# Patient Record
Sex: FEMALE | Race: WHITE | NOT HISPANIC OR LATINO | Employment: OTHER | URBAN - METROPOLITAN AREA
[De-identification: names, ages, dates, MRNs, and addresses within clinical notes are randomized per-mention and may not be internally consistent; named-entity substitution may affect disease eponyms.]

---

## 2017-09-05 ENCOUNTER — TELEPHONE (OUTPATIENT)
Dept: NEUROLOGY | Facility: CLINIC | Age: 82
End: 2017-09-05

## 2017-09-05 NOTE — TELEPHONE ENCOUNTER
----- Message from Nely Urbano sent at 8/31/2017  1:56 PM CDT -----  Contact: self  Patient would like to make an appointment for pains in her head   She is being referred by Dr Nick Aquino    Please call  to schedule     Thanks

## 2017-09-05 NOTE — TELEPHONE ENCOUNTER
Returned call and spoke with patient. Informed patient that we would need a referral in order to schedule. Instructed patient to call her PCP office to fax us the referral. Fax number given. Informed patient that once we received the referral someone from our office would call to schedule her appointment. Patient verbalized understanding.

## 2017-09-21 ENCOUNTER — TELEPHONE (OUTPATIENT)
Dept: NEUROLOGY | Facility: CLINIC | Age: 82
End: 2017-09-21

## 2017-09-21 NOTE — TELEPHONE ENCOUNTER
Called and spoke with patient. Informed patient that referral was received and I was calling to schedule with Dr. Regalado for Headaches. Patient stated she was unaware of needing to be seen for Headaches. Patient Denied appointment. Instructed patient to call back if she changes her mind. Patient verbalized understanding.

## 2017-11-09 ENCOUNTER — HOSPITAL ENCOUNTER (OUTPATIENT)
Dept: RADIOLOGY | Facility: HOSPITAL | Age: 82
Discharge: HOME | End: 2017-11-09
Attending: ORTHOPAEDIC SURGERY
Payer: MEDICARE

## 2017-11-09 ENCOUNTER — HOSPITAL ENCOUNTER (OUTPATIENT)
Dept: PREADMISSION TESTING | Facility: HOSPITAL | Age: 82
Discharge: HOME | End: 2017-11-09
Attending: ORTHOPAEDIC SURGERY
Payer: MEDICARE

## 2017-11-09 VITALS — HEIGHT: 62 IN | BODY MASS INDEX: 22.08 KG/M2 | WEIGHT: 120 LBS

## 2017-11-09 DIAGNOSIS — Z96.652 KNEE JOINT REPLACEMENT STATUS, LEFT: ICD-10-CM

## 2017-11-09 DIAGNOSIS — M17.12 OSTEOARTHRITIS OF LEFT KNEE, UNSPECIFIED OSTEOARTHRITIS TYPE: Primary | ICD-10-CM

## 2017-11-09 LAB
ANION GAP SERPL CALC-SCNC: 10 MMOL/L
BACTERIA #/AREA URNS HPF: NORMAL /HPF
BASOPHILS # BLD AUTO: 0 K/UL
BASOPHILS NFR BLD: 0.4 %
BILIRUB UR QL STRIP: NEGATIVE
BUN SERPL-MCNC: 15 MG/DL
CALCIUM SERPL-MCNC: 10.1 MG/DL
CHLORIDE SERPL-SCNC: 104 MMOL/L
CLARITY UR: CLEAR
CO2 SERPL-SCNC: 28 MMOL/L
COLOR UR: YELLOW
CREAT SERPL-MCNC: 0.8 MG/DL
DIFFERENTIAL METHOD: ABNORMAL
EOSINOPHIL # BLD AUTO: 0.8 K/UL
EOSINOPHIL NFR BLD: 11.1 %
ERYTHROCYTE [DISTWIDTH] IN BLOOD BY AUTOMATED COUNT: 13.1 %
EST. GFR  (AFRICAN AMERICAN): >60 ML/MIN/1.73 M^2
EST. GFR  (NON AFRICAN AMERICAN): >60 ML/MIN/1.73 M^2
GLUCOSE SERPL-MCNC: 96 MG/DL
GLUCOSE UR QL STRIP: NEGATIVE
HCT VFR BLD AUTO: 40.4 %
HGB BLD-MCNC: 13.9 G/DL
HGB UR QL STRIP: ABNORMAL
KETONES UR QL STRIP: NEGATIVE
LEUKOCYTE ESTERASE UR QL STRIP: ABNORMAL
LYMPHOCYTES # BLD AUTO: 2.1 K/UL
LYMPHOCYTES NFR BLD: 27.9 %
MCH RBC QN AUTO: 32.6 PG
MCHC RBC AUTO-ENTMCNC: 34.5 G/DL
MCV RBC AUTO: 94 FL
MICROSCOPIC COMMENT: NORMAL
MONOCYTES # BLD AUTO: 0.5 K/UL
MONOCYTES NFR BLD: 7.3 %
NEUTROPHILS # BLD AUTO: 4 K/UL
NEUTROPHILS NFR BLD: 53.3 %
NITRITE UR QL STRIP: NEGATIVE
PH UR STRIP: 6 [PH] (ref 5–8)
PLATELET # BLD AUTO: 321 K/UL
PMV BLD AUTO: 8 FL
POTASSIUM SERPL-SCNC: 4.4 MMOL/L
PROT UR QL STRIP: NEGATIVE
RBC # BLD AUTO: 4.28 M/UL
RBC #/AREA URNS HPF: 3 /HPF (ref 0–4)
SODIUM SERPL-SCNC: 142 MMOL/L
SP GR UR STRIP: 1.02 (ref 1–1.03)
SQUAMOUS #/AREA URNS HPF: 2 /HPF
URN SPEC COLLECT METH UR: ABNORMAL
UROBILINOGEN UR STRIP-ACNC: NEGATIVE EU/DL
WBC # BLD AUTO: 7.5 K/UL
WBC #/AREA URNS HPF: 4 /HPF (ref 0–5)

## 2017-11-09 PROCEDURE — 85025 COMPLETE CBC W/AUTO DIFF WBC: CPT

## 2017-11-09 PROCEDURE — 80048 BASIC METABOLIC PNL TOTAL CA: CPT

## 2017-11-09 PROCEDURE — 71020 XR CHEST PA AND LATERAL: CPT | Mod: 26,,, | Performed by: RADIOLOGY

## 2017-11-09 PROCEDURE — 99900104 DSU ONLY-NO CHARGE-EA ADD'L HR (STAT)

## 2017-11-09 PROCEDURE — 71020 XR CHEST PA AND LATERAL: CPT | Mod: TC

## 2017-11-09 PROCEDURE — 87081 CULTURE SCREEN ONLY: CPT

## 2017-11-09 PROCEDURE — 36415 COLL VENOUS BLD VENIPUNCTURE: CPT

## 2017-11-09 PROCEDURE — 99900103 DSU ONLY-NO CHARGE-INITIAL HR (STAT)

## 2017-11-09 PROCEDURE — 93010 ELECTROCARDIOGRAM REPORT: CPT | Mod: ,,, | Performed by: INTERNAL MEDICINE

## 2017-11-09 PROCEDURE — 93005 ELECTROCARDIOGRAM TRACING: CPT

## 2017-11-09 PROCEDURE — 81000 URINALYSIS NONAUTO W/SCOPE: CPT

## 2017-11-09 RX ORDER — LOSARTAN POTASSIUM 100 MG/1
100 TABLET ORAL DAILY
COMMUNITY
End: 2021-12-26 | Stop reason: SDUPTHER

## 2017-11-10 NOTE — PRE ADMISSION SCREENING
"               [facility] Risk Assessment Scale    Patient Name: Shalini Donald  YOB: 1934  MRN: [id number]            RIsk Factor Measure Recommendation Patient Data Scale/Score   BMI >40 Reconsider surgery, weight loss   Estimated body mass index is 22.31 kg/m² as calculated from the following:    Height as of this encounter: 5' 1.5" (1.562 m).    Weight as of this encounter: 54.4 kg (120 lb).   [x] 0 = 1 - 24.9  [] 1 = 25-29.9  [] 2 = 30-34.9  [] 3 = 35-39.9  [] 4 = 40-44.9  [] 5 = 45-99.9   Hemoglobin AIC (if applicable) >9 Delay surgery until DM under control  Refer for:  · Nutrition Therapy  · Exercise   · Medication    No results found for: LABA1C, HGBA1C    Lab Results   Component Value Date    GLU 96 11/09/2017      [] 0 = 4.0-5.6  [] 1 = 5.7-6.4  [] 2 = 6.5-6.9  [] 3 = 7.0-7.9  [] 4 = 8.0-8.9  [] 5 = 9.0-12   Hemoglobin (Anemia) <9 Delay surgery   Correct anemia Lab Results   Component Value Date    HGB 13.9 11/09/2017    [] 20 - <9.0                    Albumin <3 Delay surgery &Workup No results found for: ALBUMIN [] 20 - <3.0   Smoking Cessation >4 Weeks Delay Surgery  Refer to OP Cessation Class     [] 20 - current smoker                                _____ PPD                    Hx of MI, PE, Arrhythmia, CVA, DVT <30 Days Delay Surgery     [] 20      Infection Variable Delay surgery and re-evaluate    [] 20 - recent/current infection     Depression (PHQ) >10 out of 27 Delay Surgery and re-evaluate  Medication  Counseling              [x] 0     []1     []2     []3      []4      [] 5                    (1-4)      (5-9)  (10-14)  (15-19)   (20-27)     Memory Impairment & Memory loss (Mini-Cog Screening Tool) Advanced dementia and/or Parkinson's Reconsider surgery     [x] 0     []1     []2     []3     []4     [] 5     Physical Conditioning (Modified AM-PAC Per Physical Therapy at [facility]) Unable to ambulate on day of surgery Delay surgery and re-evaluate  Pre-Rehabilitation   (PT evaluation)      " 22 []  0   [x]4       []8     []12        []16     []20       (<20%)   (<40%)   (<60%)   (<80% )    (>80%)     Home Environment/Caregiver support  (Per /Navigator Interview)    Availability of basic services and/or approprate assistance during post-operative period Delay surgery and re-evaluate  Safe home environment  Health   1 week post-surgery  Transportation  availability  Ability to obtain DME/Medications post-op    [x] 0     []1     []2     []3     []4     [] 5  [x] 0     []1     []2     []3     []4     [] 5  [x] 0     []1     []2     []3     []4     [] 5  [x] 0     []1     []2     []3     []4     [] 5         MD Contact: Dr. Aquino Comments:  Total Score:  4

## 2017-11-10 NOTE — PRE ADMISSION SCREENING
JOINT CAMP ASSESSMENT    Name Shalini Donald   MRN [id number]    Age/Sex 83 y.o. female    Surgeon No ref. provider found   Joint Camp Date 11/10/2017   Surgery Date 11/21/2017   Procedure Left knee Arthroplasty   Insurance Payor: MEDICARE / Plan: MEDICARE PART A & B / Product Type: Government /    Care Team Patient Care Team:  Kali Mccullough MD as PCP - General (Internal Medicine)    Pharmacy   [id number] - [place] - [address]  [address] [address]  Phone: [phone] Fax: [phone]     AM-PAC Score   22   Risk Assessment Score 4     Past Medical History:   Diagnosis Date    Arthritis     Back pain     Hypertension     Wears glasses     Wears partial dentures     upper       Past Surgical History:   Procedure Laterality Date    CHOLECYSTECTOMY      JOINT REPLACEMENT Right     knee         Home Enviroment     Living Arrangement: Lives alone  Home Environment: 1-story house/ trailer, number of outside stairs: 3  Home Safety Concerns: unremarkable    DISCHARGE CAREGIVER/SUPPORT SYSTEM     Identified post-op caregiver: Patient has children / family / friends to help, patient and son.  Patient's caregiver(s) will be able to provide physical assistance. Patient will have someone to assist overnight.      Caregiver present at pre-op interview:  No      PRE-OPERATIVE FUNCTIONAL STATUS     Employment: Retired    Pre-op Functional Status: Patient is independent with mobility/ambulation, transfers, ADL's, IADL's.    Use of assistive device for ambulation: none  ADL: self care  ADL Limitations: difficulty with walking  Medical Restrictions: Decreased range of motions in extremities    POTENTIAL BARRIERS TO DISCHARGE/POTENTIAL POST-OP COMPLICATIONS     Decreased caregiver support. Patients son Moses Donald will be staying with patient for 2 weeks following surgery. Patient expressed concerns over her sons willingness to provide care unless prompted by her.       Depression / Anxiety:  Patient was anxious about care pathway and ability to be able to walk post operatively. She continued to refer back to her last surgery and stated that she did not get up on the day after surgery and that it was hard for her to walk when she got home. Patient was    concerned about being able to get into her home with the 3 stairs out front. She stated that after her last surgery she had an ambulance take her home and someone carried her into the home and put her in her bed.       Needed Interventions (if applicable): Education with son to provide expectations on his role as a  and what will be needed in the home upon discharge. I have attempted to contact the patients son to educate him on 3 occassions since Joint Camp with no answer.        Expected LOS of 2 days or less for joint replacement discussed with patient. We also discussed a discharge path of HH for approximately two weeks with a transition to outpatient PT on the third week given no post-op complications.       Patient in agreement with discharge plan: Yes    DISCHARGE PLAN     Discharge to: Home with 24 hour assistance     HH:  [facility]     OP PT: [facility]     Home DME: bedside commode and shower chair      Needed DME at D/C: rolling walker     Rx: Per Dr. Aquino at discharge     Meds to Beds: N/A     Patient expected to discharge on Aspirin 325 mg twice daily for DVT prophylaxis.

## 2017-11-10 NOTE — PRE ADMISSION SCREENING
Patient Name: Shalini Donald  YOB: 1934   MRN: [id number]     [facility]   Basic Mobility Inpatient Short Form 6 Clicks         How much difficulty does the patient currently have  Unable  A Lot  A Little  None      1. Turning over in bed (including adjusting bedclothes, sheets and blankets)?     1 []    2 []    3 []    4 [x]        2. Sitting down on and standing up from a chair with arms (e.g., wheelchair, bedside commode, etc.)     1 []  2 []  3 [x]     4 []      3. Moving from lying on back to sitting on the side of the bed?     1 []  2 []  3 [x]    4 []    How much help from another person does the patient currently need  Total  A Lot  A Little  None      4. Moving to and from a bed to a chair (including a wheelchair)?    1 []  2 []  3 []    4 [x]      5. Need to walk in hospital room?    1 []  2 []  3 []    4 [x]      6. Climbing 3-5 steps with a railing?    1 []  2 []  3 []    4 [x]       Raw Score:      22            CMS 0-100% Score:   20.91         %   Standardized Score:   53.28            CMS Modifier:      CJ                                    [facility]   Basic Mobility Inpatient Short Form 6 Clicks Score Conversion Table*         *Use this form to convert -PAC Basic Mobility Inpatient Raw Scores.   [facility] Inpatient Basic Mobility Short Form Scoring Example   1. Add the number values associated with the response to each item. For example, items totals yield a Raw Score of 21.   2. Match the raw score to the t-Scale scores (t-Scale score = 50.25, SE = 4.69).   3. Find the associated CMS % (CMS % = 28.97%).   4. Locate the correct CMS Functional Modifier Code, or G Code (G code = CJ)     NOTE: Each -PAC Short Form has a separate conversion table. Make sure that you use the correct conversion table.       Instruction Manual - page 45 contains conversion table

## 2017-11-11 LAB — MRSA SPEC QL CULT: NORMAL

## 2017-11-20 ENCOUNTER — ANESTHESIA EVENT (OUTPATIENT)
Dept: SURGERY | Facility: HOSPITAL | Age: 82
DRG: 470 | End: 2017-11-20
Payer: MEDICARE

## 2017-11-20 NOTE — OR NURSING
Spoke with Darby at Dr. Aquino's office concerning UA result.  States the patient has been treated with bactrin and just need to get a repeat UA on the day of surgery or today when the patient comes in for the T&S.  Spoke with outpatient phlebotomistSilvana about getting the UA done along with the T&S today when the patient arrives.

## 2017-11-21 ENCOUNTER — ANESTHESIA (OUTPATIENT)
Dept: SURGERY | Facility: HOSPITAL | Age: 82
DRG: 470 | End: 2017-11-21
Payer: MEDICARE

## 2017-11-21 ENCOUNTER — HOSPITAL ENCOUNTER (INPATIENT)
Facility: HOSPITAL | Age: 82
LOS: 3 days | Discharge: HOME HEALTH | DRG: 470 | End: 2017-11-24
Attending: ORTHOPAEDIC SURGERY | Admitting: INTERNAL MEDICINE
Payer: MEDICARE

## 2017-11-21 DIAGNOSIS — M19.90 ARTHRITIS: ICD-10-CM

## 2017-11-21 DIAGNOSIS — I10 HYPERTENSION, UNSPECIFIED TYPE: ICD-10-CM

## 2017-11-21 DIAGNOSIS — Z96.652 S/P TOTAL KNEE ARTHROPLASTY, LEFT: ICD-10-CM

## 2017-11-21 DIAGNOSIS — Z96.652 KNEE JOINT REPLACEMENT STATUS, LEFT: Primary | ICD-10-CM

## 2017-11-21 LAB
BILIRUB UR QL STRIP: NEGATIVE
CLARITY UR: CLEAR
COLOR UR: YELLOW
GLUCOSE UR QL STRIP: NEGATIVE
HGB UR QL STRIP: ABNORMAL
KETONES UR QL STRIP: NEGATIVE
LEUKOCYTE ESTERASE UR QL STRIP: NEGATIVE
NITRITE UR QL STRIP: NEGATIVE
PH UR STRIP: 7 [PH] (ref 5–8)
PROT UR QL STRIP: NEGATIVE
SP GR UR STRIP: 1.01 (ref 1–1.03)
URN SPEC COLLECT METH UR: ABNORMAL
UROBILINOGEN UR STRIP-ACNC: NEGATIVE EU/DL

## 2017-11-21 PROCEDURE — 36000711: Performed by: ORTHOPAEDIC SURGERY

## 2017-11-21 PROCEDURE — 27201423 OPTIME MED/SURG SUP & DEVICES STERILE SUPPLY: Performed by: ORTHOPAEDIC SURGERY

## 2017-11-21 PROCEDURE — C2626 INFUSION PUMP, NON-PROG,TEMP: HCPCS | Performed by: ANESTHESIOLOGY

## 2017-11-21 PROCEDURE — 25000003 PHARM REV CODE 250: Performed by: ANESTHESIOLOGY

## 2017-11-21 PROCEDURE — 99900104 DSU ONLY-NO CHARGE-EA ADD'L HR (STAT): Performed by: ORTHOPAEDIC SURGERY

## 2017-11-21 PROCEDURE — 99900103 DSU ONLY-NO CHARGE-INITIAL HR (STAT): Performed by: ORTHOPAEDIC SURGERY

## 2017-11-21 PROCEDURE — C1713 ANCHOR/SCREW BN/BN,TIS/BN: HCPCS | Performed by: ORTHOPAEDIC SURGERY

## 2017-11-21 PROCEDURE — 76942 ECHO GUIDE FOR BIOPSY: CPT | Mod: 26,,, | Performed by: ANESTHESIOLOGY

## 2017-11-21 PROCEDURE — 27000221 HC OXYGEN, UP TO 24 HOURS

## 2017-11-21 PROCEDURE — 63600175 PHARM REV CODE 636 W HCPCS: Performed by: ANESTHESIOLOGY

## 2017-11-21 PROCEDURE — 71000033 HC RECOVERY, INTIAL HOUR: Performed by: ORTHOPAEDIC SURGERY

## 2017-11-21 PROCEDURE — 25000003 PHARM REV CODE 250: Performed by: ORTHOPAEDIC SURGERY

## 2017-11-21 PROCEDURE — 99222 1ST HOSP IP/OBS MODERATE 55: CPT | Mod: AI,,, | Performed by: INTERNAL MEDICINE

## 2017-11-21 PROCEDURE — 25000003 PHARM REV CODE 250: Performed by: NURSE ANESTHETIST, CERTIFIED REGISTERED

## 2017-11-21 PROCEDURE — 36000710: Performed by: ORTHOPAEDIC SURGERY

## 2017-11-21 PROCEDURE — 27201202 HC KIT FOLEY IC PLUS CTR ENTRY (INDWELLING): Performed by: ORTHOPAEDIC SURGERY

## 2017-11-21 PROCEDURE — 37000008 HC ANESTHESIA 1ST 15 MINUTES: Performed by: ORTHOPAEDIC SURGERY

## 2017-11-21 PROCEDURE — 63600175 PHARM REV CODE 636 W HCPCS: Performed by: ORTHOPAEDIC SURGERY

## 2017-11-21 PROCEDURE — 94760 N-INVAS EAR/PLS OXIMETRY 1: CPT

## 2017-11-21 PROCEDURE — 63600175 PHARM REV CODE 636 W HCPCS

## 2017-11-21 PROCEDURE — D9220A PRA ANESTHESIA: Mod: ANES,,, | Performed by: ANESTHESIOLOGY

## 2017-11-21 PROCEDURE — C1776 JOINT DEVICE (IMPLANTABLE): HCPCS | Performed by: ORTHOPAEDIC SURGERY

## 2017-11-21 PROCEDURE — 63600175 PHARM REV CODE 636 W HCPCS: Performed by: NURSE ANESTHETIST, CERTIFIED REGISTERED

## 2017-11-21 PROCEDURE — D9220A PRA ANESTHESIA: Mod: CRNA,,, | Performed by: NURSE ANESTHETIST, CERTIFIED REGISTERED

## 2017-11-21 PROCEDURE — 37000009 HC ANESTHESIA EA ADD 15 MINS: Performed by: ORTHOPAEDIC SURGERY

## 2017-11-21 PROCEDURE — 25000003 PHARM REV CODE 250: Performed by: INTERNAL MEDICINE

## 2017-11-21 PROCEDURE — 81003 URINALYSIS AUTO W/O SCOPE: CPT

## 2017-11-21 PROCEDURE — 71000039 HC RECOVERY, EACH ADD'L HOUR: Performed by: ORTHOPAEDIC SURGERY

## 2017-11-21 PROCEDURE — 11000001 HC ACUTE MED/SURG PRIVATE ROOM

## 2017-11-21 PROCEDURE — S5010 5% DEXTROSE AND 0.45% SALINE: HCPCS | Performed by: INTERNAL MEDICINE

## 2017-11-21 PROCEDURE — C1729 CATH, DRAINAGE: HCPCS | Performed by: ORTHOPAEDIC SURGERY

## 2017-11-21 PROCEDURE — 27200664 HC NERVE BLOCK COMPLETE KIT: Performed by: ANESTHESIOLOGY

## 2017-11-21 PROCEDURE — 94799 UNLISTED PULMONARY SVC/PX: CPT

## 2017-11-21 PROCEDURE — 64448 NJX AA&/STRD FEM NRV NFS IMG: CPT | Mod: 59,LT,, | Performed by: ANESTHESIOLOGY

## 2017-11-21 PROCEDURE — 27200651 HC AIRWAY, LMA: Performed by: NURSE ANESTHETIST, CERTIFIED REGISTERED

## 2017-11-21 PROCEDURE — 0SRD0J9 REPLACEMENT OF LEFT KNEE JOINT WITH SYNTHETIC SUBSTITUTE, CEMENTED, OPEN APPROACH: ICD-10-PCS | Performed by: ORTHOPAEDIC SURGERY

## 2017-11-21 PROCEDURE — 76942 ECHO GUIDE FOR BIOPSY: CPT | Performed by: ANESTHESIOLOGY

## 2017-11-21 DEVICE — BASEPLATE TIB CEM PRIM SZ 3: Type: IMPLANTABLE DEVICE | Site: KNEE | Status: FUNCTIONAL

## 2017-11-21 DEVICE — INSERT TRIATHLON X3 SZ3 9MM: Type: IMPLANTABLE DEVICE | Site: KNEE | Status: FUNCTIONAL

## 2017-11-21 DEVICE — IMPLANTABLE DEVICE: Type: IMPLANTABLE DEVICE | Site: KNEE | Status: FUNCTIONAL

## 2017-11-21 DEVICE — CEMENT BONE ANTIBIO SIMPLEX P: Type: IMPLANTABLE DEVICE | Site: KNEE | Status: FUNCTIONAL

## 2017-11-21 DEVICE — PIN PINABALL HEADLESS: Type: IMPLANTABLE DEVICE | Site: KNEE | Status: FUNCTIONAL

## 2017-11-21 DEVICE — PATELLA TRI 35X10 X3 POLYETHYL: Type: IMPLANTABLE DEVICE | Site: KNEE | Status: FUNCTIONAL

## 2017-11-21 RX ORDER — ASPIRIN 325 MG
325 TABLET ORAL DAILY
Status: ON HOLD | COMMUNITY
End: 2017-11-24 | Stop reason: HOSPADM

## 2017-11-21 RX ORDER — DIPHENHYDRAMINE HYDROCHLORIDE 50 MG/ML
25 INJECTION INTRAMUSCULAR; INTRAVENOUS EVERY 6 HOURS PRN
Status: DISCONTINUED | OUTPATIENT
Start: 2017-11-21 | End: 2017-11-21 | Stop reason: HOSPADM

## 2017-11-21 RX ORDER — FENTANYL CITRATE 50 UG/ML
25 INJECTION, SOLUTION INTRAMUSCULAR; INTRAVENOUS EVERY 5 MIN PRN
Status: COMPLETED | OUTPATIENT
Start: 2017-11-21 | End: 2017-11-21

## 2017-11-21 RX ORDER — VANCOMYCIN HYDROCHLORIDE 500 MG/100ML
500 INJECTION, SOLUTION INTRAVENOUS
Status: DISCONTINUED | OUTPATIENT
Start: 2017-11-21 | End: 2017-11-21

## 2017-11-21 RX ORDER — ONDANSETRON 2 MG/ML
4 INJECTION INTRAMUSCULAR; INTRAVENOUS DAILY PRN
Status: DISCONTINUED | OUTPATIENT
Start: 2017-11-21 | End: 2017-11-21 | Stop reason: HOSPADM

## 2017-11-21 RX ORDER — LIDOCAINE HYDROCHLORIDE 10 MG/ML
1 INJECTION, SOLUTION EPIDURAL; INFILTRATION; INTRACAUDAL; PERINEURAL
Status: DISCONTINUED | OUTPATIENT
Start: 2017-11-21 | End: 2017-11-21 | Stop reason: HOSPADM

## 2017-11-21 RX ORDER — FENTANYL CITRATE 50 UG/ML
INJECTION, SOLUTION INTRAMUSCULAR; INTRAVENOUS
Status: DISCONTINUED | OUTPATIENT
Start: 2017-11-21 | End: 2017-11-21

## 2017-11-21 RX ORDER — SODIUM CHLORIDE 0.9 % (FLUSH) 0.9 %
3 SYRINGE (ML) INJECTION
Status: DISCONTINUED | OUTPATIENT
Start: 2017-11-21 | End: 2017-11-21 | Stop reason: HOSPADM

## 2017-11-21 RX ORDER — SODIUM CHLORIDE 0.9 % (FLUSH) 0.9 %
3 SYRINGE (ML) INJECTION EVERY 8 HOURS
Status: DISCONTINUED | OUTPATIENT
Start: 2017-11-21 | End: 2017-11-21 | Stop reason: HOSPADM

## 2017-11-21 RX ORDER — OXYCODONE HYDROCHLORIDE 5 MG/1
5 TABLET ORAL
Status: DISCONTINUED | OUTPATIENT
Start: 2017-11-21 | End: 2017-11-21 | Stop reason: HOSPADM

## 2017-11-21 RX ORDER — ONDANSETRON HYDROCHLORIDE 2 MG/ML
INJECTION, SOLUTION INTRAMUSCULAR; INTRAVENOUS
Status: DISCONTINUED | OUTPATIENT
Start: 2017-11-21 | End: 2017-11-21

## 2017-11-21 RX ORDER — BISACODYL 10 MG
10 SUPPOSITORY, RECTAL RECTAL DAILY PRN
Status: DISCONTINUED | OUTPATIENT
Start: 2017-11-21 | End: 2017-11-24 | Stop reason: HOSPADM

## 2017-11-21 RX ORDER — MEPERIDINE HYDROCHLORIDE 25 MG/ML
12.5 INJECTION INTRAMUSCULAR; INTRAVENOUS; SUBCUTANEOUS ONCE AS NEEDED
Status: DISCONTINUED | OUTPATIENT
Start: 2017-11-21 | End: 2017-11-21 | Stop reason: HOSPADM

## 2017-11-21 RX ORDER — PROMETHAZINE HYDROCHLORIDE 12.5 MG/1
12.5 TABLET ORAL EVERY 6 HOURS PRN
Status: DISCONTINUED | OUTPATIENT
Start: 2017-11-21 | End: 2017-11-21 | Stop reason: HOSPADM

## 2017-11-21 RX ORDER — MUPIROCIN 20 MG/G
1 OINTMENT TOPICAL 2 TIMES DAILY
Status: DISCONTINUED | OUTPATIENT
Start: 2017-11-21 | End: 2017-11-24 | Stop reason: HOSPADM

## 2017-11-21 RX ORDER — DEXTROSE MONOHYDRATE AND SODIUM CHLORIDE 5; .45 G/100ML; G/100ML
INJECTION, SOLUTION INTRAVENOUS CONTINUOUS
Status: DISCONTINUED | OUTPATIENT
Start: 2017-11-21 | End: 2017-11-24 | Stop reason: HOSPADM

## 2017-11-21 RX ORDER — PROPOFOL 10 MG/ML
VIAL (ML) INTRAVENOUS
Status: DISCONTINUED | OUTPATIENT
Start: 2017-11-21 | End: 2017-11-21

## 2017-11-21 RX ORDER — HYDROMORPHONE HYDROCHLORIDE 2 MG/ML
0.2 INJECTION, SOLUTION INTRAMUSCULAR; INTRAVENOUS; SUBCUTANEOUS EVERY 5 MIN PRN
Status: DISCONTINUED | OUTPATIENT
Start: 2017-11-21 | End: 2017-11-21 | Stop reason: HOSPADM

## 2017-11-21 RX ORDER — FAMOTIDINE 20 MG/1
20 TABLET, FILM COATED ORAL 2 TIMES DAILY
Status: DISCONTINUED | OUTPATIENT
Start: 2017-11-21 | End: 2017-11-22

## 2017-11-21 RX ORDER — ACETAMINOPHEN 10 MG/ML
INJECTION, SOLUTION INTRAVENOUS
Status: DISCONTINUED | OUTPATIENT
Start: 2017-11-21 | End: 2017-11-21

## 2017-11-21 RX ORDER — ONDANSETRON 2 MG/ML
4 INJECTION INTRAMUSCULAR; INTRAVENOUS EVERY 12 HOURS PRN
Status: DISCONTINUED | OUTPATIENT
Start: 2017-11-21 | End: 2017-11-24 | Stop reason: HOSPADM

## 2017-11-21 RX ORDER — LIDOCAINE HCL/PF 100 MG/5ML
SYRINGE (ML) INTRAVENOUS
Status: DISCONTINUED | OUTPATIENT
Start: 2017-11-21 | End: 2017-11-21

## 2017-11-21 RX ORDER — HYDROCODONE BITARTRATE AND ACETAMINOPHEN 5; 325 MG/1; MG/1
1 TABLET ORAL EVERY 4 HOURS PRN
Status: DISCONTINUED | OUTPATIENT
Start: 2017-11-21 | End: 2017-11-24 | Stop reason: HOSPADM

## 2017-11-21 RX ORDER — ONDANSETRON 4 MG/1
4 TABLET, ORALLY DISINTEGRATING ORAL ONCE
Status: DISCONTINUED | OUTPATIENT
Start: 2017-11-21 | End: 2017-11-24 | Stop reason: HOSPADM

## 2017-11-21 RX ORDER — SCOLOPAMINE TRANSDERMAL SYSTEM 1 MG/1
1 PATCH, EXTENDED RELEASE TRANSDERMAL ONCE
Status: COMPLETED | OUTPATIENT
Start: 2017-11-21 | End: 2017-11-21

## 2017-11-21 RX ORDER — MIDAZOLAM HYDROCHLORIDE 1 MG/ML
INJECTION, SOLUTION INTRAMUSCULAR; INTRAVENOUS
Status: DISCONTINUED | OUTPATIENT
Start: 2017-11-21 | End: 2017-11-21

## 2017-11-21 RX ORDER — TRANEXAMIC ACID 100 MG/ML
INJECTION, SOLUTION INTRAVENOUS
Status: DISCONTINUED | OUTPATIENT
Start: 2017-11-21 | End: 2017-11-21

## 2017-11-21 RX ORDER — ZOLPIDEM TARTRATE 5 MG/1
5 TABLET ORAL NIGHTLY PRN
Status: DISCONTINUED | OUTPATIENT
Start: 2017-11-21 | End: 2017-11-24 | Stop reason: HOSPADM

## 2017-11-21 RX ORDER — ASPIRIN 325 MG
325 TABLET ORAL 2 TIMES DAILY
Status: DISCONTINUED | OUTPATIENT
Start: 2017-11-21 | End: 2017-11-24 | Stop reason: HOSPADM

## 2017-11-21 RX ORDER — LOSARTAN POTASSIUM 25 MG/1
100 TABLET ORAL DAILY
Status: DISCONTINUED | OUTPATIENT
Start: 2017-11-22 | End: 2017-11-24 | Stop reason: HOSPADM

## 2017-11-21 RX ORDER — OXYCODONE HYDROCHLORIDE 5 MG/1
10 TABLET ORAL EVERY 4 HOURS PRN
Status: DISCONTINUED | OUTPATIENT
Start: 2017-11-21 | End: 2017-11-22

## 2017-11-21 RX ORDER — ACETAMINOPHEN 325 MG/1
650 TABLET ORAL EVERY 4 HOURS PRN
Status: DISCONTINUED | OUTPATIENT
Start: 2017-11-21 | End: 2017-11-24 | Stop reason: HOSPADM

## 2017-11-21 RX ORDER — KETAMINE HYDROCHLORIDE 10 MG/ML
INJECTION, SOLUTION INTRAMUSCULAR; INTRAVENOUS
Status: DISCONTINUED | OUTPATIENT
Start: 2017-11-21 | End: 2017-11-21

## 2017-11-21 RX ADMIN — DEXTROSE AND SODIUM CHLORIDE: 5; .45 INJECTION, SOLUTION INTRAVENOUS at 07:11

## 2017-11-21 RX ADMIN — TRANEXAMIC ACID 500 MG: 100 INJECTION, SOLUTION INTRAVENOUS at 03:11

## 2017-11-21 RX ADMIN — SODIUM CHLORIDE, SODIUM GLUCONATE, SODIUM ACETATE, POTASSIUM CHLORIDE, MAGNESIUM CHLORIDE, SODIUM PHOSPHATE, DIBASIC, AND POTASSIUM PHOSPHATE: .53; .5; .37; .037; .03; .012; .00082 INJECTION, SOLUTION INTRAVENOUS at 09:11

## 2017-11-21 RX ADMIN — EPHEDRINE SULFATE 10 MG: 50 INJECTION, SOLUTION INTRAMUSCULAR; INTRAVENOUS; SUBCUTANEOUS at 03:11

## 2017-11-21 RX ADMIN — SODIUM CHLORIDE, SODIUM GLUCONATE, SODIUM ACETATE, POTASSIUM CHLORIDE, MAGNESIUM CHLORIDE, SODIUM PHOSPHATE, DIBASIC, AND POTASSIUM PHOSPHATE: .53; .5; .37; .037; .03; .012; .00082 INJECTION, SOLUTION INTRAVENOUS at 02:11

## 2017-11-21 RX ADMIN — ROPIVACAINE HYDROCHLORIDE: 2 INJECTION, SOLUTION EPIDURAL; INFILTRATION at 05:11

## 2017-11-21 RX ADMIN — FENTANYL CITRATE 50 MCG: 50 INJECTION, SOLUTION INTRAMUSCULAR; INTRAVENOUS at 03:11

## 2017-11-21 RX ADMIN — FENTANYL CITRATE 50 MCG: 50 INJECTION, SOLUTION INTRAMUSCULAR; INTRAVENOUS at 04:11

## 2017-11-21 RX ADMIN — PROPOFOL 100 MG: 10 INJECTION, EMULSION INTRAVENOUS at 03:11

## 2017-11-21 RX ADMIN — FENTANYL CITRATE 25 MCG: 50 INJECTION, SOLUTION INTRAMUSCULAR; INTRAVENOUS at 05:11

## 2017-11-21 RX ADMIN — PROPOFOL 10 MG: 10 INJECTION, EMULSION INTRAVENOUS at 03:11

## 2017-11-21 RX ADMIN — ACETAMINOPHEN 1000 MG: 10 INJECTION, SOLUTION INTRAVENOUS at 03:11

## 2017-11-21 RX ADMIN — PROPOFOL 10 MG: 10 INJECTION, EMULSION INTRAVENOUS at 02:11

## 2017-11-21 RX ADMIN — MUPIROCIN 1 G: 20 OINTMENT TOPICAL at 08:11

## 2017-11-21 RX ADMIN — FAMOTIDINE 20 MG: 20 TABLET, FILM COATED ORAL at 08:11

## 2017-11-21 RX ADMIN — VANCOMYCIN HYDROCHLORIDE 500 MG: 500 INJECTION, POWDER, LYOPHILIZED, FOR SOLUTION INTRAVENOUS at 02:11

## 2017-11-21 RX ADMIN — HYDROCODONE BITARTRATE AND ACETAMINOPHEN 1 TABLET: 5; 325 TABLET ORAL at 09:11

## 2017-11-21 RX ADMIN — OXYCODONE HYDROCHLORIDE 5 MG: 5 TABLET ORAL at 05:11

## 2017-11-21 RX ADMIN — LIDOCAINE HYDROCHLORIDE 100 MG: 20 INJECTION, SOLUTION INTRAVENOUS at 03:11

## 2017-11-21 RX ADMIN — SCOPALAMINE 1 PATCH: 1 PATCH, EXTENDED RELEASE TRANSDERMAL at 09:11

## 2017-11-21 RX ADMIN — FENTANYL CITRATE 25 MCG: 50 INJECTION, SOLUTION INTRAMUSCULAR; INTRAVENOUS at 03:11

## 2017-11-21 RX ADMIN — KETAMINE HYDROCHLORIDE 10 MG: 10 INJECTION, SOLUTION INTRAMUSCULAR; INTRAVENOUS at 03:11

## 2017-11-21 RX ADMIN — ASPIRIN 325 MG ORAL TABLET 325 MG: 325 PILL ORAL at 08:11

## 2017-11-21 RX ADMIN — SODIUM CHLORIDE, SODIUM GLUCONATE, SODIUM ACETATE, POTASSIUM CHLORIDE, MAGNESIUM CHLORIDE, SODIUM PHOSPHATE, DIBASIC, AND POTASSIUM PHOSPHATE: .53; .5; .37; .037; .03; .012; .00082 INJECTION, SOLUTION INTRAVENOUS at 04:11

## 2017-11-21 RX ADMIN — FENTANYL CITRATE 25 MCG: 50 INJECTION, SOLUTION INTRAMUSCULAR; INTRAVENOUS at 04:11

## 2017-11-21 RX ADMIN — ONDANSETRON 4 MG: 2 INJECTION, SOLUTION INTRAMUSCULAR; INTRAVENOUS at 04:11

## 2017-11-21 RX ADMIN — MIDAZOLAM 2 MG: 1 INJECTION INTRAMUSCULAR; INTRAVENOUS at 11:11

## 2017-11-21 RX ADMIN — LIDOCAINE HYDROCHLORIDE 10 MG: 10 INJECTION, SOLUTION EPIDURAL; INFILTRATION; INTRACAUDAL; PERINEURAL at 09:11

## 2017-11-21 NOTE — BRIEF OP NOTE
[facility]  Brief Operative Note    SUMMARY     Surgery Date: 11/21/2017     Surgeon(s) and Role:     * Nick Aquino Jr., MD - Primary    Assisting Surgeon: None    Pre-op Diagnosis:  Osteoarthritis of left knee [M17.12]    Post-op Diagnosis:  Post-Op Diagnosis Codes:     * Osteoarthritis of left knee [M17.12]    Procedure(s) (LRB):  ARTHROPLASTY-KNEE (Left)    Anesthesia: General    Description of the findings of the procedure:moder med o.a    Estimated Blood Loss: *20 No values recorded between 11/21/2017  3:27 PM and 11/21/2017  4:46 PM *         Specimens:   Specimen (12h ago through future)    None

## 2017-11-21 NOTE — H&P
PCP: Kali Mccullough MD    History & Physical    Chief Complaint: s/p left total knee arthroplasty    History of Present Illness:  Patient is a 83 y.o. female admitted to Hospitalist Service from Operation Room s/p left total knee arthroplasty performed by Dr. Aquino. Patient reportedly has past medical history significant for OA, chronic lower back pain and hypertension. Patient is evaluate din recovery room. Patient is still sedated due to anesthetic agent. Not able to provide further details. No acute distress noted.     Past Medical History:   Diagnosis Date    Arthritis     Back pain     Hypertension     Wears glasses     Wears partial dentures     upper     Past Surgical History:   Procedure Laterality Date    CHOLECYSTECTOMY      EYE SURGERY Bilateral     cataracts    JOINT REPLACEMENT Right     knee     History reviewed. No pertinent family history.  Social History   Substance Use Topics    Smoking status: Never Smoker    Smokeless tobacco: Never Used    Alcohol use No      Review of patient's allergies indicates:   Allergen Reactions    Pcn [penicillins] Rash    Tylenol [acetaminophen] Other (See Comments)     headaches     PTA Medications   Medication Sig    aspirin 325 MG tablet Take 325 mg by mouth once daily.    losartan (COZAAR) 100 MG tablet Take 100 mg by mouth once daily.     Review of Systems: Due to sedation unable to obtain ROS.     OBJECTIVE:     Vital Signs (Most Recent)  Temp: 97.9 °F (36.6 °C) (11/21/17 1700)  Pulse: 72 (11/21/17 1705)  Resp: 17 (11/21/17 1705)  BP: (!) 175/80 (11/21/17 1705)  SpO2: 98 % (11/21/17 1705)    Physical Exam:  General appearance: well developed, appears stated age  Head: normocephalic, atraumatic  Eyes:  conjunctivae/corneas clear. PERRL.  Nose: Nares normal. Septum midline.  Throat: lips, mucosa, and tongue normal; teeth and gums normal, no throat erythema.  Neck: supple, symmetrical, trachea midline, no JVD and thyroid not enlarged, symmetric, no  tenderness/mass/nodules  Lungs:  clear to auscultation bilaterally and normal respiratory effort  Chest wall: no tenderness  Heart: regular rate and rhythm, S1, S2 normal, no murmur, click, rub or gallop  Abdomen: soft, non-tender non-distented; bowel sounds normal; no masses,  no organomegaly  Extremities: no cyanosis, clubbing or edema. Left knee dressing C/D/I with a LAUREN drain in place.  Pulses: 2+ and symmetric  Skin: Skin color, texture, turgor normal. No rashes or lesions.  Lymph nodes: Cervical, supraclavicular, and axillary nodes normal.  Neurologic: Sedated due to anesthetics.    Laboratory:   CBC: No results for input(s): WBC, RBC, HGB, HCT, PLT, MCV, MCH, MCHC in the last 168 hours.  CMP: No results for input(s): GLU, CALCIUM, ALBUMIN, PROT, NA, K, CO2, CL, BUN, CREATININE, ALKPHOS, ALT, AST, BILITOT in the last 168 hours.    Diagnostic Results:   Left knee x-ray: Intraoperative left knee radiograph demonstrates good positioning of arthroplasty components.    Assessment/Plan:     Active Hospital Problems    Diagnosis  POA    *S/P total knee arthroplasty, left [Z96.652]  Not Applicable    Arthritis [M19.90]  Continue to follow Orthopedic recommendations.  Needs aggressive incentive spirometry.  Follow hemoglobin and hematocrit closely.  Pain control with IV narcotics and antiemetics as needed.  Physical therapy as per Orthopedics protocol with fall precautions.    Yes    Hypertension [I10]  Chronic problem. Will continue chronic medications and monitor for any changes, adjusting as needed.    Yes       DVT prophylaxis: On  mg po bid as per Dr. Aquino.    Joshua Mccabe MD  Department of Hospital Medicine   [facility]

## 2017-11-21 NOTE — PT/OT/SLP EVAL
Physical Therapy  Evaluation    Shalini Donald   MRN: [id number]   Admitting Diagnosis: S/P total knee arthroplasty, left    Pt not seen this date for evaluation as at 5:13 pm pt was still sedated per MD note.  Not appropriate for PT at this time.  Will assess pt in the morning.      Talisha Crabtree, PT

## 2017-11-21 NOTE — OR NURSING
Pt arrived to preop and placed in bed. Warm blankets applied. Periop process explained to both patient and son with verbalized understanding. Pts belongings tagged/bagged and kept in PACU. Son kept pts purse.

## 2017-11-21 NOTE — ANESTHESIA PROCEDURE NOTES
Peripheral    Patient location during procedure: pre-op   Block not for primary anesthetic.  Reason for block: at surgeon's request and post-op pain management   Post-op Pain Location: Left knee pain  Start time: 11/21/2017 11:44 AM  Timeout: 11/21/2017 11:44 AM   End time: 11/21/2017 12:00 PM  Surgery related to: left total knee replacement  Staffing  Anesthesiologist: XIOMY CLINE  Performed: anesthesiologist   Preanesthetic Checklist  Completed: patient identified, site marked, surgical consent, pre-op evaluation, timeout performed, IV checked, risks and benefits discussed and monitors and equipment checked  Peripheral Block  Patient position: supine  Prep: ChloraPrep and DuraPrep  Patient monitoring: heart rate, cardiac monitor and continuous pulse ox  Block type: adductor canal  Laterality: left  Injection technique: continuous  Needle  Needle type: Tuohy   Needle gauge: 18 G  Needle length: 4 in  Needle localization: ultrasound guidance  Needle insertion depth: 4 cm  Catheter type: non-stimulating  Catheter size: 20 G  Catheter at skin depth: 4 cm  Test dose: lidocaine 1.5% with Epi 1-to-200,000 and negative   -ultrasound image captured on disc.  Assessment  Injection assessment: negative aspiration, negative parasthesia and local visualized surrounding nerve  Paresthesia pain: none  Heart rate change: no  Slow fractionated injection: yes  Medications:  Bolus administered: 30 mL of 0.375 ropivacaine  Epinephrine added: none  Additional Notes  Blocked uneventfully and pt tolerated well.

## 2017-11-21 NOTE — TRANSFER OF CARE
"Anesthesia Transfer of Care Note    Patient: Shalini Donald    Procedure(s) Performed: Procedure(s) (LRB):  ARTHROPLASTY-KNEE (Left)    Patient location: PACU    Anesthesia Type: general    Transport from OR: Transported from OR on 2-3 L/min O2 by NC with adequate spontaneous ventilation    Post pain: adequate analgesia    Post assessment: no apparent anesthetic complications    Post vital signs: stable    Level of consciousness: sedated and responds to stimulation    Nausea/Vomiting: no nausea/vomiting    Complications: none    Transfer of care protocol was followed      Last vitals:   Visit Vitals  BP (!) 155/72 (BP Location: Left arm, Patient Position: Lying)   Pulse 92   Temp 36.7 °C (98.1 °F) (Temporal)   Resp 18   Ht 5' 1.5" (1.562 m)   Wt 54.4 kg (120 lb)   SpO2 97%   Breastfeeding? No   BMI 22.31 kg/m²     "

## 2017-11-21 NOTE — ANESTHESIA PREPROCEDURE EVALUATION
11/21/2017  Shalini Donald is a 83 y.o., female.    Anesthesia Evaluation    I have reviewed the Patient Summary Reports.    I have reviewed the Nursing Notes.   I have reviewed the Medications.     Review of Systems  Anesthesia Hx:  No problems with previous Anesthesia    Social:  Non-Smoker    Cardiovascular:   Hypertension, well controlled    Pulmonary:  Pulmonary Normal    Renal/:  Renal/ Normal     Neurological:  Neurology Normal    Endocrine:  Endocrine Normal        Physical Exam  General:  Well nourished    Airway/Jaw/Neck:  Airway Findings: Mouth Opening: Normal Tongue: Normal  General Airway Assessment: Adult  Oropharynx Findings:  Mallampati: II  Jaw/Neck Findings:  Neck ROM: Normal ROM     Eyes/Ears/Nose:  Eyes/Ears/Nose Findings:    Dental:  Dental Findings:   Chest/Lungs:  Chest/Lungs Findings: Normal Respiratory Rate     Heart/Vascular:  Heart Findings: Rate: Normal  Rhythm: Regular Rhythm        Mental Status:  Mental Status Findings:  Cooperative, Alert and Oriented         Anesthesia Plan  Type of Anesthesia, risks & benefits discussed:  Anesthesia Type:  general  Patient's Preference:   Intra-op Monitoring Plan:   Intra-op Monitoring Plan Comments:   Post Op Pain Control Plan: multimodal analgesia  Post Op Pain Control Plan Comments:   Induction:   IV  Beta Blocker:  Patient is not currently on a Beta-Blocker (No further documentation required).       Informed Consent: Patient understands risks and agrees with Anesthesia plan.  Questions answered. Anesthesia consent signed with patient.  ASA Score: 2     Day of Surgery Review of History & Physical:  There are no significant changes.   H&P completed by Anesthesiologist.   Anesthesia Plan Notes: After discussing options at length with Pt and son, Pt has agreed to an adductor canal block and GETA.  (Son talked her into a block but  couldn't persuade her on the spinal)  Pt has had back trouble and is very concerned about any neuraxial anesthesia and refuses spinal.          Ready For Surgery From Anesthesia Perspective.

## 2017-11-22 LAB
ANION GAP SERPL CALC-SCNC: 7 MMOL/L
BASOPHILS # BLD AUTO: 0 K/UL
BASOPHILS NFR BLD: 0.4 %
BUN SERPL-MCNC: 8 MG/DL
CALCIUM SERPL-MCNC: 8.3 MG/DL
CHLORIDE SERPL-SCNC: 105 MMOL/L
CO2 SERPL-SCNC: 26 MMOL/L
CREAT SERPL-MCNC: 0.7 MG/DL
DIFFERENTIAL METHOD: ABNORMAL
EOSINOPHIL # BLD AUTO: 0.1 K/UL
EOSINOPHIL NFR BLD: 2.3 %
ERYTHROCYTE [DISTWIDTH] IN BLOOD BY AUTOMATED COUNT: 12.9 %
EST. GFR  (AFRICAN AMERICAN): >60 ML/MIN/1.73 M^2
EST. GFR  (NON AFRICAN AMERICAN): >60 ML/MIN/1.73 M^2
GLUCOSE SERPL-MCNC: 124 MG/DL
HCT VFR BLD AUTO: 33.1 %
HGB BLD-MCNC: 11.2 G/DL
LYMPHOCYTES # BLD AUTO: 1.4 K/UL
LYMPHOCYTES NFR BLD: 24.7 %
MCH RBC QN AUTO: 32.4 PG
MCHC RBC AUTO-ENTMCNC: 33.8 G/DL
MCV RBC AUTO: 96 FL
MONOCYTES # BLD AUTO: 0.7 K/UL
MONOCYTES NFR BLD: 12.1 %
NEUTROPHILS # BLD AUTO: 3.4 K/UL
NEUTROPHILS NFR BLD: 60.5 %
PLATELET # BLD AUTO: 222 K/UL
PMV BLD AUTO: 7.4 FL
POTASSIUM SERPL-SCNC: 3.8 MMOL/L
RBC # BLD AUTO: 3.45 M/UL
SODIUM SERPL-SCNC: 138 MMOL/L
WBC # BLD AUTO: 5.7 K/UL

## 2017-11-22 PROCEDURE — G8988 SELF CARE GOAL STATUS: HCPCS | Mod: CJ

## 2017-11-22 PROCEDURE — 99900035 HC TECH TIME PER 15 MIN (STAT)

## 2017-11-22 PROCEDURE — 85025 COMPLETE CBC W/AUTO DIFF WBC: CPT

## 2017-11-22 PROCEDURE — 97162 PT EVAL MOD COMPLEX 30 MIN: CPT

## 2017-11-22 PROCEDURE — 80048 BASIC METABOLIC PNL TOTAL CA: CPT

## 2017-11-22 PROCEDURE — 25000003 PHARM REV CODE 250: Performed by: ORTHOPAEDIC SURGERY

## 2017-11-22 PROCEDURE — 94760 N-INVAS EAR/PLS OXIMETRY 1: CPT

## 2017-11-22 PROCEDURE — G8987 SELF CARE CURRENT STATUS: HCPCS | Mod: CK

## 2017-11-22 PROCEDURE — 11000001 HC ACUTE MED/SURG PRIVATE ROOM

## 2017-11-22 PROCEDURE — 99232 SBSQ HOSP IP/OBS MODERATE 35: CPT | Mod: ,,, | Performed by: INTERNAL MEDICINE

## 2017-11-22 PROCEDURE — 36415 COLL VENOUS BLD VENIPUNCTURE: CPT

## 2017-11-22 PROCEDURE — 25000003 PHARM REV CODE 250: Performed by: INTERNAL MEDICINE

## 2017-11-22 PROCEDURE — 63600175 PHARM REV CODE 636 W HCPCS: Performed by: ORTHOPAEDIC SURGERY

## 2017-11-22 PROCEDURE — 97530 THERAPEUTIC ACTIVITIES: CPT

## 2017-11-22 PROCEDURE — 97165 OT EVAL LOW COMPLEX 30 MIN: CPT

## 2017-11-22 PROCEDURE — 97535 SELF CARE MNGMENT TRAINING: CPT

## 2017-11-22 PROCEDURE — 63600175 PHARM REV CODE 636 W HCPCS: Performed by: INTERNAL MEDICINE

## 2017-11-22 RX ORDER — FAMOTIDINE 20 MG/1
20 TABLET, FILM COATED ORAL DAILY
Status: DISCONTINUED | OUTPATIENT
Start: 2017-11-23 | End: 2017-11-24 | Stop reason: HOSPADM

## 2017-11-22 RX ADMIN — ACETAMINOPHEN 650 MG: 325 TABLET, FILM COATED ORAL at 05:11

## 2017-11-22 RX ADMIN — ASPIRIN 325 MG ORAL TABLET 325 MG: 325 PILL ORAL at 09:11

## 2017-11-22 RX ADMIN — LOSARTAN POTASSIUM 100 MG: 25 TABLET, FILM COATED ORAL at 09:11

## 2017-11-22 RX ADMIN — MUPIROCIN 1 G: 20 OINTMENT TOPICAL at 09:11

## 2017-11-22 RX ADMIN — VANCOMYCIN HYDROCHLORIDE 1000 MG: 1 INJECTION, POWDER, LYOPHILIZED, FOR SOLUTION INTRAVENOUS at 05:11

## 2017-11-22 RX ADMIN — FAMOTIDINE 20 MG: 20 TABLET, FILM COATED ORAL at 09:11

## 2017-11-22 RX ADMIN — HYDROCODONE BITARTRATE AND ACETAMINOPHEN 1 TABLET: 5; 325 TABLET ORAL at 02:11

## 2017-11-22 RX ADMIN — HYDROCODONE BITARTRATE AND ACETAMINOPHEN 1 TABLET: 5; 325 TABLET ORAL at 09:11

## 2017-11-22 NOTE — PT/OT/SLP EVAL
"Physical Therapy  Evaluation    Shalini Donald   MRN: [id number]   Admitting Diagnosis: S/P total knee arthroplasty, left    PT Received On: 11/22/17  PT Start Time: 0821     PT Stop Time: 0848    PT Total Time (min): 27 min       Billable Minutes:  Evaluation 19 and Therapeutic Activity 8    Diagnosis: S/P total knee arthroplasty, left    Past Medical History:   Diagnosis Date    Arthritis     Back pain     Hypertension     Wears glasses     Wears partial dentures     upper      Past Surgical History:   Procedure Laterality Date    CHOLECYSTECTOMY      EYE SURGERY Bilateral     cataracts    JOINT REPLACEMENT Right     knee       Referring physician: Dr. Aquino  Date referred to PT: 11/21/2017    General Precautions: Standard, fall  Orthopedic Precautions: LLE weight bearing as tolerated   Braces: N/A       Do you have any cultural, spiritual, Yazdanism conflicts, given your current situation?: None    Patient History:  Lives With: alone  Living Arrangements: house  Home Accessibility: stairs to enter home  Home Layout: Able to live on 1st floor  Number of Stairs to Enter Home: 3  Stair Railings at Home: outside, present at both sides  Transportation Available: family or friend will provide  Living Environment Comment: Pt lives alone but her son will be staying with her at discharge. PTA she was independent with mobility.   Equipment Currently Used at Home: none    Previous Level of Function:  Ambulation Skills: independent  Transfer Skills: independent  ADL Skills: independent  Work/Leisure Activity: independent    Subjective:  Communicated with RN prior to session.  Pt asked "When does therapy start?"  Chief Complaint: LLE pain  Patient goals: To go home    Pain/Comfort  Pain Rating 1: 2/10  Location - Side 1: Left  Location - Orientation 1: generalized  Location 1: knee  Pain Addressed 1: Reposition, Distraction  Pain Rating Post-Intervention 1: 2/10      Objective:   Patient found with: peripheral IV, Polar " ice, perineural catheter     Cognitive Exam:  Oriented to: Person, Place, Time and Situation    Follows Commands/attention: Follows two-step commands, easily distracted. Pt attempting to remove On-Q ball strap and required redirection.   Communication: clear/fluent  Safety awareness/insight to disability: impaired    Physical Exam:  Postural examination/scapula alignment: Rounded shoulder and Head forward    Skin integrity: Visible skin intact  Edema: None noted     Sensation:   Intact    Upper Extremity Range of Motion:  Right Upper Extremity: WFL  Left Upper Extremity: WFL    Upper Extremity Strength:  Right Upper Extremity: WFL  Left Upper Extremity: WFL    Lower Extremity Range of Motion:  Right Lower Extremity: WFL  Left Lower Extremity: WFL except knee ROM limited to -10 to 60 degrees    Lower Extremity Strength:  Right Lower Extremity: WFL  Left Lower Extremity: 3-/5 throughout    Gross motor coordination: WFL    Functional Mobility:  Bed Mobility:  Supine to Sit: Minimum Assistance    Transfers:  Sit <> Stand Assistance: Minimum Assistance  Sit <> Stand Assistive Device: Rolling Walker (cues for hand placement)  Toilet Transfer Assistance: Minimum Assistance  Toilet Transfer Assistive Device: Rolling Walker   Consistent cues to closely approach commode/chair before sitting and to reach back for stability.     Gait:   Gait Distance: 10 feet within room  Assistance 1: Minimum assistance  Gait Assistive Device: Rolling walker  Gait Pattern: 3-point gait  Gait Deviation(s): decreased merrill, increased time in double stance, decreased velocity of limb motion, decreased step length, forward lean, decreased toe-to-floor clearance, decreased weight-shifting ability  Cues for leading with LLE and maintaining walker near NATHANIEL    Balance:   Static Sit: FAIR+: Able to take MINIMAL challenges from all directions  Dynamic Sit: FAIR+: Maintains balance through MINIMAL excursions of active trunk motion  Static Stand: POOR+:  Needs MINIMAL assist to maintain  Dynamic stand: POOR: N/A    AM-PAC 6 CLICK MOBILITY  How much help from another person does this patient currently need?   1 = Unable, Total/Dependent Assistance  2 = A lot, Maximum/Moderate Assistance  3 = A little, Minimum/Contact Guard/Supervision  4 = None, Modified [place]/Independent    Turning over in bed (including adjusting bedclothes, sheets and blankets)?: 3  Sitting down on and standing up from a chair with arms (e.g., wheelchair, bedside commode, etc.): 3  Moving from lying on back to sitting on the side of the bed?: 3  Moving to and from a bed to a chair (including a wheelchair)?: 3  Need to walk in hospital room?: 3  Climbing 3-5 steps with a railing?: 1  Total Score: 16     AM-PAC Raw Score CMS G-Code Modifier Level of Impairment Assistance   6 % Total / Unable   7 - 9 CM 80 - 100% Maximal Assist   10 - 14 CL 60 - 80% Moderate Assist   15 - 19 CK 40 - 60% Moderate Assist   20 - 22 CJ 20 - 40% Minimal Assist   23 CI 1-20% SBA / CGA   24 CH 0% Independent/ Mod I     Patient left up in chair with all lines intact, call button in reach and RN notified. LEs elevated and door open to reduce fall risk.     Assessment:   Shalini Donald is a 83 y.o. female with a medical diagnosis of S/P total knee arthroplasty, left and presents with anxiety with mobility and occasional confusion, which limits safety with mobility. She has post-op pain and weakness which also limits independence. She would benefit from continued PT to progress mobility.    Rehab identified problem list/impairments: Rehab identified problem list/impairments: weakness, impaired endurance, impaired self care skills, impaired functional mobilty, gait instability, impaired balance, decreased lower extremity function, decreased safety awareness, pain, decreased ROM, orthopedic precautions, impaired coordination, impaired joint extensibility    Rehab potential is good.    Activity tolerance:  Fair    Discharge recommendations: Discharge Facility/Level Of Care Needs: home health PT     Barriers to discharge: Barriers to Discharge: Inaccessible home environment    Equipment recommendations: Equipment Needed After Discharge: walker, rolling     GOALS:    Physical Therapy Goals        Problem: Physical Therapy Goal    Goal Priority Disciplines Outcome Goal Variances Interventions   Physical Therapy Goal     PT/OT, PT Ongoing (interventions implemented as appropriate)     Description:  Goals to be met by: 2017     Patient will increase functional independence with mobility by performin. Supine to sit with Stand-by Assistance  2. Sit to supine with Stand-by Assistance  3. Sit to stand transfer with Stand-by Assistance  4. Bed to chair transfer with Stand-by Assistance using Rolling Walker  5. Gait  x 150 feet with Stand-by Assistance using Rolling Walker.   6. Ascend/descend 3 stairs with bilateral Handrails Contact Guard Assistance using Rolling Walker.   7. Lower extremity exercise program x10 reps per handout, with supervision                      PLAN:    Patient to be seen BID to address the above listed problems via therapeutic exercises, therapeutic activities  Plan of Care expires: 17  Plan of Care reviewed with: patient    Jessica LeJeune, PT, DPT

## 2017-11-22 NOTE — PLAN OF CARE
Problem: Patient Care Overview  Goal: Plan of Care Review  Outcome: Ongoing (interventions implemented as appropriate)  Pt s/p day 1 left knee replacement, oriented to self and time, occasional confusion to place and situation.  PRN pain medication administered for c/o pain, adequate relief achieved.  Foot pumps maintained throughout shift.  Lakhani draining clear, yellow urine.  NV checks throughout shift, no problems noted.  IV abx administered as ordered.  VS stable.  Pt has remained free of injuries and falls throughout shift.  Environment free of clutter, side rails up x2, and call light within reach.  Pt has verbalized understanding of plan of care.

## 2017-11-22 NOTE — PROGRESS NOTES
Pharmacist Renal Dose Adjustment Note    Shalini Donald is a 83 y.o. female being treated with the medication famotidine    Patient Data:    Vital Signs (Most Recent):  Temp: 98 °F (36.7 °C) (11/22/17 0827)  Pulse: 83 (11/22/17 0830)  Resp: 16 (11/22/17 0827)  BP: (!) 163/70 (11/22/17 0827)  SpO2: 95 % (11/22/17 0830)   Vital Signs (72h Range):  Temp:  [97.9 °F (36.6 °C)-98.2 °F (36.8 °C)]   Pulse:  [62-92]   Resp:  [14-20]   BP: (136-176)/(62-80)   SpO2:  [95 %-100 %]        Recent Labs     Lab 11/22/17 0527   CREATININE 0.7     Serum creatinine: 0.7 mg/dL 11/22/17 0527  Estimated creatinine clearance: 47.1 mL/min    Famotidine 20mg BID will be changed to famotidine 20mg BID      Pharmacist's Name: Angela Piedra  Pharmacist's Extension: [phone]

## 2017-11-22 NOTE — PLAN OF CARE
11/22/17 1554   Discharge Assessment   Assessment Type Discharge Planning Assessment   Confirmed/corrected address and phone number on facesheet? Yes   Assessment information obtained from? Patient;Other  (son, Moses at bedside )   Expected Length of Stay (days) 3   Communicated expected length of stay with patient/caregiver yes   Prior to hospitilization cognitive status: Alert/Oriented   Prior to hospitalization functional status: Independent   Current cognitive status: Alert/Oriented;Not Oriented to Place   Current Functional Status: Assistive Equipment;Needs Assistance   Lives With child(staci), adult  (Moses will be staying with the pt )   Able to Return to Prior Arrangements unable to determine at this time (comments)   Is patient able to care for self after discharge? Yes  (with assistance )   Readmission Within The Last 30 Days no previous admission in last 30 days   Patient currently being followed by outpatient case management? No   Patient currently receives any other outside agency services? No   Equipment Currently Used at Home rollator;bedside commode   Do you have any problems affording any of your prescribed medications? No  (pharm: [facility] [address] )   Is the patient taking medications as prescribed? yes   Does the patient have transportation home? Yes   Transportation Available family or friend will provide   Discharge Plan A Home Health   Patient/Family In Agreement With Plan yes     Pt will need  and [facility] at the time of discharge....MONROE Castro CM

## 2017-11-22 NOTE — NURSING
PT came to walk with pt she became anxious and mildly combative, using profane language at staff, she was encouraged back to bed and a sitter is with her, she has calmed down but is still confused she was only able or willing to take several steps, only got to the door of the room with walker and two person assist for stability.

## 2017-11-22 NOTE — PROGRESS NOTES
[facility]  Orthopedics  Progress Note    Patient Name: Shalini Donald  MRN: [id number]  Admission Date: 11/21/2017  Hospital Length of Stay: 1 days  Attending Provider: Joshua Mccabe MD  Primary Care Provider: Kali Mccullough MD  Follow-up For: Procedure(s) (LRB):  ARTHROPLASTY-KNEE (Left)    Post-Operative Day: 1 Day Post-Op  Subjective:pt alert,  N/v ok,  Drain out     No new subjective & objective note has been filed under this hospital service since the last note was generated.    Assessment/Plan:routine tka rehab     No notes have been filed under this hospital service.  Service: Orthopedic Surgery        Nick Aquino Jr, MD  Orthopedics  [facility]

## 2017-11-22 NOTE — PLAN OF CARE
Problem: Physical Therapy Goal  Goal: Physical Therapy Goal  Goals to be met by: 2017     Patient will increase functional independence with mobility by performin. Supine to sit with Stand-by Assistance  2. Sit to supine with Stand-by Assistance  3. Sit to stand transfer with Stand-by Assistance  4. Bed to chair transfer with Stand-by Assistance using Rolling Walker  5. Gait  x 150 feet with Stand-by Assistance using Rolling Walker.   6. Ascend/descend 3 stairs with bilateral Handrails Contact Guard Assistance using Rolling Walker.   7. Lower extremity exercise program x10 reps per handout, with supervision     Outcome: Ongoing (interventions implemented as appropriate)  PT for bed mobility, transfer training and gait.

## 2017-11-22 NOTE — ANESTHESIA POST-OP PAIN MANAGEMENT
Acute Pain Service Progress Note    Shalini Donald is a 83 y.o., female, [id number].    Surgery:  Knee arthroplasty    Post Op Day #: 1    Catheter type: perineural  femoral    Infusion type: Ropivacaine 0.2%  8 basal    Problem List:    Active Hospital Problems    Diagnosis  POA    *S/P total knee arthroplasty, left [Z96.652]  Not Applicable    Arthritis [M19.90]  Yes    Hypertension [I10]  Yes      Resolved Hospital Problems    Diagnosis Date Resolved POA   No resolved problems to display.       Subjective:     General appearance of alert, oriented, no complaints   Pain with rest: 2    Numbers   Pain with movement: 2    Numbers   Side Effects    1. Pruritis No    2. NauseNo    3. Motor Blockade No, 1=Ability to bend knees and ankles    4. Sedation No, 1=awake and alert    Objective:     Catheter level    Catheter site clean, dry, intact   Catheter placement       Vitals   Vitals:    11/22/17 0830   BP:    Pulse: 83   Resp:    Temp:         Labs    Admission on 11/21/2017   Component Date Value Ref Range Status    Specimen UA 11/21/2017 Urine, Catheterized   Final    Color, UA 11/21/2017 Yellow  Yellow, Straw, Stephanie Final    Appearance, UA 11/21/2017 Clear  Clear Final    pH, UA 11/21/2017 7.0  5.0 - 8.0 Final    Specific Gravity, UA 11/21/2017 1.010  1.005 - 1.030 Final    Protein, UA 11/21/2017 Negative  Negative Final    Glucose, UA 11/21/2017 Negative  Negative Final    Ketones, UA 11/21/2017 Negative  Negative Final    Bilirubin (UA) 11/21/2017 Negative  Negative Final    Occult Blood UA 11/21/2017 Trace* Negative Final    Nitrite, UA 11/21/2017 Negative  Negative Final    Urobilinogen, UA 11/21/2017 Negative  <2.0 EU/dL Final    Leukocytes, UA 11/21/2017 Negative  Negative Final    Sodium 11/22/2017 138  136 - 145 mmol/L Final    Potassium 11/22/2017 3.8  3.5 - 5.1 mmol/L Final    Chloride 11/22/2017 105  95 - 110 mmol/L Final    CO2 11/22/2017 26  23 - 29 mmol/L Final    Glucose 11/22/2017  124* 70 - 110 mg/dL Final    BUN, Bld 11/22/2017 8  8 - 23 mg/dL Final    Creatinine 11/22/2017 0.7  0.5 - 1.4 mg/dL Final    Calcium 11/22/2017 8.3* 8.7 - 10.5 mg/dL Final    Anion Gap 11/22/2017 7* 8 - 16 mmol/L Final    eGFR if African American 11/22/2017 >60  >60 mL/min/1.73 m^2 Final    eGFR if non African American 11/22/2017 >60  >60 mL/min/1.73 m^2 Final    WBC 11/22/2017 5.70  3.90 - 12.70 K/uL Final    RBC 11/22/2017 3.45* 4.00 - 5.40 M/uL Final    Hemoglobin 11/22/2017 11.2* 12.0 - 16.0 g/dL Final    Hematocrit 11/22/2017 33.1* 37.0 - 48.5 % Final    MCV 11/22/2017 96  82 - 98 fL Final    MCH 11/22/2017 32.4* 27.0 - 31.0 pg Final    MCHC 11/22/2017 33.8  32.0 - 36.0 g/dL Final    RDW 11/22/2017 12.9  11.5 - 14.5 % Final    Platelets 11/22/2017 222  150 - 350 K/uL Final    MPV 11/22/2017 7.4* 9.2 - 12.9 fL Final    Gran # 11/22/2017 3.4  1.8 - 7.7 K/uL Final    Lymph # 11/22/2017 1.4  1.0 - 4.8 K/uL Final    Mono # 11/22/2017 0.7  0.3 - 1.0 K/uL Final    Eos # 11/22/2017 0.1  0.0 - 0.5 K/uL Final    Baso # 11/22/2017 0.00  0.00 - 0.20 K/uL Final    Gran% 11/22/2017 60.5  38.0 - 73.0 % Final    Lymph% 11/22/2017 24.7  18.0 - 48.0 % Final    Mono% 11/22/2017 12.1  4.0 - 15.0 % Final    Eosinophil% 11/22/2017 2.3  0.0 - 8.0 % Final    Basophil% 11/22/2017 0.4  0.0 - 1.9 % Final    Differential Method 11/22/2017 Automated   Final        Meds   Current Facility-Administered Medications   Medication Dose Route Frequency Provider Last Rate Last Dose    acetaminophen tablet 650 mg  650 mg Oral Q4H PRN Nick Aquino Jr., MD        aspirin tablet 325 mg  325 mg Oral BID Nick Aquino Jr., MD   325 mg at 11/22/17 0916    bisacodyl suppository 10 mg  10 mg Rectal Daily PRN Nick Aquino Jr., MD        dextrose 5 % and 0.45 % NaCl infusion   Intravenous Continuous Aqib MD Eliot 75 mL/hr at 11/21/17 1930      famotidine tablet 20 mg  20 mg Oral BID Nick Aquino Jr., MD   20 mg at  11/22/17 0917    hydrocodone-acetaminophen 5-325mg per tablet 1 tablet  1 tablet Oral Q4H PRN Nick Aquino Jr., MD   1 tablet at 11/22/17 0935    losartan tablet 100 mg  100 mg Oral Daily Nick Aquino Jr., MD   100 mg at 11/22/17 0916    mupirocin 2 % ointment 1 g  1 g Nasal BID Nick Aquino Jr., MD   1 g at 11/22/17 0917    ondansetron disintegrating tablet 4 mg  4 mg Oral Once Nick Aquino Jr., MD        ondansetron injection 4 mg  4 mg Intravenous Q12H PRN Nick Aquino Jr., MD        oxyCODONE immediate release tablet 10 mg  10 mg Oral Q4H PRN Nick Aquino Jr., MD        promethazine (PHENERGAN) 6.25 mg in dextrose 5 % 50 mL IVPB  6.25 mg Intravenous Q6H PRN Nick Aquino Jr., MD        vancomycin (VANCOCIN) 1,000 mg in sodium chloride 0.9% 250 mL IVPB  1,000 mg Intravenous Q12H Joshua Mccabe MD        zolpidem tablet 5 mg  5 mg Oral Nightly PRN Nick Aquino Jr., MD            Anticoagulant dose    Assessment:     Pain control adequate    Plan:     Patient doing well, continue present treatment.    Evaluator Carson Silva

## 2017-11-22 NOTE — PLAN OF CARE
Pt awake alert and oriented, vs stable, PRN pain medications given, no complaints of pain at this time, Q ball in place, cryotherapy in use to L knee, bilateral foot SCDs on, tolerated clear liquids, IV fluids infused. Ok per Dr. Man to send pt to the floor. Pt transported via bed from PACU to room 422. Attempted to call pt's son but no answer and no voicemail was set up to leave a message. Sent son a message thru text notifications. Call light within reach. Report given to MONROE Oneal.

## 2017-11-22 NOTE — PLAN OF CARE
Problem: Occupational Therapy Goal  Goal: Occupational Therapy Goal  Goals to be met by: 12/2/2017     Patient will increase functional independence with ADLs by performing:    LE Dressing with Minimal Assistance.  Toileting from toilet with Stand-by Assistance for hygiene and clothing management.   Stand pivot transfers with Stand-by Assistance.  Toilet transfer to toilet with Stand-by Assistance.    Outcome: Ongoing (interventions implemented as appropriate)  OT evaluation completed today. Goals & care plan established.    GUEVARA Trejo  11/22/2017

## 2017-11-22 NOTE — PT/OT/SLP EVAL
Occupational Therapy  Evaluation    Shalini Donald   MRN: [id number]   Admitting Diagnosis: S/P total knee arthroplasty, left    OT Date of Treatment: 11/22/17              Billable Minutes:  Evaluation 10  Self Care/Home Management 25    Diagnosis: S/P total knee arthroplasty, left       Past Medical History:   Diagnosis Date    Arthritis     Back pain     Hypertension     Wears glasses     Wears partial dentures     upper      Past Surgical History:   Procedure Laterality Date    CHOLECYSTECTOMY      EYE SURGERY Bilateral     cataracts    JOINT REPLACEMENT Right     knee       Referring physician: Miles  Date referred to OT: 11/21/17    General Precautions: Standard, fall  Orthopedic Precautions: LLE weight bearing as tolerated  Braces: N/A          Patient History:  Living Environment  Lives With: alone  Living Arrangements: house  Home Accessibility: stairs to enter home  Home Layout: Able to live on 1st floor  Number of Stairs to Enter Home: 3  Stair Railings at Home: outside, present at both sides  Transportation Available: family or friend will provide  Equipment Currently Used at Home: none    Prior level of function:   Bed Mobility/Transfers: independent  Grooming: independent  Bathing: independent  Upper Body Dressing: independent  Lower Body Dressing: independent  Toileting: independent  Home Management Skills: independent  Homemaking Responsibilities: Yes  Mode of Transportation: Family  Occupation: Retired     Dominant hand: right    Subjective:  Communicated with nurse prior to session.  Pt agreeable to OT evaluation  Chief Complaint: LLE pain  Patient/Family stated goals: home    Pain/Comfort  Pain Rating 1: 2/10  Location - Side 1: Left  Location - Orientation 1: generalized  Location 1: knee  Pain Addressed 1: Nurse notified, Distraction  Pain Rating Post-Intervention 1: 2/10    Objective:  Patient found with: perineural catheter, peripheral IV, Polar ice    Cognitive Exam:  Oriented to:  x4  Follows Commands/attention: Follows two-step commands; pt required frequent re-direction to remain on task.   Communication: clear/fluent  Memory:  Impaired STM  Safety awareness/insight to disability: impaired  Coping skills/emotional control: Pt appeared persistently anxious throughout eval    Visual/perceptual:  Intact    Physical Exam:  Postural examination/scapula alignment: Rounded shoulder, Head forward and Posterior pelvic tilt  Skin integrity: Visible skin intact  Edema: None noted     Sensation:   Intact    Upper Extremity Range of Motion:  Right Upper Extremity: WFL  Left Upper Extremity: WFL    Upper Extremity Strength:  Right Upper Extremity: WFL  Left Upper Extremity: WFL   Strength: WFL    Fine motor coordination:   Intact    Gross motor coordination: WFL    Functional Mobility:  Bed Mobility:  Scooting/Bridging: Stand by Assistance    Transfers:  Sit <> Stand Assistance: Minimum Assistance  Sit <> Stand Assistive Device: Rolling Walker  Toilet Transfer Technique: Stand Pivot  Toilet Transfer Assistance: Minimum Assistance  Toilet Transfer Assistive Device: Rolling Walker    Functional Ambulation: Pt ambulated with RW and CGA from chair to bathroom, to sink, and back to chair. Pt appeared confused at times, requiring cues to re-direct back to chair.     Activities of Daily Living:  LE Dressing Level of Assistance: Maximum assistance (Independent with R LE; max A with L LE.)  Toileting Where Assessed: Toilet  Toileting Level of Assistance: Minimum assistance      Balance:   Static Sit: GOOD+: Takes MAXIMAL challenges from all directions.    Dynamic Sit: GOOD+: Maintains balance through MAXIMAL excursions of active trunk motion  Static Stand: FAIR+: Takes MINIMAL challenges from all directions  Dynamic stand: FAIR: Needs CONTACT GUARD during gait    Therapeutic Activities and Exercises:  OT ed patient on safety with walker use for functional mobility with cues for hand placement & sequencing.  "  OT ed pt on use of adaptive equipment for LB dressing & safe item retrieval with reacher with demonstration provided.      AM-PAC 6 CLICK ADL  How much help from another person does this patient currently need?  1 = Unable, Total/Dependent Assistance  2 = A lot, Maximum/Moderate Assistance  3 = A little, Minimum/Contact Guard/Supervision  4 = None, Modified Dafter/Independent    Putting on and taking off regular lower body clothing? : 2  Bathing (including washing, rinsing, drying)?: 2  Toileting, which includes using toilet, bedpan, or urinal? : 3  Putting on and taking off regular upper body clothing?: 3  Taking care of personal grooming such as brushing teeth?: 3  Eating meals?: 4  Total Score: 17    AM-PAC Raw Score CMS "G-Code Modifier Level of Impairment Assistance   6 % Total / Unable   7 - 9 CM 80 - 100% Maximal Assist   10-14 CL 60 - 80% Moderate Assist   15 - 19 CK 40 - 60% Moderate Assist   20 - 22 CJ 20 - 40% Minimal Assist   23 CI 1-20% SBA / CGA   24 CH 0% Independent/ Mod I       Patient left up in chair with all lines intact, call button in reach and nurse notified    Assessment:  Shalini Donald is a 83 y.o. female with a medical diagnosis of S/P total knee arthroplasty, left and presents with a decline in functional status due to the below listed impairments, impacting ADLs and functional mobility.  Recommend OT treatment to maximize endurance, safety & independence with ADL's & functional mobility.      Rehab identified problem list/impairments: Rehab identified problem list/impairments: weakness, impaired endurance, impaired self care skills, impaired functional mobilty, gait instability, impaired balance, impaired cognition, decreased lower extremity function, decreased safety awareness, pain, decreased ROM, impaired joint extensibility, orthopedic precautions    Rehab potential is fair.    Activity tolerance: Fair    Discharge recommendations: Discharge Facility/Level Of Care " Needs: home health PT, home health OT     Barriers to discharge: Barriers to Discharge: Inaccessible home environment    Equipment recommendations: walker, rolling     GOALS:    Occupational Therapy Goals        Problem: Occupational Therapy Goal    Goal Priority Disciplines Outcome Interventions   Occupational Therapy Goal     OT, PT/OT Ongoing (interventions implemented as appropriate)    Description:  Goals to be met by: 12/2/2017     Patient will increase functional independence with ADLs by performing:    LE Dressing with Minimal Assistance.  Toileting from toilet with Stand-by Assistance for hygiene and clothing management.   Stand pivot transfers with Stand-by Assistance.  Toilet transfer to toilet with Stand-by Assistance.                      PLAN:  Patient to be seen 3 x/week, 4 x/week to address the above listed problems via self-care/home management, therapeutic activities, therapeutic exercises  Plan of Care expires: 12/02/17  Plan of Care reviewed with: patient         Art CARLYN Stewart, OT  11/22/2017

## 2017-11-22 NOTE — PROGRESS NOTES
Progress Note  Hospital Medicine  Patient Name:Shalini Donald  MRN:  [id number]  Patient Class: IP- Inpatient  Admit Date: 11/21/2017  Length of Stay: 1 days  Expected Discharge Date:   Attending Physician: Joshua Mccabe MD  Primary Care Provider:  Kali Mccullough MD    SUBJECTIVE:     Principal Problem: S/P total knee arthroplasty, left  Initial history of present illness: Patient is a 83 y.o. female admitted to Hospitalist Service from Operation Room s/p left total knee arthroplasty performed by Dr. Aquino. Patient reportedly has past medical history significant for OA, chronic lower back pain and hypertension. Patient is evaluate din recovery room. Patient is still sedated due to anesthetic agent. Not able to provide further details. No acute distress noted.     PMH/PSH/SH/FH/Meds: reviewed.    Symptoms/Review of Systems: Patient is confused, not oriented to place and time. Pain controlled. No shortness of breath, cough, chest pain or headache, fever or abdominal pain.     Diet:  Adequate intake.    Activity level: Normal.    Pain:  Patient reports no pain.       OBJECTIVE:   Vital Signs (Most Recent):      Temp: 98 °F (36.7 °C) (11/22/17 0827)  Pulse: 83 (11/22/17 0830)  Resp: 16 (11/22/17 0827)  BP: (!) 163/70 (11/22/17 0827)  SpO2: 95 % (11/22/17 0830)       Vital Signs Range (Last 24H):  Temp:  [97.9 °F (36.6 °C)-98.2 °F (36.8 °C)]   Pulse:  [62-83]   Resp:  [14-20]   BP: (136-176)/(62-80)   SpO2:  [95 %-100 %]     Weight: 54.4 kg (120 lb)  Body mass index is 22.31 kg/m².    Intake/Output Summary (Last 24 hours) at 11/22/17 1031  Last data filed at 11/22/17 0530   Gross per 24 hour   Intake          3558.33 ml   Output             2400 ml   Net          1158.33 ml     Physical Examination:  General appearance: well developed, appears stated age  Head: normocephalic, atraumatic  Eyes:  conjunctivae/corneas clear. PERRL.  Nose: Nares normal. Septum midline.  Throat: lips, mucosa, and tongue normal; teeth and gums  normal, no throat erythema.  Neck: supple, symmetrical, trachea midline, no JVD and thyroid not enlarged, symmetric, no tenderness/mass/nodules  Lungs:  clear to auscultation bilaterally and normal respiratory effort  Chest wall: no tenderness  Heart: regular rate and rhythm, S1, S2 normal, no murmur, click, rub or gallop  Abdomen: soft, non-tender non-distented; bowel sounds normal; no masses,  no organomegaly  Extremities: no cyanosis, clubbing or edema. Left knee dressing C/D/I with a LAUREN drain in place.  Pulses: 2+ and symmetric  Skin: Skin color, texture, turgor normal. No rashes or lesions.  Lymph nodes: Cervical, supraclavicular, and axillary nodes normal.  Neurologic: Sedated due to anesthetics.    CBC:    Recent Labs  Lab 11/22/17  0527   WBC 5.70   RBC 3.45*   HGB 11.2*   HCT 33.1*      MCV 96   MCH 32.4*   MCHC 33.8   BMP    Recent Labs  Lab 11/22/17  0527   *      K 3.8      CO2 26   BUN 8   CREATININE 0.7   CALCIUM 8.3*      Diagnostic Results:  Microbiology Results (last 7 days)     ** No results found for the last 168 hours. **         Left knee x-ray: Intraoperative left knee radiograph demonstrates good positioning of arthroplasty components.    Assessment/Plan:      *S/P total knee arthroplasty, left [Z96.652]   Not Applicable    Arthritis [M19.90]  Continue to follow Orthopedic recommendations.  Needs aggressive incentive spirometry.  Follow hemoglobin and hematocrit closely.  Pain control with PO narcotics and antiemetics as needed.  Physical therapy as per Orthopedics protocol with fall precautions.     Yes    Hypertension [I10]  Chronic problem. Will continue chronic medications and monitor for any changes, adjusting as needed.    Acute confusion - likely related to narcotics  Supportive care.  1) During the day, please open the shades and turn on the lights- If patient is in private room, please make sure they are close to the window.    2) Make sure the correct  date and time is written on the whiteboard, as well as the current care team  3) Minimize interruptions at night-time, close shades and turn off TV. No vitals between 11PM and 5AM  4) When possible, during daytime make sure patient is in chair and provide activities that engage patient.  5) Please make sure patient has hearing aids and glasses while awake.'   Yes         DVT prophylaxis: On  mg po bid as per Dr. Aquino.          VTE Risk Mitigation         Ordered     Low Risk of VTE  Once      11/21/17 9308        Joshua Mccabe MD  Department of Hospital Medicine   [facility]

## 2017-11-22 NOTE — NURSING
Pt is awake and oriented to person and , however she is disoriented to place, situation, date, time, she is forgetful and repeats questions frequently, she is difficult to reorient related to the forgetfulness.  She has to be reminded not to get up, not to pull on lines (qball, Iv)  o2 sat is fine, IS at bedside educating her was difficult related to her not following commands all the time, She will need almost constant monitoring, she is close to nurses desk and other staff member will assist with monitoring.

## 2017-11-22 NOTE — PLAN OF CARE
Problem: Physical Therapy Goal  Goal: Physical Therapy Goal  Goals to be met by: 2017     Patient will increase functional independence with mobility by performin. Supine to sit with Stand-by Assistance  2. Sit to supine with Stand-by Assistance  3. Sit to stand transfer with Stand-by Assistance  4. Bed to chair transfer with Stand-by Assistance using Rolling Walker  5. Gait  x 150 feet with Stand-by Assistance using Rolling Walker.   6. Ascend/descend 3 stairs with bilateral Handrails Contact Guard Assistance using Rolling Walker.   7. Lower extremity exercise program x10 reps per handout, with supervision    Outcome: Ongoing (interventions implemented as appropriate)  PT evaluation complete. Recommend home with HHPT and family assist at discharge.

## 2017-11-22 NOTE — PT/OT/SLP PROGRESS
"Physical Therapy  Treatment    Shalini Donald   MRN: [id number]   Admitting Diagnosis: S/P total knee arthroplasty, left    PT Received On: 11/22/17  PT Start Time: 1310     PT Stop Time: 1335    PT Total Time (min): 25 min       Billable Minutes:  Therapeutic Activity 25    Treatment Type: Treatment  PT/PTA: PTA     PTA Visit Number: 1       General Precautions: Standard, fall  Orthopedic Precautions: LLE weight bearing as tolerated   Braces: N/A    Do you have any cultural, spiritual, Hinduism conflicts, given your current situation?: None    Subjective:  Communicated with nurses Rowena and Brittany prior to session.  Pt agreeable to session after encouragement and orienting from PTA and nurse Brittany. Pt on phone with her neighbor, confused and agitated. Pt stating, "you wont let me do things my way in my own house. It has to be your way."    Pain/Comfort  Pain Rating 1:  (did not rate)  Location - Side 1: Left  Location 1: knee  Pain Addressed 1: Nurse notified, Distraction    Objective:   Patient found with: perineural catheter, peripheral IV, Polar ice; AvaSys    Functional Mobility:  Bed Mobility:   Sit to Supine: Minimum Assistance with L LE, increased time and cueing to initiate  Scooting: Minimum Assistance      Transfers:  Sit <> Stand Assistance: Stand By Assistance, Contact Guard Assistance (pt stood quickly without notice or AD in front. Pt assisted to seated position, edu on safety and RW placed infront of pt)  Sit <> Stand Assistive Device: No Assistive Device, Rolling Walker (cueing for safety and proper tech)  Bed <> Chair Technique: Stand Pivot  Bed <> Chair Assistance: Moderate Assistance (pt confused, agitated and began to push walker, PTA and PT tech away from self)  Bed <> Chair Assistive Device: Rolling Walker      Gait:   Gait Distance: 15'  Assistance 1: Minimum assistance (assistance for balance, safety and management of RW, as pt attempting to push walker away from self at times)  Gait Assistive " Device: Rolling walker  Gait Pattern: 3-point gait  Gait Deviation(s): decreased merrill, decreased velocity of limb motion, decreased step length, decreased toe-to-floor clearance, increased time in double stance, decreased weight-shifting ability    Balance:   Static Sit: GOOD: Takes MODERATE challenges from all directions  Dynamic Sit: GOOD: Maintains balance through MODERATE excursions of active trunk movement  Static Stand: FAIR: Maintains without assist but unable to take challenges  Dynamic stand: POOR: N/A     Therapeutic Activities and Exercises:  Increased time, assistance, cueing and orienting requried to complete all mobility safely 2' pt confusion and agitation.      Patient left supine with all lines intact, call button in reach and nurses Brittany and Rowena notified/present.    Assessment:  Shalini Donald is a 83 y.o. female with a medical diagnosis of S/P total knee arthroplasty, left and presents with impaired cognition/judgement, confusion and agitation limiting mobility and safety.    Rehab identified problem list/impairments: Rehab identified problem list/impairments: weakness, impaired endurance, impaired self care skills, impaired functional mobilty, gait instability, impaired balance, impaired cognition, decreased lower extremity function, decreased safety awareness, pain, decreased ROM, impaired joint extensibility, orthopedic precautions    Rehab potential is good.    Activity tolerance: Poor    Discharge recommendations: Discharge Facility/Level Of Care Needs: home health PT     Equipment recommendations: Equipment Needed After Discharge: walker, rolling     GOALS:    Physical Therapy Goals        Problem: Physical Therapy Goal    Goal Priority Disciplines Outcome Goal Variances Interventions   Physical Therapy Goal     PT/OT, PT Ongoing (interventions implemented as appropriate)     Description:  Goals to be met by: 11/29/2017     Patient will increase functional independence with mobility by  performin. Supine to sit with Stand-by Assistance  2. Sit to supine with Stand-by Assistance  3. Sit to stand transfer with Stand-by Assistance  4. Bed to chair transfer with Stand-by Assistance using Rolling Walker  5. Gait  x 150 feet with Stand-by Assistance using Rolling Walker.   6. Ascend/descend 3 stairs with bilateral Handrails Contact Guard Assistance using Rolling Walker.   7. Lower extremity exercise program x10 reps per handout, with supervision                      PLAN:    Patient to be seen BID  to address the above listed problems via therapeutic exercises, therapeutic activities  Plan of Care expires: 17  Plan of Care reviewed with: patient         Adriane CRYSTAL Davis, PTA  2017

## 2017-11-22 NOTE — OP NOTE
DATE OF PROCEDURE:  11/21/2017.    PROCEDURE:  Total knee arthroplasty of the left.    PREOPERATIVE DIAGNOSIS:  Osteoarthritis of the left knee.    POSTOPERATIVE DIAGNOSIS:  Osteoarthritis of the left knee.    ANESTHESIA:  General.    SURGEON:  Dr. Aquino.    ASSISTANT:  Vinicio Doshi.    PROCEDURE IN DETAIL:  The patient was brought to the operating room and placed   under general anesthesia.  Left leg was prepped and draped in satisfactory   manner, exsanguinated the Esmarch and elevated the tourniquet to 300.  I made a   medial parapatellar incision, went through skin and subcutaneous medial   arthrotomy VMO split.  We then mobilized her patella.  There are rough cut on   the patella and moved it the way.  Exposed our proximal tibia and put on our   external tibial cutting guide and made our proximal tibial cut.  Good bone   stock.  We then exposed our distal femur after removing the tibia and put our   intramedullary guide up the femur, put on our anterior referencing device and   made our anterior reference cut on the femur.  I then brought in the finishing   block at 4 degrees distal cut on the femur and then sized the femur to a 3   Zohaib Triathlon, total finishing block made anterior, posterior chamfer cuts.    I then went posteriorly and removed the remaining menisci and any debris and   exposed our tibia.  We then cleaned up our tibia and then put on our tibial   tray, got the rotation and did a keel punch and then did a trial reduction with   a 9-mm poly.  The knee was still a little tight in extension, a little tight in   flexion needed to take a tiny bit more bone off.  So, I went back and took   another millimeter or so bone off the tibia, put the plate back on and did   another trial reduction again with a 9 poly and the femur 3, tibia 3, that gave   us good balance and good extension.  We then went and finished our patella did a   rough final cut on the patella, size 32 drilled our 3 holes for an  oblong   all-poly patella and the x-ray was then taken.  AP x-ray showed the coverage and   alignment looked quite satisfactory.  I removed all our trials, put some bone   in the femoral canal.  We then cemented on our tibial tray, size 3, 9 mm deep   dish X3 poly cruciate retaining and then bit a Pressfit femur, size 3 because we   had good bone and then we cemented on our patella 32, removed our excess   cement.  Everything was going smoothly.  Once the cement was hardened, we   checked our extensor mechanism seemed to do well.  We advanced the VMO slightly   and closed our medial retinaculum.  I checked the extensor mechanism again and   then we let the tourniquet down happy with our balance and our motion and then   left one, Vishal-Cunningham drain inside the knee brought out through a separate   stab wound and closed sub-Q with 2-0 Vicryl and closed the skin with running   subcuticular Monocryl and Steri-Strips.  She was then dressed, reversed from   anesthesia, taken to the Recovery Room in satisfactory condition, tolerated the   procedure well.      VERITO  dd: 11/21/2017 16:50:28 (CST)  td: 11/21/2017 22:03:34 (CST)  Doc ID   #[id number]  Job ID #[id number]    CC:

## 2017-11-22 NOTE — ANESTHESIA POSTPROCEDURE EVALUATION
"Anesthesia Post Evaluation    Patient: Shalini Donald    Procedure(s) Performed: Procedure(s) (LRB):  ARTHROPLASTY-KNEE (Left)    Final Anesthesia Type: general  Patient location during evaluation: PACU  Patient participation: Yes- Able to Participate  Level of consciousness: awake and alert and oriented  Post-procedure vital signs: reviewed and stable  Pain management: adequate  Airway patency: patent  PONV status at discharge: No PONV  Anesthetic complications: no      Cardiovascular status: blood pressure returned to baseline and stable  Respiratory status: unassisted and spontaneous ventilation  Hydration status: euvolemic  Follow-up not needed.        Visit Vitals  BP (!) 176/72   Pulse 66   Temp 36.8 °C (98.2 °F) (Temporal)   Resp 20   Ht 5' 1.5" (1.562 m)   Wt 54.4 kg (120 lb)   SpO2 99%   Breastfeeding? No   BMI 22.31 kg/m²       Pain/Reji Score: Pain Assessment Performed: Yes (11/21/2017  6:10 PM)  Presence of Pain: denies (11/21/2017  6:10 PM)  Pain Rating Prior to Med Admin: 3 (11/21/2017  5:49 PM)  Pain Rating Post Med Admin: 0 (11/21/2017  5:55 PM)  Reji Score: 9 (11/21/2017  6:10 PM)      "

## 2017-11-23 LAB
ANION GAP SERPL CALC-SCNC: 11 MMOL/L
BASOPHILS # BLD AUTO: 0 K/UL
BASOPHILS NFR BLD: 0.4 %
BUN SERPL-MCNC: 8 MG/DL
CALCIUM SERPL-MCNC: 9.3 MG/DL
CHLORIDE SERPL-SCNC: 104 MMOL/L
CO2 SERPL-SCNC: 25 MMOL/L
CREAT SERPL-MCNC: 0.7 MG/DL
DIFFERENTIAL METHOD: ABNORMAL
EOSINOPHIL # BLD AUTO: 0.1 K/UL
EOSINOPHIL NFR BLD: 1.5 %
ERYTHROCYTE [DISTWIDTH] IN BLOOD BY AUTOMATED COUNT: 12.9 %
EST. GFR  (AFRICAN AMERICAN): >60 ML/MIN/1.73 M^2
EST. GFR  (NON AFRICAN AMERICAN): >60 ML/MIN/1.73 M^2
GLUCOSE SERPL-MCNC: 103 MG/DL
HCT VFR BLD AUTO: 33.7 %
HGB BLD-MCNC: 11.6 G/DL
LYMPHOCYTES # BLD AUTO: 1.4 K/UL
LYMPHOCYTES NFR BLD: 16.9 %
MCH RBC QN AUTO: 32.2 PG
MCHC RBC AUTO-ENTMCNC: 34.3 G/DL
MCV RBC AUTO: 94 FL
MONOCYTES # BLD AUTO: 1.1 K/UL
MONOCYTES NFR BLD: 13.1 %
NEUTROPHILS # BLD AUTO: 5.7 K/UL
NEUTROPHILS NFR BLD: 68.1 %
PLATELET # BLD AUTO: 243 K/UL
PMV BLD AUTO: 7.8 FL
POTASSIUM SERPL-SCNC: 3.8 MMOL/L
RBC # BLD AUTO: 3.6 M/UL
SODIUM SERPL-SCNC: 140 MMOL/L
WBC # BLD AUTO: 8.4 K/UL

## 2017-11-23 PROCEDURE — 97116 GAIT TRAINING THERAPY: CPT

## 2017-11-23 PROCEDURE — 36415 COLL VENOUS BLD VENIPUNCTURE: CPT

## 2017-11-23 PROCEDURE — 97530 THERAPEUTIC ACTIVITIES: CPT

## 2017-11-23 PROCEDURE — 99232 SBSQ HOSP IP/OBS MODERATE 35: CPT | Mod: ,,, | Performed by: INTERNAL MEDICINE

## 2017-11-23 PROCEDURE — 99900035 HC TECH TIME PER 15 MIN (STAT)

## 2017-11-23 PROCEDURE — 25000003 PHARM REV CODE 250: Performed by: ORTHOPAEDIC SURGERY

## 2017-11-23 PROCEDURE — 11000001 HC ACUTE MED/SURG PRIVATE ROOM

## 2017-11-23 PROCEDURE — 80048 BASIC METABOLIC PNL TOTAL CA: CPT

## 2017-11-23 PROCEDURE — 94760 N-INVAS EAR/PLS OXIMETRY 1: CPT

## 2017-11-23 PROCEDURE — 85025 COMPLETE CBC W/AUTO DIFF WBC: CPT

## 2017-11-23 PROCEDURE — 25000003 PHARM REV CODE 250: Performed by: INTERNAL MEDICINE

## 2017-11-23 RX ADMIN — ACETAMINOPHEN 650 MG: 325 TABLET, FILM COATED ORAL at 01:11

## 2017-11-23 RX ADMIN — ACETAMINOPHEN 650 MG: 325 TABLET, FILM COATED ORAL at 11:11

## 2017-11-23 RX ADMIN — ACETAMINOPHEN 650 MG: 325 TABLET, FILM COATED ORAL at 12:11

## 2017-11-23 RX ADMIN — MUPIROCIN 1 G: 20 OINTMENT TOPICAL at 09:11

## 2017-11-23 RX ADMIN — LOSARTAN POTASSIUM 100 MG: 25 TABLET, FILM COATED ORAL at 09:11

## 2017-11-23 RX ADMIN — FAMOTIDINE 20 MG: 20 TABLET, FILM COATED ORAL at 09:11

## 2017-11-23 NOTE — PT/OT/SLP PROGRESS
Physical Therapy  Treatment    Shalini Donald   MRN: [id number]   Admitting Diagnosis: S/P total knee arthroplasty, left    PT Received On: 11/23/17  PT Start Time: 1115     PT Stop Time: 1128    PT Total Time (min): 13 min       Billable Minutes:  Therapeutic Activity 13    Treatment Type: Treatment  PT/PTA: PT     PTA Visit Number: 1       General Precautions: Standard, fall  Orthopedic Precautions: LLE weight bearing as tolerated   Braces:      Do you have any cultural, spiritual, Hoahaoism conflicts, given your current situation?: None    Subjective:  Communicated with RN and Aide prior to session.           Objective:        Functional Mobility:  Bed Mobility:   Rolling/Turning to Left: Stand by assistance  Rolling/Turning Right: Stand by assistance  Scooting/Bridging: Stand by Assistance  Supine to Sit: Contact Guard Assistance  Sit to Supine: Contact Guard Assistance    Transfers:  Sit <> Stand Assistance: Contact Guard Assistance  Sit <> Stand Assistive Device: Rolling Walker    Gait:   Gait Distance: 50'  Assistance 1: Contact Guard Assistance  Gait Assistive Device: Rolling walker  Gait Pattern: 3-point gait  Gait Deviation(s): decreased merrill, decreased velocity of limb motion, decreased stride length, decreased toe-to-floor clearance, increased time in double stance, decreased weight-shifting ability    Therapeutic Activities and Exercises:  Sitting EOB with passive knee hang 90 degrees  Sit <> squat with forward reaching activity  Passive heels slides while adjusting sheets x 5 reps    AM-PAC 6 CLICK MOBILITY  How much help from another person does this patient currently need?   1 = Unable, Total/Dependent Assistance  2 = A lot, Maximum/Moderate Assistance  3 = A little, Minimum/Contact Guard/Supervision  4 = None, Modified [place]/Independent    Turning over in bed (including adjusting bedclothes, sheets and blankets)?: 3  Sitting down on and standing up from a chair with arms (e.g., wheelchair,  bedside commode, etc.): 3  Moving from lying on back to sitting on the side of the bed?: 3  Moving to and from a bed to a chair (including a wheelchair)?: 3  Need to walk in hospital room?: 3  Climbing 3-5 steps with a railing?: 1  Total Score: 16    AM-PAC Raw Score CMS G-Code Modifier Level of Impairment Assistance   6 % Total / Unable   7 - 9 CM 80 - 100% Maximal Assist   10 - 14 CL 60 - 80% Moderate Assist   15 - 19 CK 40 - 60% Moderate Assist   20 - 22 CJ 20 - 40% Minimal Assist   23 CI 1-20% SBA / CGA   24 CH 0% Independent/ Mod I     Patient left supine with call button in reach, RN notified and Aide present.    Assessment:  Shalini Donald is a 83 y.o. female with a medical diagnosis of S/P total knee arthroplasty, left and presents with the followingL    Rehab identified problem list/impairments: Rehab identified problem list/impairments: weakness, impaired endurance, impaired self care skills, impaired functional mobilty, impaired balance, impaired cognition, decreased lower extremity function, decreased coordination, decreased safety awareness, pain, orthopedic precautions    Rehab potential is good.    Activity tolerance: Good    Discharge recommendations: Discharge Facility/Level Of Care Needs: home health PT     Barriers to discharge: Barriers to Discharge: Inaccessible home environment    Equipment recommendations: Equipment Needed After Discharge: walker, rolling     GOALS:    Physical Therapy Goals        Problem: Physical Therapy Goal    Goal Priority Disciplines Outcome Goal Variances Interventions   Physical Therapy Goal     PT/OT, PT Ongoing (interventions implemented as appropriate)     Description:  Goals to be met by: 2017     Patient will increase functional independence with mobility by performin. Supine to sit with Stand-by Assistance  2. Sit to supine with Stand-by Assistance  3. Sit to stand transfer with Stand-by Assistance  4. Bed to chair transfer with Stand-by  Assistance using Rolling Walker  5. Gait  x 150 feet with Stand-by Assistance using Rolling Walker.   6. Ascend/descend 3 stairs with bilateral Handrails Contact Guard Assistance using Rolling Walker.   7. Lower extremity exercise program x10 reps per handout, with supervision                      PLAN:    Patient to be seen BID  to address the above listed problems via gait training, therapeutic activities, therapeutic exercises  Plan of Care expires: 12/21/17  Plan of Care reviewed with: patient         Jann Caldwell, PT  11/23/2017

## 2017-11-23 NOTE — ANESTHESIA POST-OP PAIN MANAGEMENT
Anesthesia Acute Pain Service Follow up Note    Shalini Donald : 1934 MRN: [id number]      Date of Procedure: 2017    Procedure(s) (LRB):  ARTHROPLASTY-KNEE (Left)    Post-op: 2 Days Post-Op     Q ball ripped out at the beginning of shift by  the patient.        DOMINGO LEWIS MD  Department of Anesthesiology   [facility]

## 2017-11-23 NOTE — ADDENDUM NOTE
Addendum  created 11/23/17 0729 by Efrain Colbert MD    Anesthesia Event edited, Sign clinical note

## 2017-11-23 NOTE — PLAN OF CARE
1000: dsg removed and replaced with bordered gauze per PA request.    1030: Family delivered pts own walker and stated she had a potty chair at home.  Pt resting quietly at this time so family requesting we call when awake.

## 2017-11-23 NOTE — PLAN OF CARE
Pt more agitated at this time stating that all her furniture is gone and believes she is at home.  Denies pain in her knee.  Attempting to get out of bed and believes we are holding her hostage.  Called son Moses to update.  Stated he will be up shortly.

## 2017-11-23 NOTE — PLAN OF CARE
Problem: Patient Care Overview  Goal: Plan of Care Review  Patient confused throughout the shift, attempting to get out of bed without assistance. Sitter to remain at bedside. Patient has no s/s of infection at this time, medications given per orders, tylenol for pain, no falls at this time, bed in the lowest and locked position, side rails up x2, call light in reach, will continue to monitor.

## 2017-11-23 NOTE — PLAN OF CARE
Left femoral block wire noted lying in bed, with tip intact. Per sitter, patient had pulled it out. Occlusive dressing remained intact, with scant amount of old serosanguinous drainage noted. Patient remains very agitated, fidgety. Sitter at bedside. Attempts to reorient frequently unsuccessful. CRISTINO system in use.

## 2017-11-23 NOTE — PLAN OF CARE
Problem: Patient Care Overview  Goal: Plan of Care Review  Outcome: Ongoing (interventions implemented as appropriate)  Pt is Awake and alert. Oriented to self. She is confused about the time and her role as a pt. She is very anxious and hyperactive, looking for something to do. Sitter is at the bedside and CRISTINO monitor is in use. Pt has not slept tonight. She is refusing plexi pulses and Cryo therapy. Q ball ripped out at the beginning of shift.  IS at bedside, pt difficult to encourage. NV checks q 4 hours. Pt is up to bedside commode frequently assist x2. Pain controlled with prn meds. Frequent reorientation. Continuous fluids held due to pt pulling IV line. PO fluids encouraged. IV reinforced with coband. Frequent instructions to leave Iv.

## 2017-11-23 NOTE — PROGRESS NOTES
Progress Note  Hospital Medicine  Patient Name:Shalini Donald  MRN:  [id number]  Patient Class: IP- Inpatient  Admit Date: 11/21/2017  Length of Stay: 2 days  Expected Discharge Date:   Attending Physician: Joshua Mccabe MD  Primary Care Provider:  Kali Mccullough MD    SUBJECTIVE:     Principal Problem: S/P total knee arthroplasty, left  Initial history of present illness: Patient is a 83 y.o. female admitted to Hospitalist Service from [facility] s/p left total knee arthroplasty performed by Dr. Aquino. Patient reportedly has past medical history significant for OA, chronic lower back pain and hypertension. Patient is evaluate din recovery room. Patient is still sedated due to anesthetic agent. Not able to provide further details. No acute distress noted.     PMH/PSH/SH/FH/Meds: reviewed.    Symptoms/Review of Systems: Patient is confused, not oriented to place and time. Tmax 100.8. No focal complaints. Pain controlled. No shortness of breath, cough, chest pain or headache, fever or abdominal pain.     Diet:  Adequate intake.    Activity level: Normal.    Pain:  Patient reports no pain.       OBJECTIVE:   Vital Signs (Most Recent):      Temp: 98.4 °F (36.9 °C) (11/22/17 2000)  Pulse: 104 (11/22/17 2000)  Resp: 20 (11/22/17 2000)  BP: (!) 186/86 (11/22/17 2000)  SpO2: 97 % (11/22/17 2100)       Vital Signs Range (Last 24H):  Temp:  [98 °F (36.7 °C)-100.8 °F (38.2 °C)]   Pulse:  []   Resp:  [16-20]   BP: (135-186)/(62-86)   SpO2:  [94 %-97 %]     Weight: 54.4 kg (120 lb)  Body mass index is 22.31 kg/m².    Intake/Output Summary (Last 24 hours) at 11/23/17 0750  Last data filed at 11/23/17 0700   Gross per 24 hour   Intake             1270 ml   Output                0 ml   Net             1270 ml     Physical Examination:  General appearance: well developed, appears stated age  Head: normocephalic, atraumatic  Eyes:  conjunctivae/corneas clear. PERRL.  Nose: Nares normal. Septum midline.  Throat: lips, mucosa,  and tongue normal; teeth and gums normal, no throat erythema.  Neck: supple, symmetrical, trachea midline, no JVD and thyroid not enlarged, symmetric, no tenderness/mass/nodules  Lungs:  clear to auscultation bilaterally and normal respiratory effort  Chest wall: no tenderness  Heart: regular rate and rhythm, S1, S2 normal, no murmur, click, rub or gallop  Abdomen: soft, non-tender non-distented; bowel sounds normal; no masses,  no organomegaly  Extremities: no cyanosis, clubbing or edema. Left knee dressing C/D/I with a LAUREN drain in place.  Pulses: 2+ and symmetric  Skin: Skin color, texture, turgor normal. No rashes or lesions.  Lymph nodes: Cervical, supraclavicular, and axillary nodes normal.  Neurologic: Sedated due to anesthetics.    CBC:    Recent Labs  Lab 11/22/17 0527 11/23/17 0627   WBC 5.70 8.40   RBC 3.45* 3.60*   HGB 11.2* 11.6*   HCT 33.1* 33.7*    243   MCV 96 94   MCH 32.4* 32.2*   MCHC 33.8 34.3   BMP    Recent Labs  Lab 11/22/17 0527 11/23/17 0627   * 103    140   K 3.8 3.8    104   CO2 26 25   BUN 8 8   CREATININE 0.7 0.7   CALCIUM 8.3* 9.3      Diagnostic Results:  Microbiology Results (last 7 days)     ** No results found for the last 168 hours. **         Left knee x-ray: Intraoperative left knee radiograph demonstrates good positioning of arthroplasty components.    Assessment/Plan:      *S/P total knee arthroplasty, left [Z96.652]   Not Applicable    Arthritis [M19.90]  Continue to follow Orthopedic recommendations.  Needs aggressive incentive spirometry.  Follow hemoglobin and hematocrit closely.  Pain control with PO narcotics and antiemetics as needed.  Physical therapy as per Orthopedics protocol with fall precautions.  Obtain CXR, UA is negative.     Yes    Hypertension [I10]  Chronic problem. Will continue chronic medications and monitor for any changes, adjusting as needed.    Acute confusion - likely related to narcotics  Supportive care.  1) During  the day, please open the shades and turn on the lights- If patient is in private room, please make sure they are close to the window.    2) Make sure the correct date and time is written on the whiteboard, as well as the current care team  3) Minimize interruptions at night-time, close shades and turn off TV. No vitals between 11PM and 5AM  4) When possible, during daytime make sure patient is in chair and provide activities that engage patient.  5) Please make sure patient has hearing aids and glasses while awake.'   Yes         DVT prophylaxis: On  mg po bid as per Dr. Aquino.      Joshua Mccabe MD  Department of Hospital Medicine   [facility]

## 2017-11-23 NOTE — PT/OT/SLP PROGRESS
Physical Therapy  Treatment    Shalini Donald   MRN: [id number]   Admitting Diagnosis: S/P total knee arthroplasty, left    PT Received On: 11/23/17  PT Start Time: 0911     PT Stop Time: 0924    PT Total Time (min): 13 min       Billable Minutes:  Gait Training 13    Treatment Type: Treatment  PT/PTA: PT     PTA Visit Number: 1       General Precautions: Standard, fall  Orthopedic Precautions: LLE weight bearing as tolerated   Braces:      Do you have any cultural, spiritual, Anabaptist conflicts, given your current situation?: None    Subjective:  Communicated with MONROE Cartagena prior to session.  Mikayla Malin present in room.         Objective:        Functional Mobility:  Bed Mobility:   Rolling/Turning to Left: Stand by assistance  Rolling/Turning Right: Stand by assistance  Scooting/Bridging: Stand by Assistance  Supine to Sit: Contact Guard Assistance  Sit to Supine: Contact Guard Assistance    Transfers:  Sit <> Stand Assistance: Contact Guard Assistance  Sit <> Stand Assistive Device: Rolling Walker    Gait:   Gait Distance: 50'  Assistance 1: Contact Guard Assistance  Gait Assistive Device: Rolling walker  Gait Pattern: 3-point gait  Gait Deviation(s): decreased merrill, decreased velocity of limb motion, decreased stride length, decreased toe-to-floor clearance, increased time in double stance, decreased weight-shifting ability    Balance:   Static Sit: FAIR+: Able to take MINIMAL challenges from all directions  Dynamic Sit: FAIR+: Maintains balance through MINIMAL excursions of active trunk motion  Static Stand: FAIR+: Takes MINIMAL challenges from all directions  Dynamic stand: FAIR: Needs CONTACT GUARD during gait     AM-PAC 6 CLICK MOBILITY  How much help from another person does this patient currently need?   1 = Unable, Total/Dependent Assistance  2 = A lot, Maximum/Moderate Assistance  3 = A little, Minimum/Contact Guard/Supervision  4 = None, Modified [place]/Independent    Turning over in bed  (including adjusting bedclothes, sheets and blankets)?: 3  Sitting down on and standing up from a chair with arms (e.g., wheelchair, bedside commode, etc.): 3  Moving from lying on back to sitting on the side of the bed?: 3  Moving to and from a bed to a chair (including a wheelchair)?: 3  Need to walk in hospital room?: 3  Climbing 3-5 steps with a railing?: 1  Total Score: 16    AM-PAC Raw Score CMS G-Code Modifier Level of Impairment Assistance   6 % Total / Unable   7 - 9 CM 80 - 100% Maximal Assist   10 - 14 CL 60 - 80% Moderate Assist   15 - 19 CK 40 - 60% Moderate Assist   20 - 22 CJ 20 - 40% Minimal Assist   23 CI 1-20% SBA / CGA   24 CH 0% Independent/ Mod I     Patient left supine with call button in reach, RN notified and Aide present.    Assessment:  Shalini Donald is a 83 y.o. female with a medical diagnosis of S/P total knee arthroplasty, left and presents with the following:    Rehab identified problem list/impairments: Rehab identified problem list/impairments: weakness, impaired endurance, impaired self care skills, impaired functional mobilty, gait instability, impaired balance, impaired cognition, decreased coordination, decreased lower extremity function, decreased safety awareness, pain, orthopedic precautions    Rehab potential is good.    Activity tolerance: Good    Discharge recommendations: Discharge Facility/Level Of Care Needs: home health PT     Barriers to discharge: Barriers to Discharge: Inaccessible home environment    Equipment recommendations: Equipment Needed After Discharge: walker, rolling     GOALS:    Physical Therapy Goals        Problem: Physical Therapy Goal    Goal Priority Disciplines Outcome Goal Variances Interventions   Physical Therapy Goal     PT/OT, PT Ongoing (interventions implemented as appropriate)     Description:  Goals to be met by: 2017     Patient will increase functional independence with mobility by performin. Supine to sit with  Stand-by Assistance  2. Sit to supine with Stand-by Assistance  3. Sit to stand transfer with Stand-by Assistance  4. Bed to chair transfer with Stand-by Assistance using Rolling Walker  5. Gait  x 150 feet with Stand-by Assistance using Rolling Walker.   6. Ascend/descend 3 stairs with bilateral Handrails Contact Guard Assistance using Rolling Walker.   7. Lower extremity exercise program x10 reps per handout, with supervision                      PLAN:    Patient to be seen BID  to address the above listed problems via gait training, therapeutic activities, therapeutic exercises  Plan of Care expires: 12/21/17  Plan of Care reviewed with: patient         Jann Javi, PT  11/23/2017

## 2017-11-23 NOTE — PLAN OF CARE
"Could hear from nurses station, Son Moses at patients bedside, raising his voice to patient multiple times as she was trying to get out of bed, pulling at IV site and surgical dressing. Moses and his significant other came out to the desk stating he had to get out of here, "couldn't take it any more". Reassured him that we would take good care of his mother and that it was probably a good idea for him to go home since it seemed she agitated him and vice versa. Explained he could call anytime to check on her.    "

## 2017-11-23 NOTE — HPI
POD #3 S/P L TKA.   Continues with significant delirium  All narcotics have been put on hold at this time.

## 2017-11-23 NOTE — PROGRESS NOTES
"[facility]  Orthopedics  Progress Note    Patient Name: Shalini Donald  MRN: [id number]  Admission Date: 11/21/2017  Hospital Length of Stay: 2 days  Attending Provider: Joshua Mccabe MD  Primary Care Provider: Kali Mccullough MD  Follow-up For: Procedure(s) (LRB):  ARTHROPLASTY-KNEE (Left)    Post-Operative Day: 2 Days Post-Op  Subjective:     Principal Problem:S/P total knee arthroplasty, left    Principal Orthopedic Problem: S/P L TKA    Interval History: Delirium post op    Review of patient's allergies indicates:   Allergen Reactions    Pcn [penicillins] Rash    Tylenol [acetaminophen] Other (See Comments)     headaches       Current Facility-Administered Medications   Medication    acetaminophen tablet 650 mg    aspirin tablet 325 mg    bisacodyl suppository 10 mg    dextrose 5 % and 0.45 % NaCl infusion    famotidine tablet 20 mg    hydrocodone-acetaminophen 5-325mg per tablet 1 tablet    losartan tablet 100 mg    mupirocin 2 % ointment 1 g    ondansetron disintegrating tablet 4 mg    ondansetron injection 4 mg    promethazine (PHENERGAN) 6.25 mg in dextrose 5 % 50 mL IVPB    zolpidem tablet 5 mg     Objective:     Vital Signs (Most Recent):  Temp: 97.7 °F (36.5 °C) (11/23/17 0808)  Pulse: 94 (11/23/17 0808)  Resp: 20 (11/23/17 0808)  BP: (!) 173/77 (11/23/17 0808)  SpO2: 98 % (11/23/17 0808) Vital Signs (24h Range):  Temp:  [97.7 °F (36.5 °C)-100.8 °F (38.2 °C)] 97.7 °F (36.5 °C)  Pulse:  [] 94  Resp:  [16-20] 20  SpO2:  [94 %-98 %] 98 %  BP: (135-186)/(62-86) 173/77     Weight: 54.4 kg (120 lb)  Height: 5' 1.5" (156.2 cm)  Body mass index is 22.31 kg/m².      Intake/Output Summary (Last 24 hours) at 11/23/17 1010  Last data filed at 11/23/17 0700   Gross per 24 hour   Intake             1030 ml   Output                0 ml   Net             1030 ml       General    Vitals reviewed.  Constitutional: She appears well-developed and well-nourished.   Pulmonary/Chest: Effort " normal.   Abdominal: Soft. Bowel sounds are normal.   Neurological: She is alert.   Disoriented to everything         Back (L-Spine & T-Spine) / Neck (C-Spine) Exam     Comments:  LLE DNVI. Dressings clean and dry        Significant Labs:   CBC:   Recent Labs  Lab 11/22/17 0527 11/23/17 0627   WBC 5.70 8.40   HGB 11.2* 11.6*   HCT 33.1* 33.7*    243     CMP:   Recent Labs  Lab 11/22/17 0527 11/23/17 0627    140   K 3.8 3.8    104   CO2 26 25   * 103   BUN 8 8   CREATININE 0.7 0.7   CALCIUM 8.3* 9.3   ANIONGAP 7* 11   EGFRNONAA >60 >60     All pertinent labs within the past 24 hours have been reviewed.    Significant Imaging: None    Assessment/Plan:     * S/P total knee arthroplasty, left    Post-op delirium - being managed by hospitalist  Cont with OOB activity per nursing and PT  Unable to consider DC planning until MS improves              ALEX NOLAN  Orthopedics  [facility]

## 2017-11-23 NOTE — SUBJECTIVE & OBJECTIVE
"Principal Problem:S/P total knee arthroplasty, left    Principal Orthopedic Problem: S/P L TKA    Interval History: Delirium post op    Review of patient's allergies indicates:   Allergen Reactions    Pcn [penicillins] Rash    Tylenol [acetaminophen] Other (See Comments)     headaches       Current Facility-Administered Medications   Medication    acetaminophen tablet 650 mg    aspirin tablet 325 mg    bisacodyl suppository 10 mg    dextrose 5 % and 0.45 % NaCl infusion    famotidine tablet 20 mg    hydrocodone-acetaminophen 5-325mg per tablet 1 tablet    losartan tablet 100 mg    mupirocin 2 % ointment 1 g    ondansetron disintegrating tablet 4 mg    ondansetron injection 4 mg    promethazine (PHENERGAN) 6.25 mg in dextrose 5 % 50 mL IVPB    zolpidem tablet 5 mg     Objective:     Vital Signs (Most Recent):  Temp: 97.7 °F (36.5 °C) (11/23/17 0808)  Pulse: 94 (11/23/17 0808)  Resp: 20 (11/23/17 0808)  BP: (!) 173/77 (11/23/17 0808)  SpO2: 98 % (11/23/17 0808) Vital Signs (24h Range):  Temp:  [97.7 °F (36.5 °C)-100.8 °F (38.2 °C)] 97.7 °F (36.5 °C)  Pulse:  [] 94  Resp:  [16-20] 20  SpO2:  [94 %-98 %] 98 %  BP: (135-186)/(62-86) 173/77     Weight: 54.4 kg (120 lb)  Height: 5' 1.5" (156.2 cm)  Body mass index is 22.31 kg/m².      Intake/Output Summary (Last 24 hours) at 11/23/17 1010  Last data filed at 11/23/17 0700   Gross per 24 hour   Intake             1030 ml   Output                0 ml   Net             1030 ml       General    Vitals reviewed.  Constitutional: She appears well-developed and well-nourished.   Pulmonary/Chest: Effort normal.   Abdominal: Soft. Bowel sounds are normal.   Neurological: She is alert.   Disoriented to everything         Back (L-Spine & T-Spine) / Neck (C-Spine) Exam     Comments:  LLE DNVI. Dressings clean and dry        Significant Labs:   CBC:   Recent Labs  Lab 11/22/17  0527 11/23/17  0627   WBC 5.70 8.40   HGB 11.2* 11.6*   HCT 33.1* 33.7*    243 "     CMP:   Recent Labs  Lab 11/22/17  0527 11/23/17  0627    140   K 3.8 3.8    104   CO2 26 25   * 103   BUN 8 8   CREATININE 0.7 0.7   CALCIUM 8.3* 9.3   ANIONGAP 7* 11   EGFRNONAA >60 >60     All pertinent labs within the past 24 hours have been reviewed.    Significant Imaging: None

## 2017-11-23 NOTE — ASSESSMENT & PLAN NOTE
Post-op delirium - being managed by hospitalist  Cont with OOB activity per nursing and PT  Unable to consider DC planning until MS improves

## 2017-11-24 VITALS
OXYGEN SATURATION: 98 % | TEMPERATURE: 98 F | BODY MASS INDEX: 22.08 KG/M2 | DIASTOLIC BLOOD PRESSURE: 68 MMHG | SYSTOLIC BLOOD PRESSURE: 148 MMHG | HEIGHT: 62 IN | RESPIRATION RATE: 18 BRPM | HEART RATE: 94 BPM | WEIGHT: 120 LBS

## 2017-11-24 LAB
ANION GAP SERPL CALC-SCNC: 9 MMOL/L
BASOPHILS # BLD AUTO: 0 K/UL
BASOPHILS NFR BLD: 0.4 %
BUN SERPL-MCNC: 10 MG/DL
CALCIUM SERPL-MCNC: 9.1 MG/DL
CHLORIDE SERPL-SCNC: 105 MMOL/L
CO2 SERPL-SCNC: 26 MMOL/L
CREAT SERPL-MCNC: 0.7 MG/DL
DIFFERENTIAL METHOD: ABNORMAL
EOSINOPHIL # BLD AUTO: 0.3 K/UL
EOSINOPHIL NFR BLD: 4.1 %
ERYTHROCYTE [DISTWIDTH] IN BLOOD BY AUTOMATED COUNT: 13.2 %
EST. GFR  (AFRICAN AMERICAN): >60 ML/MIN/1.73 M^2
EST. GFR  (NON AFRICAN AMERICAN): >60 ML/MIN/1.73 M^2
GLUCOSE SERPL-MCNC: 111 MG/DL
HCT VFR BLD AUTO: 32.5 %
HGB BLD-MCNC: 11.2 G/DL
LYMPHOCYTES # BLD AUTO: 1.7 K/UL
LYMPHOCYTES NFR BLD: 21 %
MCH RBC QN AUTO: 32.5 PG
MCHC RBC AUTO-ENTMCNC: 34.3 G/DL
MCV RBC AUTO: 95 FL
MONOCYTES # BLD AUTO: 1 K/UL
MONOCYTES NFR BLD: 11.7 %
NEUTROPHILS # BLD AUTO: 5.2 K/UL
NEUTROPHILS NFR BLD: 62.8 %
PLATELET # BLD AUTO: 277 K/UL
PMV BLD AUTO: 7.3 FL
POTASSIUM SERPL-SCNC: 3.6 MMOL/L
RBC # BLD AUTO: 3.44 M/UL
SODIUM SERPL-SCNC: 140 MMOL/L
WBC # BLD AUTO: 8.3 K/UL

## 2017-11-24 PROCEDURE — 25000003 PHARM REV CODE 250: Performed by: ORTHOPAEDIC SURGERY

## 2017-11-24 PROCEDURE — 25000003 PHARM REV CODE 250: Performed by: INTERNAL MEDICINE

## 2017-11-24 PROCEDURE — 97116 GAIT TRAINING THERAPY: CPT

## 2017-11-24 PROCEDURE — 97110 THERAPEUTIC EXERCISES: CPT

## 2017-11-24 PROCEDURE — 99239 HOSP IP/OBS DSCHRG MGMT >30: CPT | Mod: ,,, | Performed by: INTERNAL MEDICINE

## 2017-11-24 PROCEDURE — 80048 BASIC METABOLIC PNL TOTAL CA: CPT

## 2017-11-24 PROCEDURE — 85025 COMPLETE CBC W/AUTO DIFF WBC: CPT

## 2017-11-24 PROCEDURE — 94799 UNLISTED PULMONARY SVC/PX: CPT

## 2017-11-24 PROCEDURE — 94761 N-INVAS EAR/PLS OXIMETRY MLT: CPT

## 2017-11-24 PROCEDURE — 97530 THERAPEUTIC ACTIVITIES: CPT

## 2017-11-24 PROCEDURE — 36415 COLL VENOUS BLD VENIPUNCTURE: CPT

## 2017-11-24 RX ORDER — ASPIRIN 325 MG
325 TABLET ORAL 2 TIMES DAILY
Refills: 0
Start: 2017-11-24 | End: 2017-12-24

## 2017-11-24 RX ORDER — HYDROCODONE BITARTRATE AND ACETAMINOPHEN 5; 325 MG/1; MG/1
1 TABLET ORAL EVERY 4 HOURS PRN
Refills: 0
Start: 2017-11-24 | End: 2018-06-04

## 2017-11-24 RX ADMIN — LOSARTAN POTASSIUM 100 MG: 25 TABLET, FILM COATED ORAL at 08:11

## 2017-11-24 RX ADMIN — FAMOTIDINE 20 MG: 20 TABLET, FILM COATED ORAL at 08:11

## 2017-11-24 RX ADMIN — ASPIRIN 325 MG ORAL TABLET 325 MG: 325 PILL ORAL at 08:11

## 2017-11-24 RX ADMIN — MUPIROCIN 1 G: 20 OINTMENT TOPICAL at 08:11

## 2017-11-24 RX ADMIN — HYDROCODONE BITARTRATE AND ACETAMINOPHEN 1 TABLET: 5; 325 TABLET ORAL at 05:11

## 2017-11-24 NOTE — PLAN OF CARE
Problem: Patient Care Overview  Goal: Plan of Care Review  Outcome: Ongoing (interventions implemented as appropriate)  Patient oriented to self only. Reoriented patient to time, situation, and place. Patient confused through out the night. Sitter at the bedside. AVAsys in room. Patient POD 3. Bandage to the left knee; CDI. Neurovascular checks every 4 hours; neurovascular intact. Patient refuses to wear scds and foot pump. Patient refuses cryotherapy. Patient reports pain treated with medication ordered by Md. IV fluids stopped due to patient pulling at the lines. Patient up to the  Bathroom with assistance. Hourly rounding on patient to promote safety.  Safety maintained throughout the shift.  Patient positions and repositions self independently. No Skin break down.   Bed locked and in lowest position. Call light in reach. Side rails up x2. NON skid socks on when OOB. Patient remained free of falls/ trauma.  Will continue to monitor.

## 2017-11-24 NOTE — PROGRESS NOTES
"[facility]  Orthopedics  Progress Note    Patient Name: Shalini Donald  MRN: [id number]  Admission Date: 11/21/2017  Hospital Length of Stay: 3 days  Attending Provider: Joshua Mccabe MD  Primary Care Provider: Kali Mccullough MD  Follow-up For: Procedure(s) (LRB):  ARTHROPLASTY-KNEE (Left)    Post-Operative Day: 3 Days Post-Op  Subjective:     Principal Problem:S/P total knee arthroplasty, left    Principal Orthopedic Problem: S/P L TKA    Interval History: Delerium with [facility]     Review of patient's allergies indicates:   Allergen Reactions    Pcn [penicillins] Rash    Tylenol [acetaminophen] Other (See Comments)     headaches       Current Facility-Administered Medications   Medication    acetaminophen tablet 650 mg    aspirin tablet 325 mg    bisacodyl suppository 10 mg    dextrose 5 % and 0.45 % NaCl infusion    famotidine tablet 20 mg    hydrocodone-acetaminophen 5-325mg per tablet 1 tablet    losartan tablet 100 mg    mupirocin 2 % ointment 1 g    ondansetron disintegrating tablet 4 mg    ondansetron injection 4 mg    promethazine (PHENERGAN) 6.25 mg in dextrose 5 % 50 mL IVPB    zolpidem tablet 5 mg     Objective:     Vital Signs (Most Recent):  Temp: 97.9 °F (36.6 °C) (11/24/17 0456)  Pulse: 96 (11/24/17 0456)  Resp: 14 (11/24/17 0456)  BP: 139/72 (11/24/17 0456)  SpO2: 95 % (11/24/17 0456) Vital Signs (24h Range):  Temp:  [97.7 °F (36.5 °C)-98.5 °F (36.9 °C)] 97.9 °F (36.6 °C)  Pulse:  [93-96] 96  Resp:  [14-20] 14  SpO2:  [95 %-98 %] 95 %  BP: (139-173)/(72-80) 139/72     Weight: 54.4 kg (120 lb)  Height: 5' 1.5" (156.2 cm)  Body mass index is 22.31 kg/m².      Intake/Output Summary (Last 24 hours) at 11/24/17 0721  Last data filed at 11/24/17 0600   Gross per 24 hour   Intake              480 ml   Output                0 ml   Net              480 ml       General    Vitals reviewed.  Constitutional: She appears well-developed and well-nourished.   Abdominal: Soft. Bowel " sounds are normal.   Neurological: She is alert.             Left Knee Exam     Comments:  LLE DNVI. Incision clean and dry      Significant Labs:   CBC:   Recent Labs  Lab 11/23/17 0627 11/24/17  0540   WBC 8.40 8.30   HGB 11.6* 11.2*   HCT 33.7* 32.5*    277     CMP:   Recent Labs  Lab 11/23/17 0627 11/24/17  0540    140   K 3.8 3.6    105   CO2 25 26    111*   BUN 8 10   CREATININE 0.7 0.7   CALCIUM 9.3 9.1   ANIONGAP 11 9   EGFRNONAA >60 >60     All pertinent labs within the past 24 hours have been reviewed.    Significant Imaging: None    Assessment/Plan:     * S/P total knee arthroplasty, left    Post-op delirium - being managed by hospitalist  Cont with OOB activity per nursing and PT  DC planning               ALEX NOLAN  Orthopedics  [facility]

## 2017-11-24 NOTE — PT/OT/SLP PROGRESS
"Physical Therapy  Treatment    Shalini Donald   MRN: [id number]   Admitting Diagnosis: S/P total knee arthroplasty, left    PT Received On: 11/24/17  PT Start Time: 1011     PT Stop Time: 1038    PT Total Time (min): 27 min       Billable Minutes:  Gait Training 15 and Therapeutic Activity 12    Treatment Type: Treatment  PT/PTA: PT       General Precautions: Standard, fall  Orthopedic Precautions: LLE weight bearing as tolerated     Do you have any cultural, spiritual, Mosque conflicts, given your current situation?: None    Subjective:  Communicated with RN prior to session.  Pt reported "I want to wash my face."    Pain/Comfort  Pain Rating 1: 0/10    Objective:   Patient found with:  (no lines)    Functional Mobility:  Bed Mobility:   Scooting/Bridging: Stand by Assistance  Supine to Sit: Stand by Assistance    Transfers:  Sit <> Stand Assistance: Contact Guard Assistance  Sit <> Stand Assistive Device: Rolling Walker    Gait:   Gait Distance: 70 feet  Assistance 1: Contact Guard Assistance  Gait Assistive Device: Rolling walker  Gait Pattern: 3-point gait  Gait Deviation(s): decreased merrill, increased time in double stance, decreased velocity of limb motion, decreased step length, decreased toe-to-floor clearance, decreased weight-shifting ability, forward lean  Cues for RW placement (maintain walker near NATHANIEL) and upright posture. Pt's walker noted to be too high and with wheels in the back so it was adjusted.     Balance:   Static Sit: FAIR+: Able to take MINIMAL challenges from all directions  Dynamic Sit: FAIR+: Maintains balance through MINIMAL excursions of active trunk motion  Static Stand: FAIR: Maintains without assist but unable to take challenges  Dynamic stand: FAIR: Needs CONTACT GUARD during gait     Therapeutic Activities and Exercises:  Pt stood at the sink x 5 minutes with 1-2 UE support on counter to wash her face. CGA provided for stability. Pt required cues for proper use of walker and not " to turn away form counter without using the walker to support herself.     AM-PAC 6 CLICK MOBILITY  How much help from another person does this patient currently need?   1 = Unable, Total/Dependent Assistance  2 = A lot, Maximum/Moderate Assistance  3 = A little, Minimum/Contact Guard/Supervision  4 = None, Modified [place]/Independent    Turning over in bed (including adjusting bedclothes, sheets and blankets)?: 3  Sitting down on and standing up from a chair with arms (e.g., wheelchair, bedside commode, etc.): 3  Moving from lying on back to sitting on the side of the bed?: 3  Moving to and from a bed to a chair (including a wheelchair)?: 3  Need to walk in hospital room?: 3  Climbing 3-5 steps with a railing?: 2  Total Score: 17    AM-PAC Raw Score CMS G-Code Modifier Level of Impairment Assistance   6 % Total / Unable   7 - 9 CM 80 - 100% Maximal Assist   10 - 14 CL 60 - 80% Moderate Assist   15 - 19 CK 40 - 60% Moderate Assist   20 - 22 CJ 20 - 40% Minimal Assist   23 CI 1-20% SBA / CGA   24 CH 0% Independent/ Mod I     Patient left up in chair with all lines intact, call button in reach and sitters present.    Assessment:  Shalini Donald is a 83 y.o. female with a medical diagnosis of S/P total knee arthroplasty, left and presents with slight improvement in cognition but continues to have decreased safety awareness and post op weakness which limits independence with all mobility. She would benefit from continued PT to progress mobility.    Rehab identified problem list/impairments: Rehab identified problem list/impairments: weakness, impaired endurance, gait instability, impaired functional mobilty, impaired balance, impaired self care skills, decreased lower extremity function, decreased ROM, impaired joint extensibility, decreased safety awareness, impaired cognition, orthopedic precautions    Rehab potential is good.    Activity tolerance: Fair    Discharge recommendations: Discharge  Facility/Level Of Care Needs: home health PT with / family supervision    Barriers to discharge: Barriers to Discharge: Inaccessible home environment    Equipment recommendations: Equipment Needed After Discharge: walker, rolling     GOALS:    Physical Therapy Goals        Problem: Physical Therapy Goal    Goal Priority Disciplines Outcome Goal Variances Interventions   Physical Therapy Goal     PT/OT, PT Ongoing (interventions implemented as appropriate)     Description:  Goals to be met by: 2017     Patient will increase functional independence with mobility by performin. Supine to sit with Stand-by Assistance  2. Sit to supine with Stand-by Assistance  3. Sit to stand transfer with Stand-by Assistance  4. Bed to chair transfer with Stand-by Assistance using Rolling Walker  5. Gait  x 150 feet with Stand-by Assistance using Rolling Walker.   6. Ascend/descend 3 stairs with bilateral Handrails Contact Guard Assistance using Rolling Walker.   7. Lower extremity exercise program x10 reps per handout, with supervision                      PLAN:    Patient to be seen BID  to address the above listed problems via therapeutic exercises, therapeutic activities, gait training  Plan of Care expires: 17  Plan of Care reviewed with: patient     Jessica LeJeune, PT, DPT

## 2017-11-24 NOTE — PLAN OF CARE
Problem: Physical Therapy Goal  Goal: Physical Therapy Goal  Goals to be met by: 2017     Patient will increase functional independence with mobility by performin. Supine to sit with Stand-by Assistance  2. Sit to supine with Stand-by Assistance  3. Sit to stand transfer with Stand-by Assistance  4. Bed to chair transfer with Stand-by Assistance using Rolling Walker  5. Gait  x 150 feet with Stand-by Assistance using Rolling Walker.   6. Ascend/descend 3 stairs with bilateral Handrails Contact Guard Assistance using Rolling Walker.   7. Lower extremity exercise program x10 reps per handout, with supervision     Outcome: Ongoing (interventions implemented as appropriate)  Pt is progressing toward goals. Pt oriented to person and place today. She continues to have decreased safety awareness.

## 2017-11-24 NOTE — PLAN OF CARE
Discharge instructions explained to patient and patient's Son. IV removed. No questions or concerns. Patient escorted down and had personal walker and Cryo machine with them.

## 2017-11-24 NOTE — PLAN OF CARE
DME rep here to deliver patient's walker. Patient refusing walker because she already has one. DME rep Jacob Thakur left information to call him back if needed.

## 2017-11-24 NOTE — PLAN OF CARE
SELENA sent home health referral to [facility] via [facility] for processing, awaiting acceptance SELENA Smith    Sent dme orders for rolling walker to DME- [employer]. Fax sent to [phone], confirmation received. SELENA Smith    8290-The referral for the patient in [facility], room 422, bed 422 A admitted at 11/21/2017 8:41 AM has been updated by [email or web address].  Update: Accepted by [facility] [place]

## 2017-11-24 NOTE — PLAN OF CARE
Problem: Patient Care Overview  Goal: Plan of Care Review  Outcome: Ongoing (interventions implemented as appropriate)  Pt on room air with 98% sats and IS. Pt achieves 1000 on IS

## 2017-11-24 NOTE — PLAN OF CARE
Spoke to Vicente Grace NP. He stated ok for patient to Dc home. Cleared for discharge as far as Ortho is concerned.

## 2017-11-24 NOTE — PT/OT/SLP PROGRESS
"Physical Therapy  Treatment    Shalini Donald   MRN: [id number]   Admitting Diagnosis: Knee joint replacement status, left    PT Received On: 11/24/17  PT Start Time: 1305     PT Stop Time: 1329    PT Total Time (min): 24 min       Billable Minutes:  Gait Training 15 and Therapeutic Exercise 9    Treatment Type: Treatment  PT/PTA: PT       General Precautions: Standard, fall  Orthopedic Precautions: LLE weight bearing as tolerated     Do you have any cultural, spiritual, Shinto conflicts, given your current situation?: None    Subjective:  Communicated with RN prior to session.  Pt reported "I am going home after therapy today."    Pain/Comfort  Pain Rating 1: 3/10  Location - Side 1: Left  Location - Orientation 1: generalized  Location 1: knee  Pain Addressed 1: Reposition, Distraction  Pain Rating Post-Intervention 1: 0/10    Objective:   Patient found with:  (no lines)    Functional Mobility:  Bed Mobility:   Scooting/Bridging: Stand by Assistance  Supine to Sit: Stand by Assistance    Transfers:  Sit <> Stand Assistance: Contact Guard Assistance  Sit <> Stand Assistive Device: Rolling Walker   Pt attempted to sit quickly without being close to chair. PT reviewed safely approaching chair and reaching back for armrests.     Gait:   Gait Distance: 200 feet  Assistance 1: Contact Guard Assistance  Gait Assistive Device: Rolling walker  Gait Pattern: 3-point gait (progressing to swing through gait with cueing)  Gait Deviation(s): decreased merrill, increased time in double stance, decreased velocity of limb motion, decreased step length, decreased toe-to-floor clearance, decreased weight-shifting ability  Cues for upright posture and swing through gait pattern.     Balance:   Static Sit: FAIR+: Able to take MINIMAL challenges from all directions  Dynamic Sit: FAIR+: Maintains balance through MINIMAL excursions of active trunk motion  Static Stand: FAIR+: Takes MINIMAL challenges from all directions  Dynamic stand: " FAIR: Needs CONTACT GUARD during gait     Therapeutic Activities and Exercises:  Seated marching, LAQ (AAROM LLE), and ankle pumps x 10 each LE.      AM-PAC 6 CLICK MOBILITY  How much help from another person does this patient currently need?   1 = Unable, Total/Dependent Assistance  2 = A lot, Maximum/Moderate Assistance  3 = A little, Minimum/Contact Guard/Supervision  4 = None, Modified [place]/Independent    Turning over in bed (including adjusting bedclothes, sheets and blankets)?: 3  Sitting down on and standing up from a chair with arms (e.g., wheelchair, bedside commode, etc.): 3  Moving from lying on back to sitting on the side of the bed?: 3  Moving to and from a bed to a chair (including a wheelchair)?: 3  Need to walk in hospital room?: 3  Climbing 3-5 steps with a railing?: 2  Total Score: 17    AM-PAC Raw Score CMS G-Code Modifier Level of Impairment Assistance   6 % Total / Unable   7 - 9 CM 80 - 100% Maximal Assist   10 - 14 CL 60 - 80% Moderate Assist   15 - 19 CK 40 - 60% Moderate Assist   20 - 22 CJ 20 - 40% Minimal Assist   23 CI 1-20% SBA / CGA   24 CH 0% Independent/ Mod I     Patient left up in chair with all lines intact and call button in reach.    Assessment:  Shalini Donald is a 83 y.o. female with a medical diagnosis of Knee joint replacement status, left and presents with improved gait pattern and participation this afternoon. She would benefit from continued PT to progress mobility.    Rehab identified problem list/impairments: Rehab identified problem list/impairments: weakness, impaired endurance, gait instability, impaired functional mobilty, impaired balance, impaired self care skills, decreased lower extremity function, decreased safety awareness, impaired cognition, impaired coordination, impaired joint extensibility, decreased ROM, orthopedic precautions    Rehab potential is good.    Activity tolerance: Fair    Discharge recommendations: Discharge Facility/Level Of  Care Needs: home health PT     Barriers to discharge: Barriers to Discharge: Inaccessible home environment    Equipment recommendations: Equipment Needed After Discharge: none     GOALS:    Physical Therapy Goals     Not on file          Multidisciplinary Problems (Resolved)        Problem: Physical Therapy Goal    Goal Priority Disciplines Outcome Goal Variances Interventions   Physical Therapy Goal   (Resolved)     PT/OT, PT Outcome(s) achieved     Description:  Goals to be met by: 2017     Patient will increase functional independence with mobility by performin. Supine to sit with Stand-by Assistance  2. Sit to supine with Stand-by Assistance  3. Sit to stand transfer with Stand-by Assistance  4. Bed to chair transfer with Stand-by Assistance using Rolling Walker  5. Gait  x 150 feet with Stand-by Assistance using Rolling Walker.   6. Ascend/descend 3 stairs with bilateral Handrails Contact Guard Assistance using Rolling Walker.   7. Lower extremity exercise program x10 reps per handout, with supervision                      PLAN:    Patient to be seen BID  to address the above listed problems via therapeutic exercises, therapeutic activities, gait training  Plan of Care expires: 17  Plan of Care reviewed with: patient     Jessica LeJeune, PT, DPT

## 2017-11-24 NOTE — PLAN OF CARE
11/24/17 1606   Final Note   Assessment Type Final Discharge Note   Discharge Disposition Home-Health   Discharge plans and expectations educations in teach back method with documentation complete? Yes

## 2017-11-24 NOTE — SUBJECTIVE & OBJECTIVE
"Principal Problem:S/P total knee arthroplasty, left    Principal Orthopedic Problem: S/P L TKA    Interval History: Delerium with PMH     Review of patient's allergies indicates:   Allergen Reactions    Pcn [penicillins] Rash    Tylenol [acetaminophen] Other (See Comments)     headaches       Current Facility-Administered Medications   Medication    acetaminophen tablet 650 mg    aspirin tablet 325 mg    bisacodyl suppository 10 mg    dextrose 5 % and 0.45 % NaCl infusion    famotidine tablet 20 mg    hydrocodone-acetaminophen 5-325mg per tablet 1 tablet    losartan tablet 100 mg    mupirocin 2 % ointment 1 g    ondansetron disintegrating tablet 4 mg    ondansetron injection 4 mg    promethazine (PHENERGAN) 6.25 mg in dextrose 5 % 50 mL IVPB    zolpidem tablet 5 mg     Objective:     Vital Signs (Most Recent):  Temp: 97.9 °F (36.6 °C) (11/24/17 0456)  Pulse: 96 (11/24/17 0456)  Resp: 14 (11/24/17 0456)  BP: 139/72 (11/24/17 0456)  SpO2: 95 % (11/24/17 0456) Vital Signs (24h Range):  Temp:  [97.7 °F (36.5 °C)-98.5 °F (36.9 °C)] 97.9 °F (36.6 °C)  Pulse:  [93-96] 96  Resp:  [14-20] 14  SpO2:  [95 %-98 %] 95 %  BP: (139-173)/(72-80) 139/72     Weight: 54.4 kg (120 lb)  Height: 5' 1.5" (156.2 cm)  Body mass index is 22.31 kg/m².      Intake/Output Summary (Last 24 hours) at 11/24/17 0721  Last data filed at 11/24/17 0600   Gross per 24 hour   Intake              480 ml   Output                0 ml   Net              480 ml       General    Vitals reviewed.  Constitutional: She appears well-developed and well-nourished.   Abdominal: Soft. Bowel sounds are normal.   Neurological: She is alert.             Left Knee Exam     Comments:  LLE DNVI. Incision clean and dry      Significant Labs:   CBC:   Recent Labs  Lab 11/23/17  0627 11/24/17  0540   WBC 8.40 8.30   HGB 11.6* 11.2*   HCT 33.7* 32.5*    277     CMP:   Recent Labs  Lab 11/23/17  0627 11/24/17  0540    140   K 3.8 3.6    105 "   CO2 25 26    111*   BUN 8 10   CREATININE 0.7 0.7   CALCIUM 9.3 9.1   ANIONGAP 11 9   EGFRNONAA >60 >60     All pertinent labs within the past 24 hours have been reviewed.    Significant Imaging: None

## 2017-11-24 NOTE — ASSESSMENT & PLAN NOTE
Post-op delirium - being managed by hospitalist  Cont with OOB activity per nursing and PT  DC planning

## 2017-11-24 NOTE — DISCHARGE SUMMARY
Discharge Summary  Hospital Medicine    Admit Date: 11/21/2017    Date and Time: 11/24/201712:16 PM    Discharge Attending Physician: Joshua Mccabe MD    Primary Care Physician: Kali Mccullough MD    Diagnoses:  Active Hospital Problems    Diagnosis  POA    *Knee joint replacement status, left [Z96.652]  Not Applicable    Arthritis [M19.90]  Yes    Hypertension [I10]  Yes      Resolved Hospital Problems    Diagnosis Date Resolved POA   No resolved problems to display.     Discharged Condition: Good    Hospital Course:   Patient is a 83 y.o. female admitted to Hospitalist Service from Operation Room s/p left total knee arthroplasty performed by Dr. Aquino. Patient reportedly has past medical history significant for OA, chronic lower back pain and hypertension. Patient is evaluate din recovery room. Patient is still sedated due to anesthetic agent. Not able to provide further details. No acute distress noted. Patient was admitted to Hospitalist medicine service. Patient was evaluated by Dr. Aquino. Post-operative, patient did well. Pain adequately controlled. Patient participated with physical therapy. Home health and home physical therapy has been arranged. Fall precautions discussed with the patient. Patient to follow up with primary care physician next week and orthopedic doctor in 2 weeks. Post-operative anti-coagulation as per orthopedics recommendations advised. Patient was closely observed for post-op delirium which is much better now. In case of chest pain, shortness of breath, stroke or stroke like symptoms, high grade fever or any signs or symptoms of surgical site wound infection symptoms, patient to return to nearest emergency room as soon as possible. Patient was discharged home in stable condition with following discharge plan of care. Total time with the patient was 30 minutes and greater than 50% was spent in counseling and coordination of care. The assessment and plan have been discussed at length.  "Physicians' notes reviewed. Labs and procedure reviewed.     Consults: Dr. Aquino    Significant Diagnostic Studies:   Left knee x-ray: Intraoperative left knee radiograph demonstrates good positioning of arthroplasty components.     CXR:  No acute process. No significant change    Microbiology Results (last 7 days)     ** No results found for the last 168 hours. **        Special Treatments/Procedures: as above  Disposition: Home or Self Care    Medications:  Reconciled Home Medications:   Current Discharge Medication List      START taking these medications    Details   hydrocodone-acetaminophen 5-325mg (NORCO) 5-325 mg per tablet Take 1 tablet by mouth every 4 (four) hours as needed.  Refills: 0         CONTINUE these medications which have CHANGED    Details   aspirin 325 MG tablet Take 1 tablet (325 mg total) by mouth 2 (two) times daily.  Refills: 0         CONTINUE these medications which have NOT CHANGED    Details   losartan (COZAAR) 100 MG tablet Take 100 mg by mouth once daily.             Discharge Procedure Orders  WALKER FOR HOME USE   Order Specific Question Answer Comments   Type of Walker: Adult (5'4"-6'6")    With wheels? Yes    Height: 5' 1.5" (1.562 m)    Weight: 54.4 kg (120 lb)    Length of need (1-99 months): 99    Does patient have medical equipment at home? rollator    Does patient have medical equipment at home? bedside commode    Please check all that apply: Patient is unable to safely ambulate without equipment.      Ambulatory referral to Home Health   Referral Priority: Routine Referral Type: Home Health   Referral Reason: Specialty Services Required    Requested Specialty: Home Health Services    Number of Visits Requested: 1      Diet general   Order Comments: Cardiac/ 2 gram sodium low cholesterol diet     Other restrictions (specify):   Order Comments: Fall precautions     Call MD for:   Order Comments: For worsening symptoms, chest pain, shortness of breath, increased abdominal " pain, high grade fever, stroke or stroke like symptoms, immediately go to the nearest Emergency Room or call 911 as soon as possible.       Follow-up Information     Nick Aquino Jr, MD On 12/6/2017.    Specialty:  Orthopedic Surgery  Why:  @1:00pm   Contact information:  [address]  [address]  [facility] [address]  [phone]             BRUNILDA  ANDERSON [facility].    Specialty:  DME Provider  Why:  DME  Contact information:  [address] [place] [address]  [phone]             [facility].    Specialty:  Home Health Services  Why:  Home Health  Contact information:  [address] [place] [address]  [phone]           Kali Mccullough MD In 1 week.    Specialty:  Internal Medicine  Contact information:  [address] [place] [address] [address]  [place] [address] [phone]

## 2017-11-28 ENCOUNTER — PATIENT OUTREACH (OUTPATIENT)
Dept: ADMINISTRATIVE | Facility: CLINIC | Age: 82
End: 2017-11-28

## 2018-05-17 NOTE — PLAN OF CARE
Problem: Patient Care Overview  Goal: Plan of Care Review  Outcome: Ongoing (interventions implemented as appropriate)  Pt remained injury free this shift with fall precautions in place as well as one on one monitoring with staff that has turned into a sitter at bedside for continuous monitoring.  No fever noted, she has been up with PT two times today and up several times to void per bsc, she is anxious and agitated with confusion noted, she is oriented to self and birthday but thinks she is at home, is combative at times, restless, argumentative. Forgetful and has to be reoriented frequently.  Educating her on use of incentive is difficult, she has plexi pulses on both feet, neuro checks are good, she denies numbness or tingling, pulses good, no discoloration noted.  Dressing is in clean and dry, still surgical dressing noted.  She has bed alarm on as well as sitters and at this time her son is at bedside but he does not stay with her.  She has been medicated for pain as requested. Encouraged to call for assistance unable to determine if she understands instructions, she does call repeatedly for the same thing multiple times so her orientation is diminished this is unchanged since my shift assessment as well as night shifts assessment.        TRANSFER - OUT REPORT:    Verbal report given to Miriangopal Oh (name) on Geovanny Mantle  being transferred to  (unit) for routine progression of care       Report consisted of patients Situation, Background, Assessment and   Recommendations(SBAR). Information from the following report(s) ED Summary was reviewed with the receiving nurse. Opportunity for questions and clarification was provided.       Patient transported with:   Registered Nurse

## 2018-06-04 ENCOUNTER — OFFICE VISIT (OUTPATIENT)
Dept: ORTHOPEDICS | Facility: CLINIC | Age: 83
End: 2018-06-04
Payer: MEDICARE

## 2018-06-04 VITALS
HEIGHT: 62 IN | BODY MASS INDEX: 20.8 KG/M2 | WEIGHT: 113 LBS | SYSTOLIC BLOOD PRESSURE: 161 MMHG | DIASTOLIC BLOOD PRESSURE: 75 MMHG | HEART RATE: 65 BPM

## 2018-06-04 DIAGNOSIS — T84.54XA INFECTION OF TOTAL LEFT KNEE REPLACEMENT, INITIAL ENCOUNTER: ICD-10-CM

## 2018-06-04 DIAGNOSIS — T84.84XA PAIN DUE TO TOTAL LEFT KNEE REPLACEMENT, INITIAL ENCOUNTER: ICD-10-CM

## 2018-06-04 DIAGNOSIS — Z96.652 KNEE JOINT REPLACEMENT STATUS, LEFT: Primary | ICD-10-CM

## 2018-06-04 DIAGNOSIS — Z96.652 PAIN DUE TO TOTAL LEFT KNEE REPLACEMENT, INITIAL ENCOUNTER: ICD-10-CM

## 2018-06-04 PROCEDURE — 73562 X-RAY EXAM OF KNEE 3: CPT | Mod: LT,,, | Performed by: ORTHOPAEDIC SURGERY

## 2018-06-04 PROCEDURE — 99214 OFFICE O/P EST MOD 30 MIN: CPT | Mod: 25,,, | Performed by: ORTHOPAEDIC SURGERY

## 2018-06-04 RX ORDER — CLONIDINE HYDROCHLORIDE 0.1 MG/1
TABLET ORAL
Refills: 0 | Status: ON HOLD | COMMUNITY
Start: 2018-05-08 | End: 2023-10-11 | Stop reason: HOSPADM

## 2018-06-04 RX ORDER — ASPIRIN 325 MG
325 TABLET ORAL DAILY
Status: ON HOLD | COMMUNITY
End: 2023-10-11 | Stop reason: HOSPADM

## 2018-06-04 NOTE — PROGRESS NOTES
[facility]    Subjective:     Chief Complaint:   Chief Complaint   Patient presents with    Left Knee - Pain     Left knee pain x 2-3 weeks. States that the pain came out of no where. States that the pain is to the outside of her knee. States that the pain is constant.        Past Medical History:   Diagnosis Date    Arthritis     Back pain     Hypertension     Wears glasses     Wears partial dentures     upper       Past Surgical History:   Procedure Laterality Date    CHOLECYSTECTOMY      EYE SURGERY Bilateral     cataracts    JOINT REPLACEMENT Right     knee    KNEE SURGERY         Current Outpatient Prescriptions   Medication Sig    aspirin (MIKEY ASPIRIN) 325 MG tablet Mikey Aspirin 325 mg tablet   Take 1 tablet every day by oral route.    cloNIDine (CATAPRES) 0.1 MG tablet     losartan (COZAAR) 100 MG tablet Take 100 mg by mouth once daily.     No current facility-administered medications for this visit.        Review of patient's allergies indicates:   Allergen Reactions    Pcn [penicillins] Rash    Tylenol [acetaminophen] Other (See Comments)     headaches       No family history on file.    Social History     Social History    Marital status:      Spouse name: N/A    Number of children: N/A    Years of education: N/A     Occupational History    Not on file.     Social History Main Topics    Smoking status: Never Smoker    Smokeless tobacco: Never Used    Alcohol use No    Drug use: No    Sexual activity: Not on file     Other Topics Concern    Not on file     Social History Narrative    No narrative on file       History of present illness: Follow-up will left total knee she now about 6 months she still has some pain in the knee some swelling and occasional sharp pain. No drainage no fever. She has a right total knee which is doing beautifully left one is not doing as well as right one and she does not have is good motion on the left knee as the right no  injury      Review of Systems:    Constitution: Negative for chills, fever, and sweats.  Negative for unexplained weight loss.    HENT:  Negative for headaches and blurry vision.    Cardiovascular:Negative for chest pain or irregular heart beat. Negative for hypertension.    Respiratory:  Negative for cough and shortness of breath.    Gastrointestinal: Negative for abdominal pain, heartburn, melena, nausea, and vomitting.    Genitourinary:  Negative bladder incontinence and dysuria.    Musculoskeletal:  See HPI for details.     Neurological: Negative for numbness.    Psychiatric/Behavioral: Negative for depression.  The patient is not nervous/anxious.      Endocrine: Negative for polyuria    Hematologic/Lymphatic: Negative for bleeding problem.  Does not bruise/bleed easily.    Skin: Negative for poor would healing and rash    Objective:      Physical Examination:    Vital Signs:    Vitals:    06/04/18 1545   BP: (!) 161/75   Pulse: 65       Body mass index is 21.01 kg/m².    This a well-developed, well nourished patient in no acute distress.  They are alert and oriented and cooperative to examination.        Physical exam left knee shows the wound well healed she does have probably a mild effusion left knee compared to the right. Her flexion is about 100 her extension is to about 5° but the right side moves about 20° more flexion than the left one. I have a significant antalgic gait. No inflammation  Pertinent New Results:    XRAY Report / Interpretation:   AP lateral sunrise left knee looks very good total knee arthroplasty in good position,, no evidence for loosening,, the alignment is very similar to the opposite knee coverage looks good.. there are no calcifications anywhere around the knee.Electronically Signed By Nick Aquino JR, MD    Assessment/Plan:      Impression total knee arthroplasty painful with some swelling left knee plan a moderate order a blood work CBC sedimentation rate CRP and a bone scan  left knee to see if this some mechanical problem with the knee may or may not aspirated. She can walk without an aid but knee is not as good as the opposite knee nor doesn't move as well.      This note was created using Dragon voice recognition software that occasionally misinterpreted phrases or words.

## 2018-06-05 ENCOUNTER — TELEPHONE (OUTPATIENT)
Dept: ORTHOPEDICS | Facility: CLINIC | Age: 83
End: 2018-06-05

## 2018-06-06 DIAGNOSIS — T84.54XA INFECTION OF TOTAL LEFT KNEE REPLACEMENT, INITIAL ENCOUNTER: ICD-10-CM

## 2018-06-06 DIAGNOSIS — Z96.652 PAIN DUE TO TOTAL LEFT KNEE REPLACEMENT, INITIAL ENCOUNTER: Primary | ICD-10-CM

## 2018-06-06 DIAGNOSIS — T84.84XA PAIN DUE TO TOTAL LEFT KNEE REPLACEMENT, INITIAL ENCOUNTER: Primary | ICD-10-CM

## 2018-06-18 ENCOUNTER — OFFICE VISIT (OUTPATIENT)
Dept: ORTHOPEDICS | Facility: CLINIC | Age: 83
End: 2018-06-18
Payer: MEDICARE

## 2018-06-18 VITALS
WEIGHT: 112 LBS | BODY MASS INDEX: 20.61 KG/M2 | DIASTOLIC BLOOD PRESSURE: 89 MMHG | SYSTOLIC BLOOD PRESSURE: 148 MMHG | HEIGHT: 62 IN | HEART RATE: 87 BPM

## 2018-06-18 DIAGNOSIS — T84.84XA PAIN DUE TO TOTAL LEFT KNEE REPLACEMENT, INITIAL ENCOUNTER: Primary | ICD-10-CM

## 2018-06-18 DIAGNOSIS — Z96.652 PAIN DUE TO TOTAL LEFT KNEE REPLACEMENT, INITIAL ENCOUNTER: Primary | ICD-10-CM

## 2018-06-18 PROCEDURE — 99213 OFFICE O/P EST LOW 20 MIN: CPT | Mod: ,,, | Performed by: ORTHOPAEDIC SURGERY

## 2018-06-18 RX ORDER — DOXYCYCLINE 100 MG/1
CAPSULE ORAL
COMMUNITY
Start: 2018-06-14 | End: 2020-09-18

## 2018-06-18 NOTE — PROGRESS NOTES
[facility]    Subjective:     Chief Complaint:   Chief Complaint   Patient presents with    Left Knee - Pain     Left knee pain/follow up with labs. States that he knee has gotten better. States that it is not really hurting right now.        Past Medical History:   Diagnosis Date    Arthritis     Back pain     Hypertension     Wears glasses     Wears partial dentures     upper       Past Surgical History:   Procedure Laterality Date    CHOLECYSTECTOMY      EYE SURGERY Bilateral     cataracts    JOINT REPLACEMENT Right     knee    KNEE SURGERY         Current Outpatient Prescriptions   Medication Sig    aspirin (MIKEY ASPIRIN) 325 MG tablet Mikey Aspirin 325 mg tablet   Take 1 tablet every day by oral route.    cloNIDine (CATAPRES) 0.1 MG tablet     doxycycline (VIBRAMYCIN) 100 MG Cap     losartan (COZAAR) 100 MG tablet Take 100 mg by mouth once daily.     No current facility-administered medications for this visit.        Review of patient's allergies indicates:   Allergen Reactions    Pcn [penicillins] Rash    Tylenol [acetaminophen] Other (See Comments)     headaches       History reviewed. No pertinent family history.    Social History     Social History    Marital status:      Spouse name: N/A    Number of children: N/A    Years of education: N/A     Occupational History    Not on file.     Social History Main Topics    Smoking status: Never Smoker    Smokeless tobacco: Never Used    Alcohol use No    Drug use: No    Sexual activity: Not on file     Other Topics Concern    Not on file     Social History Narrative    No narrative on file       History of present illness: Follow-up will left total knee. She had some swelling and little more aching than average so we did a workup. Her CRP sedimentation rate and CBC are all within normal limits easily. She did not do a CT scanning she was worried about taking some medicine that is not pertinent to her situation but saw  on TV. Meanwhile her left knee is improving some could she's backed off of some of the yard work. He can walk grocery and walk around without any issues. Left knee has good extension scar looks. Full left knee is larger than opposite one but not huge. She has a total knee on the other side. Alignment is good. Flexes to about 100 but has some discomfort at 100°. No antalgic gait      Review of Systems:    Constitution: Negative for chills, fever, and sweats.  Negative for unexplained weight loss.    HENT:  Negative for headaches and blurry vision.    Cardiovascular:Negative for chest pain or irregular heart beat. Negative for hypertension.    Respiratory:  Negative for cough and shortness of breath.    Gastrointestinal: Negative for abdominal pain, heartburn, melena, nausea, and vomitting.    Genitourinary:  Negative bladder incontinence and dysuria.    Musculoskeletal:  See HPI for details.     Neurological: Negative for numbness.    Psychiatric/Behavioral: Negative for depression.  The patient is not nervous/anxious.      Endocrine: Negative for polyuria    Hematologic/Lymphatic: Negative for bleeding problem.  Does not bruise/bleed easily.    Skin: Negative for poor would healing and rash    Objective:      Physical Examination:    Vital Signs:    Vitals:    06/18/18 0943   BP: (!) 148/89   Pulse: 87       Body mass index is 20.82 kg/m².    This a well-developed, well nourished patient in no acute distress.  They are alert and oriented and cooperative to examination.        Hello physical exam shows no antalgic gait good extension no effusion scar looks beautiful knee is a little bigger than the opposite side. When she passes about 90 and gets 200 she gets some discomfort in the left knee. Alignment looks good.  Pertinent New Results:    XRAY Report / Interpretation:       Assessment/Plan:      Impression is more than average postop scarring in left knee. We talked briefly about arthroscopic lysis of adhesions we  agreed not to pursue that at this time. She is reasonably happy with the knee. We did not have any evidence for infection. He can still get CAT scan if needed in the future. She wants to let time past and see if things improve which they may.      This note was created using Dragon voice recognition software that occasionally misinterpreted phrases or words.

## 2019-10-08 ENCOUNTER — HOSPITAL ENCOUNTER (OUTPATIENT)
Dept: RADIOLOGY | Facility: HOSPITAL | Age: 84
Discharge: HOME | End: 2019-10-08
Attending: INTERNAL MEDICINE
Payer: MEDICARE

## 2019-10-08 ENCOUNTER — HOSPITAL ENCOUNTER (OUTPATIENT)
Dept: RADIOLOGY | Facility: HOSPITAL | Age: 84
Discharge: HOME | End: 2019-10-08
Attending: RADIOLOGY
Payer: MEDICARE

## 2019-10-08 DIAGNOSIS — C79.9 METASTATIC DISEASE: ICD-10-CM

## 2019-10-08 PROCEDURE — 72100 XR LUMBAR SPINE 2 OR 3 VIEWS: ICD-10-PCS | Mod: 26,,, | Performed by: RADIOLOGY

## 2019-10-08 PROCEDURE — 72100 X-RAY EXAM L-S SPINE 2/3 VWS: CPT | Mod: TC,FY

## 2019-10-08 PROCEDURE — A9503 TC99M MEDRONATE: HCPCS

## 2019-10-08 PROCEDURE — 72100 X-RAY EXAM L-S SPINE 2/3 VWS: CPT | Mod: 26,,, | Performed by: RADIOLOGY

## 2019-10-08 PROCEDURE — 78306 BONE IMAGING WHOLE BODY: CPT | Mod: 26,,, | Performed by: RADIOLOGY

## 2019-10-08 PROCEDURE — 78306 NM BONE SCAN WHOLE BODY: ICD-10-PCS | Mod: 26,,, | Performed by: RADIOLOGY

## 2019-12-10 ENCOUNTER — HOSPITAL ENCOUNTER (EMERGENCY)
Facility: HOSPITAL | Age: 84
Discharge: HOME | End: 2019-12-10
Attending: EMERGENCY MEDICINE
Payer: MEDICARE

## 2019-12-10 ENCOUNTER — TELEPHONE (OUTPATIENT)
Dept: ORTHOPEDICS | Facility: CLINIC | Age: 84
End: 2019-12-10

## 2019-12-10 VITALS
SYSTOLIC BLOOD PRESSURE: 166 MMHG | TEMPERATURE: 98 F | RESPIRATION RATE: 18 BRPM | WEIGHT: 112 LBS | DIASTOLIC BLOOD PRESSURE: 73 MMHG | OXYGEN SATURATION: 94 % | BODY MASS INDEX: 21.14 KG/M2 | HEART RATE: 79 BPM | HEIGHT: 61 IN

## 2019-12-10 DIAGNOSIS — M25.562 LEFT KNEE PAIN: ICD-10-CM

## 2019-12-10 PROCEDURE — 99283 EMERGENCY DEPT VISIT LOW MDM: CPT | Mod: 25

## 2019-12-10 PROCEDURE — 25000003 PHARM REV CODE 250: Performed by: PHYSICIAN ASSISTANT

## 2019-12-10 RX ORDER — ACETAMINOPHEN 500 MG
500 TABLET ORAL
Status: COMPLETED | OUTPATIENT
Start: 2019-12-10 | End: 2019-12-10

## 2019-12-10 RX ORDER — MELOXICAM 7.5 MG/1
7.5 TABLET ORAL DAILY
Qty: 5 TABLET | Refills: 0 | Status: SHIPPED | OUTPATIENT
Start: 2019-12-10 | End: 2020-01-06 | Stop reason: ALTCHOICE

## 2019-12-10 RX ADMIN — ACETAMINOPHEN 500 MG: 500 TABLET ORAL at 12:12

## 2019-12-10 NOTE — ED PROVIDER NOTES
"Encounter Date: 12/10/2019    SCRIBE #1 NOTE: I, Moses Rapp, am scribing for, and in the presence of, Tiesha Quintanilla PA-C.       History     Chief Complaint   Patient presents with    Knee Pain     left knee pain x 3 weeks / hx. knee replacement      Time seen by provider: 12:05 PM on 12/10/2019      Shalini Donald is a 85 y.o. female with a PMHx of arthritis, HTN, and back pain who presents to the ED for left knee pain that started 1 month ago. The patient reports that 3 weeks ago she fell in the bathtub, but reports "I don't remember hitting my knee because my shoulders took the brunt of the fall." Patient reports that since around the time of the fall she has been having left knee pain that is more in the anterior knee. She states that this pain is more of an achy dull pain in the left knee and is worst with ambulation. She states that she has relief with rest. She does endorse some slight knee swelling. Patient admits that she attempted to make an appointment with her orthopedic physician, but she states "He is out of town till next week." Patient does admit that she has had bilateral knee replacements, with the most recent being the left knee 1 year ago. Patient reports that she has been taking 325 Asprin for the pain, which offers her some relief. The patient denies calf pain, hip pain, pain behind the knee, color change, numbness, weakness, or any other complaint at this time. The patient has a PSHx of cholecystectomy and bilateral knee replacements.        Review of patient's allergies indicates:   Allergen Reactions    Pcn [penicillins] Rash    Tylenol [acetaminophen] Other (See Comments)     headaches     Past Medical History:   Diagnosis Date    Arthritis     Back pain     Hypertension     Wears glasses     Wears partial dentures     upper     Past Surgical History:   Procedure Laterality Date    CHOLECYSTECTOMY      EYE SURGERY Bilateral     cataracts    JOINT REPLACEMENT Right     knee "    KNEE SURGERY       No family history on file.  Social History     Tobacco Use    Smoking status: Never Smoker    Smokeless tobacco: Never Used   Substance Use Topics    Alcohol use: No    Drug use: No     Review of Systems   Constitutional: Negative for fever.   HENT: Negative for sore throat.    Respiratory: Negative for shortness of breath.    Cardiovascular: Negative for chest pain.   Gastrointestinal: Negative for abdominal pain and nausea.   Genitourinary: Negative for dysuria.   Musculoskeletal: Positive for arthralgias and joint swelling. Negative for back pain and myalgias.   Skin: Negative for color change and rash.   Neurological: Negative for weakness and numbness.   Hematological: Does not bruise/bleed easily.       Physical Exam     Initial Vitals [12/10/19 1153]   BP Pulse Resp Temp SpO2   (!) 166/73 79 18 97.7 °F (36.5 °C) (!) 94 %      MAP       --         Physical Exam    Nursing note and vitals reviewed.  Constitutional: She appears well-developed and well-nourished.   HENT:   Head: Atraumatic.   Eyes: EOM are normal. Pupils are equal, round, and reactive to light.   Cardiovascular: Normal rate, regular rhythm, intact distal pulses and normal pulses.   Dorsalis pedis pulses present and equal.   Pulmonary/Chest: Breath sounds normal. She has no wheezes. She has no rhonchi.   Musculoskeletal: She exhibits edema. She exhibits no tenderness.   Full extension, but unable to fully flex right knee secondary to pain. Mild swelling anterior left knee. No erythema. No tenderness to the left knee. Scar to the left anterior knee. No right calf pain or swelling. Right knee normal.   Neurological: She is alert and oriented to person, place, and time.   Skin: Skin is warm. Capillary refill takes less than 2 seconds.   Psychiatric: She has a normal mood and affect. Her speech is normal.         ED Course   Procedures  Labs Reviewed - No data to display       Imaging Results          X-Ray Knee 3 View Left  "(Final result)  Result time 12/10/19 12:47:12    Final result by Prudencio Mustafa MD (12/10/19 12:47:12)                 Impression:      Status post left knee arthroplasty without complication.      Electronically signed by: Prudencio Mustafa MD  Date:    12/10/2019  Time:    12:47             Narrative:    EXAMINATION:  XR KNEE 3 VIEW LEFT    CLINICAL HISTORY:  Pain in left knee    TECHNIQUE:  AP, lateral, and Merchant views of the left knee were performed.    COMPARISON:  06/04/2018    FINDINGS:  There has been a tricompartmental left knee arthroplasty.  No evidence for loosening, migration, or periprosthetic fracture.                                 Medical Decision Making:   History:   Old Medical Records: I decided to obtain old medical records.  Clinical Tests:   Radiological Study: Ordered and Reviewed       APC / Resident Notes:   Patient presents to the ER for evaluation of anterior left knee pain worsening over the last month. No known trauma.     Patient says she does not have severe pain today, but was unable to get in with her orthopedic physician so she came to the ER for evaluation.  The patient's xrays show " There has been a tricompartmental left knee arthroplasty.  No evidence for loosening, migration, or periprosthetic fracture."  I have considered mild knee strain, mild knee effusion, arthritis.  No calf pain or swelling to suggest DVT.  She is neurovascularly intact.    The patient is ambulating without difficulty and the knee doesn't appear to be unstable. I have provided her with ace wrap, advised to elevate the leg and take tylenol for pain.  Patients tolerate tylenol well in the ED.  I have given prescription for mobic 7.5 mg prn, but have advised that she not take her prn 325 ASA while taking this medication.  Patient states understanding.  I have advised close follow-up with her PCP as well as orthopedic clinic for ER follow-up exam within 1 week.  Patient is given ER return " precautions.  I discussed the care this patient my supervising physician.         Scribe Attestation:   Scribe #1: I performed the above scribed service and the documentation accurately describes the services I performed. I attest to the accuracy of the note.    I, Tiesha Quintanilla, personally performed the services described in this documentation. All medical record entries made by the scribe were at my direction and in my presence.  I have reviewed the chart and agree that the record reflects my personal performance and is accurate and complete. Tiesha Quintanilla, APC.  12:59 PM 12/10/2019                        Clinical Impression:       ICD-10-CM ICD-9-CM   1. Left knee pain M25.562 719.46         Disposition:   Disposition: Discharged  Condition: Stable                     ALEX Barnes  12/10/19 1319       ALEX Barnes  12/10/19 1412

## 2019-12-10 NOTE — TELEPHONE ENCOUNTER
----- Message from Rita Araujo sent at 12/10/2019  9:16 AM CST -----  Contact: patient  Patient states she has severe pain in her right knee and needs to see Dr. Aquino first thing Monday, I did advise her if it was too bad to go to the ER, call 911, go to Urgent Care, or see her PCP until we get her in. She stated she could withstand until Monday she thinks, call her back at [phone].

## 2019-12-10 NOTE — DISCHARGE INSTRUCTIONS
Take Mobic as prescribed for pain x 5 days.  Do not take extra ASA while taking this medication.      Take Tylenol 500 mg every 8 hours for pain.

## 2019-12-18 ENCOUNTER — OFFICE VISIT (OUTPATIENT)
Dept: ORTHOPEDICS | Facility: CLINIC | Age: 84
End: 2019-12-18
Payer: MEDICARE

## 2019-12-18 VITALS
BODY MASS INDEX: 21.52 KG/M2 | SYSTOLIC BLOOD PRESSURE: 138 MMHG | DIASTOLIC BLOOD PRESSURE: 80 MMHG | HEIGHT: 61 IN | WEIGHT: 114 LBS | HEART RATE: 78 BPM

## 2019-12-18 DIAGNOSIS — T84.84XA PAIN DUE TO TOTAL LEFT KNEE REPLACEMENT, INITIAL ENCOUNTER: Primary | ICD-10-CM

## 2019-12-18 DIAGNOSIS — Z96.652 PAIN DUE TO TOTAL LEFT KNEE REPLACEMENT, INITIAL ENCOUNTER: Primary | ICD-10-CM

## 2019-12-18 PROCEDURE — 1159F MED LIST DOCD IN RCRD: CPT | Mod: S$GLB,,, | Performed by: ORTHOPAEDIC SURGERY

## 2019-12-18 PROCEDURE — 99214 OFFICE O/P EST MOD 30 MIN: CPT | Mod: 25,S$GLB,, | Performed by: ORTHOPAEDIC SURGERY

## 2019-12-18 PROCEDURE — 1125F PR PAIN SEVERITY QUANTIFIED, PAIN PRESENT: ICD-10-PCS | Mod: S$GLB,,, | Performed by: ORTHOPAEDIC SURGERY

## 2019-12-18 PROCEDURE — 1125F AMNT PAIN NOTED PAIN PRSNT: CPT | Mod: S$GLB,,, | Performed by: ORTHOPAEDIC SURGERY

## 2019-12-18 PROCEDURE — 1159F PR MEDICATION LIST DOCUMENTED IN MEDICAL RECORD: ICD-10-PCS | Mod: S$GLB,,, | Performed by: ORTHOPAEDIC SURGERY

## 2019-12-18 PROCEDURE — 99214 PR OFFICE/OUTPT VISIT, EST, LEVL IV, 30-39 MIN: ICD-10-PCS | Mod: 25,S$GLB,, | Performed by: ORTHOPAEDIC SURGERY

## 2019-12-18 NOTE — PROGRESS NOTES
[facility]    Subjective:     Chief Complaint:   Chief Complaint   Patient presents with    Left Knee - Pain     Left knee pain x a while. States that she had a knee replacement back in 2017. States that her knee pain gets worse as the day goes on.        Past Medical History:   Diagnosis Date    Arthritis     Back pain     Hypertension     Wears glasses     Wears partial dentures     upper       Past Surgical History:   Procedure Laterality Date    CHOLECYSTECTOMY      EYE SURGERY Bilateral     cataracts    JOINT REPLACEMENT Right     knee    KNEE SURGERY         Current Outpatient Medications   Medication Sig    aspirin (MIKEY ASPIRIN) 325 MG tablet Mikey Aspirin 325 mg tablet   Take 1 tablet every day by oral route.    cloNIDine (CATAPRES) 0.1 MG tablet     losartan (COZAAR) 100 MG tablet Take 100 mg by mouth once daily.    doxycycline (VIBRAMYCIN) 100 MG Cap     meloxicam (MOBIC) 7.5 MG tablet Take 1 tablet (7.5 mg total) by mouth once daily. (Patient not taking: Reported on 12/18/2019)     No current facility-administered medications for this visit.        Review of patient's allergies indicates:   Allergen Reactions    Pcn [penicillins] Rash    Tylenol [acetaminophen] Other (See Comments)     headaches       History reviewed. No pertinent family history.    Social History     Socioeconomic History    Marital status:      Spouse name: Not on file    Number of children: Not on file    Years of education: Not on file    Highest education level: Not on file   Occupational History    Not on file   Social Needs    Financial resource strain: Not on file    Food insecurity:     Worry: Not on file     Inability: Not on file    Transportation needs:     Medical: Not on file     Non-medical: Not on file   Tobacco Use    Smoking status: Never Smoker    Smokeless tobacco: Never Used   Substance and Sexual Activity    Alcohol use: No    Drug use: No    Sexual activity: Not on  file   Lifestyle    Physical activity:     Days per week: Not on file     Minutes per session: Not on file    Stress: Not on file   Relationships    Social connections:     Talks on phone: Not on file     Gets together: Not on file     Attends Church service: Not on file     Active member of club or organization: Not on file     Attends meetings of clubs or organizations: Not on file     Relationship status: Not on file   Other Topics Concern    Not on file   Social History Narrative    Not on file       History of present illness:  This lady has pain in her left total knee this started a few weeks ago.  No particular injury other than a minor fall hurting her shoulder slightly she has slight bruise lateral left knee but she does not feel like that really cause any injury to the knee but she has been having some swelling mild some pain off and on not continuous no drainage no fever pain left knee. She total knee about 2 years.  She went the emergency room they x-rayed her knee could find anything impressive.  She is having actually less pain today.  The pain is more on and off sometimes needs a cane      Review of Systems:    Constitution: Negative for chills, fever, and sweats.  Negative for unexplained weight loss.    HENT:  Negative for headaches and blurry vision.    Cardiovascular:Negative for chest pain or irregular heart beat. Negative for hypertension.    Respiratory:  Negative for cough and shortness of breath.    Gastrointestinal: Negative for abdominal pain, heartburn, melena, nausea, and vomitting.    Genitourinary:  Negative bladder incontinence and dysuria.    Musculoskeletal:  See HPI for details.     Neurological: Negative for numbness.    Psychiatric/Behavioral: Negative for depression.  The patient is not nervous/anxious.      Endocrine: Negative for polyuria    Hematologic/Lymphatic: Negative for bleeding problem.  Does not bruise/bleed easily.    Skin: Negative for poor would healing and  rash    Objective:      Physical Examination:    Vital Signs:    Vitals:    12/18/19 1142   BP: 138/80   Pulse: 78       Body mass index is 21.54 kg/m².    This a well-developed, well nourished patient in no acute distress.  They are alert and oriented and cooperative to examination.        So physical exam left knee shows that she has very good range of motion left knee she has got no flexion instability she is going to no instability extension to get full flexion full extension equal to the other total knee wound looks fine left knee is slightly bigger than the right 1 overall not particularly inflamed certainly no drainage patella not tender patellar tendon not tender.  Minimal subcu ecchymosis above and lateral to the knee. No significant limp  Pertinent New Results:    XRAY Report / Interpretation:   Standing x-ray of both knees shows total knee arthroplasty then matched up very nicely other is no evidence for loosening or malposition of the left or right knee nor any acute findings.  We have a standing AP x-ray both knees we ordered an x-ray of the left knee but this x-ray includes both knees because it is supposed to were looking at the left knee and comparing it to the right knee on the standing AP x-ray that happens to include both knees because it is supposed to.  Signature    Assessment/Plan:      X-rays reviewed from emergency room about a week ago shows a total knee on the left all three views look excellent no malalignment no acute findings.  So we have a painful total knee off and on pain off and on since mild swelling our plan is routine workup was CT scan CBC CRP sed rate.  She has had no other major medical issues recently.  Does not have pain keeps her up at night does not require narcotics at this time. We will see her back after these tests are done      This note was created using Dragon voice recognition software that occasionally misinterpreted phrases or words.

## 2019-12-20 ENCOUNTER — HOSPITAL ENCOUNTER (OUTPATIENT)
Dept: RADIOLOGY | Facility: HOSPITAL | Age: 84
Discharge: HOME | End: 2019-12-20
Attending: ORTHOPAEDIC SURGERY
Payer: MEDICARE

## 2019-12-20 DIAGNOSIS — T84.84XA PAIN DUE TO TOTAL LEFT KNEE REPLACEMENT, INITIAL ENCOUNTER: ICD-10-CM

## 2019-12-20 DIAGNOSIS — Z96.652 PAIN DUE TO TOTAL LEFT KNEE REPLACEMENT, INITIAL ENCOUNTER: ICD-10-CM

## 2019-12-20 PROCEDURE — 73700 CT LOWER EXTREMITY W/O DYE: CPT | Mod: TC,PO,LT

## 2020-01-06 ENCOUNTER — OFFICE VISIT (OUTPATIENT)
Dept: ORTHOPEDICS | Facility: CLINIC | Age: 85
End: 2020-01-06
Payer: MEDICARE

## 2020-01-06 VITALS
BODY MASS INDEX: 21.52 KG/M2 | DIASTOLIC BLOOD PRESSURE: 77 MMHG | SYSTOLIC BLOOD PRESSURE: 133 MMHG | HEIGHT: 61 IN | HEART RATE: 91 BPM | WEIGHT: 114 LBS

## 2020-01-06 DIAGNOSIS — Z96.652 HISTORY OF TOTAL LEFT KNEE REPLACEMENT: ICD-10-CM

## 2020-01-06 DIAGNOSIS — M25.551 RIGHT HIP PAIN: Primary | ICD-10-CM

## 2020-01-06 DIAGNOSIS — M54.50 LUMBAR PAIN: ICD-10-CM

## 2020-01-06 PROCEDURE — 99214 OFFICE O/P EST MOD 30 MIN: CPT | Mod: 25,S$GLB,, | Performed by: ORTHOPAEDIC SURGERY

## 2020-01-06 PROCEDURE — 1125F PR PAIN SEVERITY QUANTIFIED, PAIN PRESENT: ICD-10-PCS | Mod: S$GLB,,, | Performed by: ORTHOPAEDIC SURGERY

## 2020-01-06 PROCEDURE — 1159F MED LIST DOCD IN RCRD: CPT | Mod: S$GLB,,, | Performed by: ORTHOPAEDIC SURGERY

## 2020-01-06 PROCEDURE — 1159F PR MEDICATION LIST DOCUMENTED IN MEDICAL RECORD: ICD-10-PCS | Mod: S$GLB,,, | Performed by: ORTHOPAEDIC SURGERY

## 2020-01-06 PROCEDURE — 99214 PR OFFICE/OUTPT VISIT, EST, LEVL IV, 30-39 MIN: ICD-10-PCS | Mod: 25,S$GLB,, | Performed by: ORTHOPAEDIC SURGERY

## 2020-01-06 PROCEDURE — 1125F AMNT PAIN NOTED PAIN PRSNT: CPT | Mod: S$GLB,,, | Performed by: ORTHOPAEDIC SURGERY

## 2020-01-06 NOTE — PROGRESS NOTES
[facility] ORTHOPEDICS    Subjective:     Chief Complaint:   Chief Complaint   Patient presents with    Left Knee - Pain     Left knee pain/ CT/LAB f/u. States that her knee feels better.     Right Hip - Pain     Right hip pain x 4 months. States that she has pain when she presses on her hip. States that the pain stays in the hip. Her pain depends on what she is doing.        Past Medical History:   Diagnosis Date    Arthritis     Back pain     Hypertension     Wears glasses     Wears partial dentures     upper       Past Surgical History:   Procedure Laterality Date    CHOLECYSTECTOMY      EYE SURGERY Bilateral     cataracts    JOINT REPLACEMENT Right     knee    KNEE SURGERY         Current Outpatient Medications   Medication Sig    aspirin (MIKEY ASPIRIN) 325 MG tablet Mikey Aspirin 325 mg tablet   Take 1 tablet every day by oral route.    losartan (COZAAR) 100 MG tablet Take 100 mg by mouth once daily.    cloNIDine (CATAPRES) 0.1 MG tablet     doxycycline (VIBRAMYCIN) 100 MG Cap     meloxicam (MOBIC) 7.5 MG tablet Take 1 tablet (7.5 mg total) by mouth once daily. (Patient not taking: Reported on 12/18/2019)     No current facility-administered medications for this visit.        Review of patient's allergies indicates:   Allergen Reactions    Pcn [penicillins] Rash    Tylenol [acetaminophen] Other (See Comments)     headaches       History reviewed. No pertinent family history.    Social History     Socioeconomic History    Marital status:      Spouse name: Not on file    Number of children: Not on file    Years of education: Not on file    Highest education level: Not on file   Occupational History    Not on file   Social Needs    Financial resource strain: Not on file    Food insecurity:     Worry: Not on file     Inability: Not on file    Transportation needs:     Medical: Not on file     Non-medical: Not on file   Tobacco Use    Smoking status: Never Smoker    Smokeless  tobacco: Never Used   Substance and Sexual Activity    Alcohol use: No    Drug use: No    Sexual activity: Not on file   Lifestyle    Physical activity:     Days per week: Not on file     Minutes per session: Not on file    Stress: Not on file   Relationships    Social connections:     Talks on phone: Not on file     Gets together: Not on file     Attends Mu-ism service: Not on file     Active member of club or organization: Not on file     Attends meetings of clubs or organizations: Not on file     Relationship status: Not on file   Other Topics Concern    Not on file   Social History Narrative    Not on file       History of present illness:  So a workup for left total knee shows normal CRP normal sed rate normal CBC and a normal CT scan.  She feels that the left knee pain has now resolved.  She is not having any specific problems with the left knee or the right knee.  She has both total knees.  She does have some right-sided buttocks pain at times. Does not describe radiculopathy.  Does not describe groin pain with weight-bearing.    Review of Systems:    Constitution: Negative for chills, fever, and sweats.  Negative for unexplained weight loss.    HENT:  Negative for headaches and blurry vision.    Cardiovascular:Negative for chest pain or irregular heart beat. Negative for hypertension.    Respiratory:  Negative for cough and shortness of breath.    Gastrointestinal: Negative for abdominal pain, heartburn, melena, nausea, and vomitting.    Genitourinary:  Negative bladder incontinence and dysuria.    Musculoskeletal:  See HPI for details.     Neurological: Negative for numbness.    Psychiatric/Behavioral: Negative for depression.  The patient is not nervous/anxious.      Endocrine: Negative for polyuria    Hematologic/Lymphatic: Negative for bleeding problem.  Does not bruise/bleed easily.    Skin: Negative for poor would healing and rash    Objective:      Physical Examination:    Vital Signs:     Vitals:    01/06/20 1028   BP: 133/77   Pulse: 91       Body mass index is 21.54 kg/m².    This a well-developed, well nourished patient in no acute distress.  They are alert and oriented and cooperative to examination.        Physical exam shows good range of motion right hip no pain.  Both knees have no swelling no ecchymosis some limits to flexion on the left side but clearly well past 90.  No limp.  No inflammation swelling of either knee. She has minimal tenderness right trochanter.  She has negative straight leg raise.  She has no limp she has mild tenderness right lumbar but has a good a reverse straight leg raise.  And no heart spasm lumbar  Pertinent New Results:    XRAY Report / Interpretation:   AP pelvis is unremarkable there is no significant arthritic changes no hardware there are no acute findings.  Signature includes AP and lateral of the right hip.    Assessment/Plan:      Prior x-ray had shown significant degenerative changes at L4-5 with some flattening of the lumbar lordosis.  So I believe the majority of a right lumbar pain is coming from this degenerative L4-5.  She needs some anti-inflammatories which she can take.. she does not need any interventional therapy at this time. I do not think she needs any therapy at this time could she has only minimal symptoms right lumbar.  Both knees total knees are holding up well.  I do not have anything to worry about on either 1 of her total knees.  We will see her back p.r.n.      This note was created using Dragon voice recognition software that occasionally misinterpreted phrases or words.

## 2020-09-02 ENCOUNTER — OFFICE VISIT (OUTPATIENT)
Dept: ORTHOPEDICS | Facility: CLINIC | Age: 85
End: 2020-09-02
Payer: MEDICARE

## 2020-09-02 VITALS
WEIGHT: 114 LBS | BODY MASS INDEX: 21.52 KG/M2 | HEIGHT: 61 IN | SYSTOLIC BLOOD PRESSURE: 130 MMHG | DIASTOLIC BLOOD PRESSURE: 79 MMHG | HEART RATE: 76 BPM

## 2020-09-02 DIAGNOSIS — Z96.652 HISTORY OF TOTAL LEFT KNEE REPLACEMENT: Primary | ICD-10-CM

## 2020-09-02 DIAGNOSIS — Z96.651 HISTORY OF TOTAL RIGHT KNEE REPLACEMENT: ICD-10-CM

## 2020-09-02 DIAGNOSIS — Z91.81 PERSONAL HISTORY OF FALL: ICD-10-CM

## 2020-09-02 PROCEDURE — 99213 OFFICE O/P EST LOW 20 MIN: CPT | Mod: S$GLB,,, | Performed by: ORTHOPAEDIC SURGERY

## 2020-09-02 PROCEDURE — 99213 PR OFFICE/OUTPT VISIT, EST, LEVL III, 20-29 MIN: ICD-10-PCS | Mod: S$GLB,,, | Performed by: ORTHOPAEDIC SURGERY

## 2020-09-02 NOTE — PROGRESS NOTES
[facility] ORTHOPEDICS    Subjective:     Chief Complaint:   Chief Complaint   Patient presents with    Left Knee - Pain     Left knee pain x a few weeks. States that she had a fall, and has pain in her knees, she did have a TKA done 11.21.17    Right Knee - Pain     Right knee pain x a few weeks. States that she had a fall and hurt her knee.She did have a TKA many years ago.        Past Medical History:   Diagnosis Date    Arthritis     Back pain     Hypertension     Wears glasses     Wears partial dentures     upper       Past Surgical History:   Procedure Laterality Date    CHOLECYSTECTOMY      EYE SURGERY Bilateral     cataracts    JOINT REPLACEMENT Right     knee    KNEE SURGERY         Current Outpatient Medications   Medication Sig    aspirin (MIKEY ASPIRIN) 325 MG tablet Mikey Aspirin 325 mg tablet   Take 1 tablet every day by oral route.    losartan (COZAAR) 100 MG tablet Take 100 mg by mouth once daily.    cloNIDine (CATAPRES) 0.1 MG tablet     doxycycline (VIBRAMYCIN) 100 MG Cap      No current facility-administered medications for this visit.        Review of patient's allergies indicates:   Allergen Reactions    Pcn [penicillins] Rash    Tylenol [acetaminophen] Other (See Comments)     headaches       History reviewed. No pertinent family history.    Social History     Socioeconomic History    Marital status:      Spouse name: Not on file    Number of children: Not on file    Years of education: Not on file    Highest education level: Not on file   Occupational History    Not on file   Social Needs    Financial resource strain: Not on file    Food insecurity     Worry: Not on file     Inability: Not on file    Transportation needs     Medical: Not on file     Non-medical: Not on file   Tobacco Use    Smoking status: Never Smoker    Smokeless tobacco: Never Used   Substance and Sexual Activity    Alcohol use: No    Drug use: No    Sexual activity: Not on file    Lifestyle    Physical activity     Days per week: Not on file     Minutes per session: Not on file    Stress: Not on file   Relationships    Social connections     Talks on phone: Not on file     Gets together: Not on file     Attends Religion service: Not on file     Active member of club or organization: Not on file     Attends meetings of clubs or organizations: Not on file     Relationship status: Not on file   Other Topics Concern    Not on file   Social History Narrative    Not on file       History of present illness:  I she has got some concern about her knees apparently she had some minor fall she wants to make sure they are working well.  She has got 2 total knees.  She is walking of.      Review of Systems:    Constitution: Negative for chills, fever, and sweats.  Negative for unexplained weight loss.    HENT:  Negative for headaches and blurry vision.    Cardiovascular:Negative for chest pain or irregular heart beat. Negative for hypertension.    Respiratory:  Negative for cough and shortness of breath.    Gastrointestinal: Negative for abdominal pain, heartburn, melena, nausea, and vomitting.    Genitourinary:  Negative bladder incontinence and dysuria.    Musculoskeletal:  See HPI for details.     Neurological: Negative for numbness.    Psychiatric/Behavioral: Negative for depression.  The patient is not nervous/anxious.      Endocrine: Negative for polyuria    Hematologic/Lymphatic: Negative for bleeding problem.  Does not bruise/bleed easily.    Skin: Negative for poor would healing and rash    Objective:      Physical Examination:    Vital Signs:    Vitals:    09/02/20 0847   BP: 130/79   Pulse: 76       Body mass index is 21.54 kg/m².    This a well-developed, well nourished patient in no acute distress.  They are alert and oriented and cooperative to examination.        So right knee has a good range of motion left total knee scar no ecchymosis no swelling no tenderness no instability  nothing around the patella no antalgic gait left knee slightly bigger than the right little bit thicker does not have quite as good flexion but flexes back to 100 of no tenderness no ecchymosis no instability no good no limp  Pertinent New Results:    XRAY Report / Interpretation:   AP lateral sunrise right knee shows total knee arthroplasty in good position no malposition and no sign of loosening.  AP lateral sunrise left knee shows total knee arthroplasty in good position no acute findings the evidence for malposition or implant failure.  The 2 implants are same design and line up nicely on AP.  Signature    Assessment/Plan:      So she may have had a minor contusion there is no contusion to see she certainly did not disrupt anything inside her knees and she is good to go no changes in her implants walked out the door      This note was created using Dragon voice recognition software that occasionally misinterpreted phrases or words.

## 2020-09-17 ENCOUNTER — HOSPITAL ENCOUNTER (EMERGENCY)
Facility: HOSPITAL | Age: 85
Discharge: HOME | End: 2020-09-18
Attending: EMERGENCY MEDICINE
Payer: MEDICARE

## 2020-09-17 VITALS
BODY MASS INDEX: 21.52 KG/M2 | SYSTOLIC BLOOD PRESSURE: 189 MMHG | RESPIRATION RATE: 16 BRPM | HEART RATE: 71 BPM | TEMPERATURE: 98 F | HEIGHT: 61 IN | WEIGHT: 114 LBS | DIASTOLIC BLOOD PRESSURE: 80 MMHG | OXYGEN SATURATION: 97 %

## 2020-09-17 DIAGNOSIS — T63.464A WASP STING, UNDETERMINED INTENT, INITIAL ENCOUNTER: Primary | ICD-10-CM

## 2020-09-17 PROCEDURE — 99284 EMERGENCY DEPT VISIT MOD MDM: CPT | Mod: 25

## 2020-09-17 PROCEDURE — 96372 THER/PROPH/DIAG INJ SC/IM: CPT

## 2020-09-18 PROCEDURE — 63600175 PHARM REV CODE 636 W HCPCS: Performed by: EMERGENCY MEDICINE

## 2020-09-18 RX ORDER — DEXAMETHASONE SODIUM PHOSPHATE 4 MG/ML
8 INJECTION, SOLUTION INTRA-ARTICULAR; INTRALESIONAL; INTRAMUSCULAR; INTRAVENOUS; SOFT TISSUE
Status: COMPLETED | OUTPATIENT
Start: 2020-09-18 | End: 2020-09-18

## 2020-09-18 RX ORDER — DOXYCYCLINE 50 MG/1
100 CAPSULE ORAL EVERY 12 HOURS
Qty: 20 CAPSULE | Refills: 0 | Status: SHIPPED | OUTPATIENT
Start: 2020-09-18 | End: 2023-10-10

## 2020-09-18 RX ORDER — DIPHENHYDRAMINE HCL 25 MG
25 CAPSULE ORAL EVERY 6 HOURS PRN
Qty: 20 CAPSULE | Refills: 0 | Status: SHIPPED | OUTPATIENT
Start: 2020-09-18 | End: 2023-10-10

## 2020-09-18 RX ADMIN — DEXAMETHASONE SODIUM PHOSPHATE 8 MG: 4 INJECTION, SOLUTION INTRA-ARTICULAR; INTRALESIONAL; INTRAMUSCULAR; INTRAVENOUS; SOFT TISSUE at 12:09

## 2020-09-18 NOTE — ED PROVIDER NOTES
Encounter Date: 9/17/2020    SCRIBE #1 NOTE: Yeni BRAND, diana scribing for, and in the presence of, Enrique Rosado MD.       History     Chief Complaint   Patient presents with    Insect Bite     stung on right lower leg by wasp with redness and swelling       Time seen by provider: 12:13 AM on 09/18/2020    Shalini Donald is a 85 y.o. female with PMHx of HTN who presents to the ED with complaints of right lower leg redness and swelling s/p insect bite that occurred PTA. Patient suspects being stung by 2 wasps in the lower leg region and reports associated itchiness. She applied ointment to the region with little to no relief. She denies fever, chills, or other symptoms. Patient has no other medical concerns or complaints at this moment. PSHx includes cholecystectomy.     The history is provided by the patient.     Review of patient's allergies indicates:   Allergen Reactions    Pcn [penicillins] Rash    Tylenol [acetaminophen] Other (See Comments)     headaches     Past Medical History:   Diagnosis Date    Arthritis     Back pain     Hypertension     Wears glasses     Wears partial dentures     upper     Past Surgical History:   Procedure Laterality Date    CHOLECYSTECTOMY      EYE SURGERY Bilateral     cataracts    JOINT REPLACEMENT Right     knee    KNEE SURGERY       No family history on file.  Social History     Tobacco Use    Smoking status: Never Smoker    Smokeless tobacco: Never Used   Substance Use Topics    Alcohol use: No    Drug use: No     Review of Systems   Constitutional: Negative for chills and fever.   HENT: Negative for congestion.    Respiratory: Negative for shortness of breath.    Cardiovascular: Negative for chest pain.   Gastrointestinal: Negative for vomiting.   Musculoskeletal: Negative for joint swelling.        + right lower leg swelling w/ itching   Skin: Positive for color change.   Neurological: Negative for weakness.   Hematological: Does not bruise/bleed  easily.   Psychiatric/Behavioral: The patient is not nervous/anxious.        Physical Exam     Initial Vitals [09/17/20 2321]   BP Pulse Resp Temp SpO2   (!) 189/80 71 16 97.8 °F (36.6 °C) 97 %      MAP       --         Physical Exam    Nursing note and vitals reviewed.  Constitutional: She appears well-developed.  Non-toxic appearance.   Nontoxic, well appearing female.    HENT:   Head: Normocephalic and atraumatic.   Eyes: EOM are normal. Pupils are equal, round, and reactive to light.   Neck: Neck supple.   Cardiovascular: Normal rate, regular rhythm, normal heart sounds and intact distal pulses. Exam reveals no gallop and no friction rub.    No murmur heard.  Pulmonary/Chest: Breath sounds normal. No respiratory distress. She has no decreased breath sounds. She has no wheezes. She has no rhonchi. She has no rales.   Musculoskeletal: Normal range of motion.   Neurological: She is alert and oriented to person, place, and time.   Skin: Skin is warm and dry. There is erythema.   Wasp sting on RLE with mild swelling and minimal erythema.    Psychiatric: She has a normal mood and affect.         ED Course   Procedures  Labs Reviewed - No data to display       Imaging Results    None          Medical Decision Making:   History:   Old Medical Records: I decided to obtain old medical records.  Initial Assessment:   85-year-old woman presents for insect bite/wasp sting to right lower extremity.  Mild surrounding erythema likely indicative of localized allergic reaction however cannot rule out early cellulitis.  Patient will be treated with antibiotics and is to take Benadryl for the itching.  Return precautions discussed for worsening symptoms.  Her the was mild and not extensive.  I see no evidence of abscess.  I do not think she needs admission for IV antibiotics.  No evidence diffuse allergic reaction suspect anaphylaxis.  Discharged improved in no acute distress.            Scribe Attestation:   Scribe #1: I performed  the above scribed service and the documentation accurately describes the services I performed. I attest to the accuracy of the note.      I, Alonzo Zimmerman, personally performed the services described in this documentation. All medical record entries made by the scribe were at my direction and in my presence.  I have reviewed the chart and agree that the record reflects my personal performance and is accurate and complete. Enrique Rosado MD.             Clinical Impression:     ICD-10-CM ICD-9-CM   1. Wasp sting, undetermined intent, initial encounter  T63.464A 989.5     E980.9                      Disposition:   Disposition: Discharged  Condition: Stable     ED Disposition Condition    Discharge Stable        ED Prescriptions     Medication Sig Dispense Start Date End Date Auth. Provider    doxycycline (MONODOX) 50 MG Cap Take 2 capsules (100 mg total) by mouth every 12 (twelve) hours. 20 capsule 9/18/2020  Enrique Rosado MD    diphenhydrAMINE (BENADRYL) 25 mg capsule Take 1 capsule (25 mg total) by mouth every 6 (six) hours as needed for Itching or Allergies. 20 capsule 9/18/2020  Enrique Rosado MD        Follow-up Information     Follow up With Specialties Details Why Contact Info    Kali Mccullough MD Internal Medicine Schedule an appointment as soon as possible for a visit   [address] [place] [address] [facility] [address]  [phone]      [facility] Emergency Medicine  As needed, If symptoms worsen [address] [place] [id number]  [phone]                                       Enrique Rosado MD  09/19/20 2013

## 2021-04-26 ENCOUNTER — HOSPITAL ENCOUNTER (EMERGENCY)
Facility: HOSPITAL | Age: 86
Discharge: HOME | End: 2021-04-26
Attending: EMERGENCY MEDICINE
Payer: MEDICARE

## 2021-04-26 VITALS
BODY MASS INDEX: 21.9 KG/M2 | DIASTOLIC BLOOD PRESSURE: 73 MMHG | RESPIRATION RATE: 16 BRPM | HEART RATE: 79 BPM | HEIGHT: 62 IN | SYSTOLIC BLOOD PRESSURE: 161 MMHG | TEMPERATURE: 99 F | WEIGHT: 119 LBS | OXYGEN SATURATION: 97 %

## 2021-04-26 DIAGNOSIS — M54.2 NECK PAIN: Primary | ICD-10-CM

## 2021-04-26 PROCEDURE — 99284 EMERGENCY DEPT VISIT MOD MDM: CPT | Mod: 25

## 2021-07-04 ENCOUNTER — HOSPITAL ENCOUNTER (EMERGENCY)
Facility: HOSPITAL | Age: 86
Discharge: HOME | End: 2021-07-04
Attending: EMERGENCY MEDICINE
Payer: MEDICARE

## 2021-07-04 VITALS
RESPIRATION RATE: 18 BRPM | BODY MASS INDEX: 22.66 KG/M2 | HEART RATE: 77 BPM | DIASTOLIC BLOOD PRESSURE: 94 MMHG | TEMPERATURE: 98 F | HEIGHT: 61 IN | OXYGEN SATURATION: 95 % | WEIGHT: 120 LBS | SYSTOLIC BLOOD PRESSURE: 170 MMHG

## 2021-07-04 DIAGNOSIS — R21 RASH: Primary | ICD-10-CM

## 2021-07-04 DIAGNOSIS — L30.9 DERMATITIS: ICD-10-CM

## 2021-07-04 PROCEDURE — 99282 EMERGENCY DEPT VISIT SF MDM: CPT

## 2021-07-04 RX ORDER — HYDROCORTISONE VALERATE CREAM 2 MG/G
CREAM TOPICAL 2 TIMES DAILY
Qty: 15 G | Refills: 0 | Status: SHIPPED | OUTPATIENT
Start: 2021-07-04 | End: 2023-10-10

## 2021-12-26 ENCOUNTER — HOSPITAL ENCOUNTER (EMERGENCY)
Facility: HOSPITAL | Age: 86
Discharge: HOME | End: 2021-12-26
Attending: EMERGENCY MEDICINE
Payer: MEDICARE

## 2021-12-26 VITALS
BODY MASS INDEX: 22.66 KG/M2 | TEMPERATURE: 98 F | HEART RATE: 98 BPM | OXYGEN SATURATION: 97 % | SYSTOLIC BLOOD PRESSURE: 137 MMHG | HEIGHT: 61 IN | WEIGHT: 120 LBS | RESPIRATION RATE: 16 BRPM | DIASTOLIC BLOOD PRESSURE: 63 MMHG

## 2021-12-26 DIAGNOSIS — Z76.0 MEDICATION REFILL: Primary | ICD-10-CM

## 2021-12-26 PROCEDURE — 25000003 PHARM REV CODE 250: Performed by: EMERGENCY MEDICINE

## 2021-12-26 PROCEDURE — 99283 EMERGENCY DEPT VISIT LOW MDM: CPT

## 2021-12-26 RX ORDER — LOSARTAN POTASSIUM 25 MG/1
100 TABLET ORAL
Status: COMPLETED | OUTPATIENT
Start: 2021-12-26 | End: 2021-12-26

## 2021-12-26 RX ORDER — LOSARTAN POTASSIUM 100 MG/1
100 TABLET ORAL DAILY
Qty: 30 TABLET | Refills: 0 | OUTPATIENT
Start: 2021-12-26 | End: 2022-10-22

## 2021-12-26 RX ADMIN — LOSARTAN POTASSIUM 100 MG: 25 TABLET, FILM COATED ORAL at 12:12

## 2021-12-26 NOTE — ED PROVIDER NOTES
Encounter Date: 12/26/2021       History     Chief Complaint   Patient presents with    Medication Refill     Losartan 100 mg daily      Chief complaint:  Medication refill    HPI:  87-year-old female with a history of hypertension reports that she ran out of her blood pressure medication (losartan 100 mg) 2 days ago.  She denies any symptoms except for a sore throat.  She denies any headache, chest pain or shortness of breath.        Review of patient's allergies indicates:   Allergen Reactions    Pcn [penicillins] Rash    Tylenol [acetaminophen] Other (See Comments)     headaches     Past Medical History:   Diagnosis Date    Arthritis     Back pain     Hypertension     Wears glasses     Wears partial dentures     upper     Past Surgical History:   Procedure Laterality Date    CHOLECYSTECTOMY      EYE SURGERY Bilateral     cataracts    JOINT REPLACEMENT Right     knee    KNEE SURGERY       No family history on file.  Social History     Tobacco Use    Smoking status: Never Smoker    Smokeless tobacco: Never Used   Substance Use Topics    Alcohol use: No    Drug use: No     Review of Systems   Constitutional: Negative for fever.   HENT: Positive for sore throat. Negative for congestion and postnasal drip.    Respiratory: Negative for shortness of breath.    Genitourinary: Negative for flank pain.   Musculoskeletal: Negative for gait problem.   Neurological: Negative for weakness.   Psychiatric/Behavioral: Negative for confusion.       Physical Exam     Initial Vitals [12/26/21 1228]   BP Pulse Resp Temp SpO2   137/63 98 16 98 °F (36.7 °C) 97 %      MAP       --         Physical Exam    Nursing note and vitals reviewed.  Constitutional: She appears well-developed and well-nourished.   HENT:   Head: Normocephalic and atraumatic.   Nose: Nose normal.   Mouth/Throat: Oropharynx is clear and moist.   Eyes: Conjunctivae are normal.   Neck: Neck supple.   Normal range of motion.  Cardiovascular: Normal rate.    Pulmonary/Chest: Effort normal and breath sounds normal. No respiratory distress.   Abdominal: Abdomen is soft. She exhibits no distension. There is no abdominal tenderness.   Musculoskeletal:         General: Normal range of motion.      Cervical back: Normal range of motion and neck supple.     Neurological: She is alert and oriented to person, place, and time.   Skin: Skin is warm and dry. No erythema.   Psychiatric: She has a normal mood and affect.         ED Course   Procedures  Labs Reviewed - No data to display       Imaging Results    None          Medications   losartan tablet 100 mg (100 mg Oral Given 12/26/21 1252)     Medical Decision Making:   ED Management:  87-year-old female presents for refill of her antihypertensive.  Blood pressure is 137/63.  Thirty exam is normal with no evidence of bacterial pharyngitis.                      Clinical Impression:   Final diagnoses:  [Z76.0] Medication refill (Primary)          ED Disposition Condition    Discharge Stable        ED Prescriptions     Medication Sig Dispense Start Date End Date Auth. Provider    losartan (COZAAR) 100 MG tablet Take 1 tablet (100 mg total) by mouth once daily. 30 tablet 12/26/2021  Fabio Francisco III, MD        Follow-up Information     Follow up With Specialties Details Why Contact Info    Kali Mccullough MD Internal Medicine In 1 week  [address] [place] [address] [address] Dempsey  [facility] [address]  [phone]             Fabio Francisco III, MD  12/26/21 1400

## 2022-10-22 ENCOUNTER — HOSPITAL ENCOUNTER (EMERGENCY)
Facility: HOSPITAL | Age: 87
Discharge: HOME | End: 2022-10-22
Attending: EMERGENCY MEDICINE
Payer: MEDICARE

## 2022-10-22 VITALS
OXYGEN SATURATION: 97 % | BODY MASS INDEX: 22.66 KG/M2 | RESPIRATION RATE: 18 BRPM | SYSTOLIC BLOOD PRESSURE: 178 MMHG | TEMPERATURE: 98 F | HEART RATE: 66 BPM | HEIGHT: 61 IN | WEIGHT: 120 LBS | DIASTOLIC BLOOD PRESSURE: 72 MMHG

## 2022-10-22 DIAGNOSIS — Z76.0 MEDICATION REFILL: Primary | ICD-10-CM

## 2022-10-22 PROCEDURE — 99283 EMERGENCY DEPT VISIT LOW MDM: CPT

## 2022-10-22 RX ORDER — VALSARTAN 160 MG/1
160 TABLET ORAL DAILY
Qty: 10 TABLET | Refills: 0 | Status: ON HOLD | OUTPATIENT
Start: 2022-10-22 | End: 2023-10-11

## 2022-10-22 NOTE — ED PROVIDER NOTES
Encounter Date: 10/22/2022    SCRIBE #1 NOTE: IIsa am scribing for, and in the presence of,  SUSAN Treviño.     History     Chief Complaint   Patient presents with    Medication Refill     Valsartan 160 mg      Time seen by provider: 10:30 AM on 10/22/2022    Shalini Donald is a 88 y.o. female with a PMHx of HTN who presents to the ED requesting medication refill. Patient states her PCP is Dr. Mccullough and she is currently on Valsartan 160 mg which she only has 1 pill left of. Patient is unable to get a refill of the medication for the next two days as Dr. Mccullough's clinic is closed over the weekend. Patient denies any chest pain, SOB, or any other Sx at this time. She states she did not take her blood pressure medication this morning. No pertinent PSHx.    The history is provided by the patient.   Review of patient's allergies indicates:   Allergen Reactions    Pcn [penicillins] Rash    Tylenol [acetaminophen] Other (See Comments)     headaches     Past Medical History:   Diagnosis Date    Arthritis     Back pain     Hypertension     Wears glasses     Wears partial dentures     upper     Past Surgical History:   Procedure Laterality Date    CHOLECYSTECTOMY      EYE SURGERY Bilateral     cataracts    JOINT REPLACEMENT Right     knee    KNEE SURGERY       No family history on file.  Social History     Tobacco Use    Smoking status: Never    Smokeless tobacco: Never   Substance Use Topics    Alcohol use: No    Drug use: No     Review of Systems   Constitutional:  Negative for fever.   HENT:  Negative for sore throat.    Respiratory:  Negative for shortness of breath.    Cardiovascular:  Negative for chest pain.   Gastrointestinal:  Negative for nausea.   Genitourinary:  Negative for dysuria.   Musculoskeletal:  Negative for back pain.   Skin:  Negative for rash.   Neurological:  Negative for weakness.   Hematological:  Does not bruise/bleed easily.     Physical Exam     Initial Vitals [10/22/22  1010]   BP Pulse Resp Temp SpO2   (!) 182/79 66 18 97.8 °F (36.6 °C) 95 %      MAP       --         Physical Exam    Nursing note and vitals reviewed.  Constitutional: Vital signs are normal. She appears well-developed and well-nourished.   HENT:   Head: Normocephalic and atraumatic.   Eyes: Pupils are equal, round, and reactive to light.   Neck: Neck supple.   Cardiovascular:  Normal rate, regular rhythm, normal heart sounds and intact distal pulses.     Exam reveals no gallop and no friction rub.       No murmur heard.  Pulmonary/Chest: Breath sounds normal. She has no wheezes. She has no rhonchi. She has no rales.   Abdominal: Abdomen is soft. Bowel sounds are normal. There is no abdominal tenderness.   Musculoskeletal:      Cervical back: Neck supple.     Neurological: She is alert and oriented to person, place, and time. She has normal strength.   Skin: Skin is warm, dry and intact.   Psychiatric: She has a normal mood and affect. Her speech is normal and behavior is normal.       ED Course   Procedures  Labs Reviewed - No data to display       Imaging Results    None          Medications - No data to display  Medical Decision Making:   History:   Old Medical Records: I decided to obtain old medical records.     APC / Resident Notes:   Patient is a 88 y.o. female who presents to the ED 10/22/2022 who underwent emergent evaluation for medication refill.  Patient states she is about to run out of her valsartan 160 mg she takes once a day and cannot get in with her primary care doctor Dr. Mccullough until next week.  She did not take her medications this morning.  She has absolutely no symptoms of anything at this time.  She denies any chest pain.  She denies any shortness of breath.  She is slightly hypertensive here in the emergency department.  I do not think hypertensive crisis or urgency or emergency.  She is instructed to take her blood pressure medication at home as prescribed and she is given a refill of her  current blood pressure medication for 1 week. Based on my clinical evaluation, I do not appreciate any immediate, emergent, or life threatening condition or etiology that warrants additional workup today and feel that the patient can be discharged with close follow up care. Follow up and return precautions discussed; patient verbalized understanding and is agreeable to plan of care. Patient discharged home in stable condition.            Scribe Attestation:   Scribe #1: I performed the above scribed service and the documentation accurately describes the services I performed. I attest to the accuracy of the note.    Attending Attestation:           Physician Attestation for Scribe:  Physician Attestation Statement for Scribe #1: I, Ada Leon, reviewed documentation, as scribed by in my presence, and it is both accurate and complete.     Comments: I, SUSAN Treviño, personally performed the services described in this documentation. All medical record entries made by the scribe were at my direction and in my presence.  I have reviewed the chart and agree that the record reflects my personal performance and is accurate and complete. SUSAN Treviño.  2:02 PM 10/22/2022                     Clinical Impression:   Final diagnoses:  [Z76.0] Medication refill (Primary)        ED Disposition Condition    Discharge Stable          ED Prescriptions       Medication Sig Dispense Start Date End Date Auth. Provider    valsartan (DIOVAN) 160 MG tablet Take 1 tablet (160 mg total) by mouth once daily. for 10 days 10 tablet 10/22/2022 11/1/2022 Ada Leon NP          Follow-up Information       Follow up With Specialties Details Why Contact Info    Kali Mccullough MD Internal Medicine In 3 days  [address] [place] [address] [address]  [facility] [address]  [phone]      [facility] Emergency Dept Emergency Medicine  As needed, If symptoms worsen [address] [place] [id number] [phone]              Ada Leon, DAVID  10/22/22 2465

## 2022-10-22 NOTE — ED NOTES
Assumed care:  Shalini Donald is awake, alert and oriented x 3, skin warm and dry, in NAD.  Patient states that she took her last valsartan this morning and will not have any tomorrow and  office is closed.    Patient identifiers for Shalini Donald checked and correct.  LOC:  Shalini Donald is awake, alert, and aware of environment with an appropriate affect. She is oriented x 3 and speaking appropriately.  [place]  APPEARANCE:  She is resting comfortably and in no acute distress. She is clean and well groomed, patient's clothing is properly fastened.  SKIN:  The skin is warm and dry. She has normal skin turgor and moist mucus membranes. Skin is intact; no bruising or breakdown noted.  MUSCULOSKELETAL:  She is moving all extremities well, no obvious deformities noted. Pulses intact.   RESPIRATORY:  Airway is open and patent. Respirations are spontaneous and non-labored with normal effort and rate.  CARDIAC:  She has a normal rate and rhythm. No peripheral edema noted. Capillary refill < 3 seconds.  ABDOMEN:  No distention noted.  Soft and non-tender upon palpation.  NEUROLOGICAL:  PERRL. Facial expression is symmetrical. Hand grasps are equal bilaterally. Normal sensation in all extremities when touched with finger.  Allergies reported:    Review of patient's allergies indicates:   Allergen Reactions    Pcn [penicillins] Rash    Tylenol [acetaminophen] Other (See Comments)     headaches     OTHER NOTES:

## 2022-11-30 ENCOUNTER — HOSPITAL ENCOUNTER (EMERGENCY)
Facility: HOSPITAL | Age: 87
Discharge: HOME | End: 2022-11-30
Attending: EMERGENCY MEDICINE
Payer: MEDICARE

## 2022-11-30 VITALS
SYSTOLIC BLOOD PRESSURE: 166 MMHG | RESPIRATION RATE: 20 BRPM | WEIGHT: 119 LBS | OXYGEN SATURATION: 97 % | BODY MASS INDEX: 22.47 KG/M2 | HEIGHT: 61 IN | HEART RATE: 77 BPM | TEMPERATURE: 98 F | DIASTOLIC BLOOD PRESSURE: 76 MMHG

## 2022-11-30 DIAGNOSIS — S09.90XA CLOSED HEAD INJURY, INITIAL ENCOUNTER: ICD-10-CM
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
## 2022-11-30 LAB
ANION GAP SERPL CALC-SCNC: 10 MMOL/L (ref 8–16)
BASOPHILS # BLD AUTO: 0.04 K/UL (ref 0–0.2)
BASOPHILS NFR BLD: 0.4 % (ref 0–1.9)
BUN SERPL-MCNC: 17 MG/DL (ref 8–23)
CALCIUM SERPL-MCNC: 10.1 MG/DL (ref 8.7–10.5)
CHLORIDE SERPL-SCNC: 101 MMOL/L (ref 95–110)
CO2 SERPL-SCNC: 28 MMOL/L (ref 23–29)
CREAT SERPL-MCNC: 0.8 MG/DL (ref 0.5–1.4)
DIFFERENTIAL METHOD: ABNORMAL
EOSINOPHIL # BLD AUTO: 0.6 K/UL (ref 0–0.5)
EOSINOPHIL NFR BLD: 6.6 % (ref 0–8)
ERYTHROCYTE [DISTWIDTH] IN BLOOD BY AUTOMATED COUNT: 12.2 % (ref 11.5–14.5)
EST. GFR  (NO RACE VARIABLE): >60 ML/MIN/1.73 M^2
GLUCOSE SERPL-MCNC: 86 MG/DL (ref 70–110)
HCT VFR BLD AUTO: 38.6 % (ref 37–48.5)
HGB BLD-MCNC: 13.1 G/DL (ref 12–16)
IMM GRANULOCYTES # BLD AUTO: 0.02 K/UL (ref 0–0.04)
IMM GRANULOCYTES NFR BLD AUTO: 0.2 % (ref 0–0.5)
LYMPHOCYTES # BLD AUTO: 1.9 K/UL (ref 1–4.8)
LYMPHOCYTES NFR BLD: 20.3 % (ref 18–48)
MCH RBC QN AUTO: 32.1 PG (ref 27–31)
MCHC RBC AUTO-ENTMCNC: 33.9 G/DL (ref 32–36)
MCV RBC AUTO: 95 FL (ref 82–98)
MONOCYTES # BLD AUTO: 0.7 K/UL (ref 0.3–1)
MONOCYTES NFR BLD: 7.6 % (ref 4–15)
NEUTROPHILS # BLD AUTO: 6 K/UL (ref 1.8–7.7)
NEUTROPHILS NFR BLD: 64.9 % (ref 38–73)
NRBC BLD-RTO: 0 /100 WBC
PLATELET # BLD AUTO: 282 K/UL (ref 150–450)
PMV BLD AUTO: 9.3 FL (ref 9.2–12.9)
POTASSIUM SERPL-SCNC: 3.6 MMOL/L (ref 3.5–5.1)
RBC # BLD AUTO: 4.08 M/UL (ref 4–5.4)
SODIUM SERPL-SCNC: 139 MMOL/L (ref 136–145)
WBC # BLD AUTO: 9.21 K/UL (ref 3.9–12.7)

## 2022-11-30 PROCEDURE — 36415 COLL VENOUS BLD VENIPUNCTURE: CPT | Performed by: EMERGENCY MEDICINE

## 2022-11-30 PROCEDURE — 86803 HEPATITIS C AB TEST: CPT | Performed by: EMERGENCY MEDICINE

## 2022-11-30 PROCEDURE — 87389 HIV-1 AG W/HIV-1&-2 AB AG IA: CPT | Performed by: EMERGENCY MEDICINE

## 2022-11-30 PROCEDURE — 85025 COMPLETE CBC W/AUTO DIFF WBC: CPT | Performed by: EMERGENCY MEDICINE

## 2022-11-30 PROCEDURE — 90471 IMMUNIZATION ADMIN: CPT | Performed by: EMERGENCY MEDICINE

## 2022-11-30 PROCEDURE — 90715 TDAP VACCINE 7 YRS/> IM: CPT | Performed by: EMERGENCY MEDICINE

## 2022-11-30 PROCEDURE — 99285 EMERGENCY DEPT VISIT HI MDM: CPT | Mod: 25

## 2022-11-30 PROCEDURE — 63600175 PHARM REV CODE 636 W HCPCS: Performed by: EMERGENCY MEDICINE

## 2022-11-30 PROCEDURE — 80048 BASIC METABOLIC PNL TOTAL CA: CPT | Performed by: EMERGENCY MEDICINE

## 2022-11-30 RX ADMIN — TETANUS TOXOID, REDUCED DIPHTHERIA TOXOID AND ACELLULAR PERTUSSIS VACCINE, ADSORBED 0.5 ML: 5; 2.5; 8; 8; 2.5 SUSPENSION INTRAMUSCULAR at 01:11

## 2022-11-30 NOTE — DISCHARGE INSTRUCTIONS
There is an old meningioma noted on your head scan that has been present in the past and looks similarly.  Discuss with your doctor if further monitoring is necessary.

## 2022-11-30 NOTE — ED PROVIDER NOTES
Encounter Date: 11/30/2022    SCRIBE #1 NOTE: I, Zack Garcia, am scribing for, and in the presence of,  Mike Duque MD.     History     Chief Complaint   Patient presents with    Fall     Pt fell and hit head yesterday on car bumper, pt is unsure how the fall happened.  pt had a large hematoma to the R forehead area and bruising to both eyes pt denies hitting eyes on anything. Pt denies LOC. Pt c/o pain to lower back. Pt not on blood thinners. Pt is not alert and oriented with a GCS of 15      Time seen by provider: 12:17 PM on 11/30/2022    Shalini Donald is a 88 y.o. female with a PMHx of back pain, HTN, and arthritis who presents to the ED with an onset of bruising, as well as lower back and neck pain. This is a result of falling forward from a bent position about a day and a half ago. She hit her head and face on the bumper of the car. The patient says when she fell she could not get up right away and had to sit on the ground for a number of hours. She denies any LOC.  She was wearing glasses when she fell. The patient denies headache, loss of vision, chest pain, abdomen pain, burning during urination, fever, jaw ain or any other symptoms at this time. PSHx of cholecystectomy and knee replacement.       The history is provided by the patient.   Review of patient's allergies indicates:   Allergen Reactions    Pcn [penicillins] Rash    Tylenol [acetaminophen] Other (See Comments)     headaches     Past Medical History:   Diagnosis Date    Arthritis     Back pain     Hypertension     Wears glasses     Wears partial dentures     upper     Past Surgical History:   Procedure Laterality Date    CHOLECYSTECTOMY      EYE SURGERY Bilateral     cataracts    JOINT REPLACEMENT Right     knee    KNEE SURGERY       No family history on file.  Social History     Tobacco Use    Smoking status: Never    Smokeless tobacco: Never   Substance Use Topics    Alcohol use: No    Drug use: No     Review of Systems  No   Constitutional:  Negative for fever.   HENT:  Negative for sore throat.    Respiratory:  Negative for shortness of breath.    Cardiovascular:  Negative for chest pain.   Gastrointestinal:  Negative for nausea.   Genitourinary:  Negative for dysuria.   Musculoskeletal:  Positive for back pain and neck pain.   Skin:  Positive for color change. Negative for rash.        Positive for bruising.   Neurological:  Negative for weakness.   Hematological:  Does not bruise/bleed easily.     Physical Exam     Initial Vitals [11/30/22 1129]   BP Pulse Resp Temp SpO2   138/65 78 18 98.2 °F (36.8 °C) 96 %      MAP       --         Physical Exam    Nursing note and vitals reviewed.  Constitutional: She appears well-developed and well-nourished. She is not diaphoretic. No distress.   HENT:   Head: Normocephalic and atraumatic.   Mouth/Throat: Oropharynx is clear and moist.   Can rotate the neck 45 degrees in both directions. Small ecchymosis across bridge of the nose as well as periorbital ecchymosis. No bony tenderness to palpation. No trismus. No malocclusion.   Eyes: Conjunctivae are normal.   Neck: Neck supple.   No posterior midline tenderness to palpation.      Full passive range of motion without pain.     Cardiovascular:  Normal rate, regular rhythm, normal heart sounds and intact distal pulses.     Exam reveals no gallop and no friction rub.       No murmur heard.  Pulmonary/Chest: Breath sounds normal. She has no wheezes. She has no rhonchi. She has no rales.   Abdominal: Abdomen is soft. She exhibits no distension. There is no abdominal tenderness. There is no guarding.   Musculoskeletal:         General: Normal range of motion.      Cervical back: Full passive range of motion without pain and neck supple. No tenderness.      Thoracic back: No tenderness.      Lumbar back: No tenderness.      Comments: Small bruise to the left proximal fore arm and laceration to the left wrist. Full ROM. No tenderness to palpation. No  midline, C, T, or L tenderness.     Neurological: She is alert and oriented to person, place, and time.   Normal gait.  5/5 strength and sensation.  CN III through XII intact.     Skin: Capillary refill takes less than 2 seconds. No rash noted. No erythema.   Psychiatric: She has a normal mood and affect. Her speech is normal. Thought content normal.       ED Course   Procedures  Labs Reviewed   CBC W/ AUTO DIFFERENTIAL - Abnormal; Notable for the following components:       Result Value    MCH 32.1 (*)     Eos # 0.6 (*)     All other components within normal limits   BASIC METABOLIC PANEL   HIV 1 / 2 ANTIBODY   HEPATITIS C ANTIBODY          Imaging Results              CT Head Without Contrast (Final result)  Result time 11/30/22 13:21:35      Final result by Skyler Roman MD (11/30/22 13:21:35)                   Impression:      1. There is a large right frontal scalp hematoma.  There is no acute skull fracture.  2. There is no acute intracranial abnormality.  There is a known meningioma projecting superiorly from the left tentorium which is grossly unchanged in appearance compared to the prior MRI.  There is no intracranial hemorrhage or obvious acute infarction.  3. There is volume loss and nonspecific white matter change.  4. Atherosclerosis.      Electronically signed by: Skyler Roman MD  Date:    11/30/2022  Time:    13:21               Narrative:    EXAMINATION:  CT HEAD WITHOUT CONTRAST    CLINICAL HISTORY:  Head trauma, minor (Age >= 65y);    TECHNIQUE:  Routine unenhanced axial images were obtained through the head.  Sagittal and coronal reformatted images were created.  The study is reviewed in bone and soft tissue windows.    COMPARISON:  Head CT dated 04/02/2019, brain MRI dated 06/07/2018    FINDINGS:  Intracranial contents: There is no acute intracranial abnormality.  There is generalized volume loss with moderate nonspecific white matter change.  Redemonstrated is a meningioma along the  superior margin of the left tentorium measuring approximately 2.9 x 1.8 x 2.4 cm (AP, transverse, craniocaudad).  A small calcified lesion high in the posterior left parietal region measuring 8 mm could potentially represent a small calcified meningioma or simple dural calcification.  This was present previously and is unchanged.  There is no parenchymal hemorrhage or obvious acute infarction.  There is no abnormal extra-axial fluid collection.  There is no hydrocephalus or midline shift.    Extracranial contents, calvarium, soft tissues: There is bilateral facial swelling.  There is a large right frontal scalp hematoma without underlying fracture.  The calvarium is normal.  The nasal septum is deviated to the left.  There is a right-sided trey bullosa.  The included paranasal sinuses and mastoid air cells are clear.  There is atherosclerosis in the major vessels at the base of the brain.                                  (radiology reading, visualized by me)    The patient was informed of the incidental finding(s) as well as the need for PCP or specialist follow-up for reevaluation and possible further investigation or monitoring.       Medications   Tdap (BOOSTRIX) vaccine injection 0.5 mL (0.5 mLs Intramuscular Given 11/30/22 1300)     Medical Decision Making:   History:   Old Medical Records: I decided to obtain old medical records.  Clinical Tests:   Lab Tests: Ordered and Reviewed  Radiological Study: Ordered and Reviewed        Scribe Attestation:   Scribe #1: I performed the above scribed service and the documentation accurately describes the services I performed. I attest to the accuracy of the note.            I, Dr. Mike Duque, personally performed the services described in this documentation. All medical record entries made by the scribe were at my direction and in my presence.  I have reviewed the chart and agree that the record reflects my personal performance and is accurate and complete.  Mike Duque MD.  3:41 PM 11/30/2022    Shalini Donald is a 88 y.o. female presenting with closed head injury greater than 1 day prior.  There is no sign of significant facial fracture.  There is no globe injury indicated by evaluation.  Head CT performed without sign of acute process.  Old meningioma noted to be discussed on follow-up.  Mental status is normal here.  Negative criteria for neck imaging per NEXUS criteria.  No sign of other acute precipitating process requiring further ED workup or stabilization.  Follow-up with PCP.  Fall precautions and general return precautions reviewed.         Clinical Impression:   Final diagnoses:  [W19.XXXA] Fall, initial encounter (Primary)  [S09.90XA] Closed head injury, initial encounter      ED Disposition Condition    Discharge Stable          ED Prescriptions    None       Follow-up Information       Follow up With Specialties Details Why Contact Info    Kali Mccullough MD Internal Medicine In 1 week  [address] [place] [address] [facility] [address]  [phone]               Mike Duque MD  11/30/22 1545

## 2022-12-01 LAB
HCV AB SERPL QL IA: NORMAL
HIV 1+2 AB+HIV1 P24 AG SERPL QL IA: NORMAL

## 2022-12-10 ENCOUNTER — HOSPITAL ENCOUNTER (EMERGENCY)
Facility: HOSPITAL | Age: 87
Discharge: HOME | End: 2022-12-10
Attending: EMERGENCY MEDICINE
Payer: MEDICARE

## 2022-12-10 VITALS
SYSTOLIC BLOOD PRESSURE: 192 MMHG | BODY MASS INDEX: 22.47 KG/M2 | OXYGEN SATURATION: 100 % | HEART RATE: 80 BPM | WEIGHT: 119 LBS | HEIGHT: 61 IN | DIASTOLIC BLOOD PRESSURE: 85 MMHG | RESPIRATION RATE: 16 BRPM | TEMPERATURE: 98 F

## 2022-12-10 DIAGNOSIS — S09.90XD INJURY OF HEAD, SUBSEQUENT ENCOUNTER: Primary | ICD-10-CM
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
## 2022-12-10 PROCEDURE — 99284 EMERGENCY DEPT VISIT MOD MDM: CPT | Mod: 25

## 2022-12-10 NOTE — ED PROVIDER NOTES
"Encounter Date: 12/10/2022    SCRIBE #1 NOTE: I, Esteban Dias, am scribing for, and in the presence of,  Ada Leon NP.     History     Chief Complaint   Patient presents with    Fall    Head Injury     3 days ago  , states ' seen in this ER and not x-rayed "  golf ball sized hematoma to forehead      Time seen by provider: 10:36 AM on 12/10/2022    Shalini Donald is a 88 y.o. female who presents to the ED with a bruise to the forehead that happened last week after she fell on the 30th of last month. The patient is a poor historian. She was leaning over forward washing the bumper of her car, when she fell over on the 30th of last month.  She has been seen by this ED since the accident and had a CT done, but she returns today requesting an x-ray. She does not recall having imaging done at that time.  She does not remember any details of the fall.  She has not had a fall since then and she has had no new or worsening symptoms and feels she is improving since the fall. She does not recall details of the fall.  She states that the swelling on her head hurt at first, but has felt better since the initial fall. The patient denies neck pain, any other pain, or any other symptoms at this time. The patient takes aspirin. PMHx of HTN. There is no pertinent PSHx.      Review of patient's allergies indicates:   Allergen Reactions    Pcn [penicillins] Rash    Tylenol [acetaminophen] Other (See Comments)     headaches     Past Medical History:   Diagnosis Date    Arthritis     Back pain     Hypertension     Wears glasses     Wears partial dentures     upper     Past Surgical History:   Procedure Laterality Date    CHOLECYSTECTOMY      EYE SURGERY Bilateral     cataracts    JOINT REPLACEMENT Right     knee    KNEE SURGERY       No family history on file.  Social History     Tobacco Use    Smoking status: Never    Smokeless tobacco: Never   Substance Use Topics    Alcohol use: No    Drug use: No     Review of Systems "   Constitutional:  Negative for fever.   HENT:  Negative for sore throat.    Respiratory:  Negative for shortness of breath.    Cardiovascular:  Negative for chest pain.   Gastrointestinal:  Negative for nausea.   Genitourinary:  Negative for dysuria.   Musculoskeletal:  Negative for back pain, myalgias and neck pain.   Skin:  Positive for wound. Negative for rash.   Neurological:  Negative for weakness.   Hematological:  Bruises/bleeds easily.     Physical Exam     Initial Vitals [12/10/22 1030]   BP Pulse Resp Temp SpO2   (!) 192/85 83 18 98 °F (36.7 °C) 100 %      MAP       --         Physical Exam    Nursing note and vitals reviewed.  Constitutional: Vital signs are normal. She appears well-developed and well-nourished.   HENT:   Head: Normocephalic.   Right forehead hematoma.    Eyes: Pupils are equal, round, and reactive to light.   Neck: Neck supple.    Full passive range of motion without pain.     Cardiovascular:  Normal rate, regular rhythm, normal heart sounds and intact distal pulses.     Exam reveals no gallop and no friction rub.       No murmur heard.  Pulmonary/Chest: Breath sounds normal. She has no wheezes. She has no rhonchi. She has no rales.   Abdominal: Abdomen is soft. Bowel sounds are normal. There is no abdominal tenderness.   Musculoskeletal:      Cervical back: Full passive range of motion without pain and neck supple. No rigidity. No spinous process tenderness.     Neurological: She is alert and oriented to person, place, and time. She has normal strength.   Cranial nerves III through XII grossly intact. 5/5 strength with intact sensation to BUE's and BLE's. Gait normal.      Skin: Skin is warm, dry and intact.   Psychiatric: She has a normal mood and affect. Her speech is normal and behavior is normal.       ED Course   Procedures  Labs Reviewed - No data to display       Imaging Results              CT Head Without Contrast (Final result)  Result time 12/10/22 11:46:08      Final result  by Hudson Joseph Jr., MD (12/10/22 11:46:08)                   Impression:      Large but decreasing size right frontal scalp hematoma.  A cranial fracture is not seen.  Stable 3 cm meningioma of the left tentorium cerebellar Shila high protruding upward into the left occipital lobe.  Stable partially calcified 1 cm probable meningioma of the inner table of the left occipital vertex.  An intracranial hemorrhage is not seen.      Electronically signed by: Hudson Joseph MD  Date:    12/10/2022  Time:    11:46               Narrative:    EXAMINATION:  CT HEAD WITHOUT CONTRAST    CLINICAL HISTORY:  Head trauma, minor (Age >= 65y);    TECHNIQUE:  Low dose axial images were obtained through the head.  Coronal and sagittal reformations were also performed. Contrast was not administered.    COMPARISON:  CT head of November 30, 2022.    FINDINGS:  There remains a large right frontal scalp hematoma.  Is decreased in size from the prior study.  A cranial fracture is not identified in this area or elsewhere in the cranium.    The basal cisterns are clear and symmetric.  Midline shift is not identified.  The ventricles are mildly enlarged arteries are the cerebral sulci and sylvian fissures consistent with brain atrophy.  Hypodensity of the central white matter is consistent with deep white matter ischemic changes.  There remains a 3 cm meningioma of the left tentorium cerebella I protruding superiorly into the left occipital lobe unchanged from the prior study.  A 2nd probable partially calcified meningioma protrudes downward from the inner table of the vertex of the left parietooccipital lobe.  Reactive brain edema is not noted here.  On this study a hyper or hypo density within the brain parenchyma consistent with hemorrhage is not seen.  No free hemorrhage is noted.                                       Medications - No data to display  Medical Decision Making:   History:   Old Medical Records: I decided to obtain  old medical records.  Differential Diagnosis:   ICH  SDH  Hematoma  Clinical Tests:   Radiological Study: Reviewed and Ordered     APC / Resident Notes:   Patient is a 88 y.o. female who presents to the ED 12/10/2022 who underwent emergent evaluation for head injury that occurred on the 30th of last month. She is a poor historian. She does not recall any new injury or fall and has no new complaints with no focal neurological deficits. Given poor historian, age, and obvious head trauma a repeat head CT is performed without acute findings. I do not think any acute process such as skull fracture or other emergent process such as ICH or SDH. Chronic unchanged findings are discussed in detail with pt and given to her on her discharge paperwork to review with recommendations to follow up with neurology. She denies neck pain and has no midline C- spine tenderness with normal ROM of the neck without pain. She has 5/5 strength and normal sensation to bi upper and lower extremities with normal and steady gait. Discussed fall precautions with pt. Based on my clinical evaluation, I do not appreciate any immediate, emergent, or life threatening condition or etiology that warrants additional workup today and feel that the patient can be discharged with close follow up care. Case discussed with Dr. Sherman who is agreeable to plan of care. Follow up and return precautions discussed; patient verbalized understanding and is agreeable to plan of care. Patient discharged home in stable condition.              Scribe Attestation:   Scribe #1: I performed the above scribed service and the documentation accurately describes the services I performed. I attest to the accuracy of the note.    Attending Attestation:           Physician Attestation for Scribe:  Physician Attestation Statement for Scribe #1: I, Ada Leon, reviewed documentation, as scribed by in my presence, and it is both accurate and complete.     Comments: IAda  SUSAN Leon, personally performed the services described in this documentation. All medical record entries made by the scribe were at my direction and in my presence.  I have reviewed the chart and agree that the record reflects my personal performance and is accurate and complete. SUSAN Treviño.  11:53 AM 12/10/2022                     Clinical Impression:   Final diagnoses:  [S09.90XD] Injury of head, subsequent encounter (Primary)  [W19.XXXD] Fall, subsequent encounter      ED Disposition Condition    Discharge Stable          ED Prescriptions    None       Follow-up Information       Follow up With Specialties Details Why Contact Info    Nitesh Guerrier MD Vascular Neurology, Neurology In 1 week  [address]  [facility] [address]  [phone]      [facility] Emergency Dept Emergency Medicine  As needed, If symptoms worsen [address]  [facility] [address]  [phone]    Kali Mccullough MD Internal Medicine In 1 week  [address]  [facility] [address]  [phone]               Ada Leon NP  12/10/22 1225

## 2022-12-10 NOTE — DISCHARGE INSTRUCTIONS
Ct of head today results :    Large but decreasing size right frontal scalp hematoma.  A cranial fracture is not seen.  Stable 3 cm meningioma of the left tentorium cerebellar Shila high protruding upward into the left occipital lobe.  Stable partially calcified 1 cm probable meningioma of the inner table of the left occipital vertex.  An intracranial hemorrhage is not seen.    Follow up with your neurologist.

## 2023-06-27 ENCOUNTER — HOSPITAL ENCOUNTER (EMERGENCY)
Facility: HOSPITAL | Age: 88
Discharge: HOME | End: 2023-06-27
Attending: EMERGENCY MEDICINE
Payer: MEDICARE

## 2023-06-27 VITALS
OXYGEN SATURATION: 99 % | TEMPERATURE: 98 F | BODY MASS INDEX: 18.4 KG/M2 | DIASTOLIC BLOOD PRESSURE: 73 MMHG | SYSTOLIC BLOOD PRESSURE: 184 MMHG | WEIGHT: 100 LBS | HEIGHT: 62 IN | HEART RATE: 63 BPM | RESPIRATION RATE: 18 BRPM

## 2023-06-27 DIAGNOSIS — T63.484A LOCAL REACTION TO INSECT STING, UNDETERMINED INTENT, INITIAL ENCOUNTER: Primary | ICD-10-CM

## 2023-06-27 PROCEDURE — 99283 EMERGENCY DEPT VISIT LOW MDM: CPT

## 2023-06-27 RX ORDER — MUPIROCIN CALCIUM 20 MG/G
CREAM TOPICAL 3 TIMES DAILY
Qty: 30 G | Refills: 0 | Status: SHIPPED | OUTPATIENT
Start: 2023-06-27 | End: 2023-10-10

## 2023-06-27 NOTE — DISCHARGE INSTRUCTIONS
Take Zyrtec or Allegra for itching.  Apply Bactroban to the affected area 2-3 times daily.  Watch for any increasing redness, fever, red streak going up the leg or drainage.  Return to the ER as needed, otherwise follow up with your primary care physician.

## 2023-06-27 NOTE — ED PROVIDER NOTES
Encounter Date: 6/27/2023       History     Chief Complaint   Patient presents with    Insect Bite     Pt states she has multiple bites on her legs from an unknown insect.     88-year-old female who has a prior history of hypertension, who drove herself to the emergency room, complains of having infected rash over her left lateral calf.  Patient thinks she was bitten by insects and she admits to scratching the affected area but has been using alcohol topically but no antihistamines.  No fever or chills.  No thigh or inguinal pain.  No history any adenopathy.  She is not diabetic.  Tetanus status is up-to-date.  No nausea vomiting.  No other systemic symptoms.    Review of patient's allergies indicates:   Allergen Reactions    Pcn [penicillins] Rash    Tylenol [acetaminophen] Other (See Comments)     headaches     Past Medical History:   Diagnosis Date    Arthritis     Back pain     Hypertension     Wears glasses     Wears partial dentures     upper     Past Surgical History:   Procedure Laterality Date    CHOLECYSTECTOMY      EYE SURGERY Bilateral     cataracts    JOINT REPLACEMENT Right     knee    KNEE SURGERY       No family history on file.  Social History     Tobacco Use    Smoking status: Never    Smokeless tobacco: Never   Substance Use Topics    Alcohol use: No    Drug use: No     Review of Systems   Constitutional:  Negative for activity change, chills, diaphoresis and fever.   HENT: Negative.     Respiratory: Negative.     Cardiovascular: Negative.    Gastrointestinal: Negative.    Musculoskeletal:  Negative for myalgias.   Skin:  Positive for rash and wound. Negative for pallor.     Physical Exam     Initial Vitals   BP Pulse Resp Temp SpO2   06/27/23 0510 06/27/23 0508 06/27/23 0508 06/27/23 0508 06/27/23 0508   (!) 184/73 63 18 97.9 °F (36.6 °C) 99 %      MAP       --                Physical Exam    Vitals reviewed.  Constitutional: She appears well-developed and well-nourished. She is not diaphoretic.  "No distress.   HENT:   Head: Normocephalic and atraumatic.   Nose: Nose normal.   Eyes: Conjunctivae are normal.   Musculoskeletal:         General: No tenderness or edema. Normal range of motion.     Neurological: She is alert. GCS score is 15. GCS eye subscore is 4. GCS verbal subscore is 5. GCS motor subscore is 6.   Skin: Skin is warm and dry. Capillary refill takes less than 2 seconds. Rash noted. No abscess noted. There is erythema. No pallor.   Left lateral calf has some areas of excoriated papules with surrounding erythema but no significant cellulitis.  No evidence of lymphangitis.  Range of motion of the leg is normal.  Neurovascular status is intact.       ED Course   Procedures  Labs Reviewed - No data to display       Imaging Results    None          Medications - No data to display             Attending Attestation:             Attending ED Notes:   This 80-year-old presented with excoriated bites or her left lateral calf which can be treated symptomatically as an outpatient with topical mupirocin and use of antihistamines.  Patient did comment that she cut the affected area to remove a suspected "bug" and was advised to avoid such behavior in the future.                 Clinical Impression:   Final diagnoses:  [T63.484A] Local reaction to insect sting, undetermined intent, initial encounter (Primary)        ED Disposition Condition    Discharge Stable          ED Prescriptions       Medication Sig Dispense Start Date End Date Auth. Provider    mupirocin calcium 2% (BACTROBAN) 2 % cream Apply topically 3 (three) times daily. 30 g 6/27/2023 -- Max Baptiste Jr., MD          Follow-up Information       Follow up With Specialties Details Why Contact Info    Kali Mccullough MD Internal Medicine   [address] [place] [address] [address]  [facility] [address]  [phone]               Max Baptiste Jr., MD  06/27/23 0676    "

## 2023-06-29 ENCOUNTER — HOSPITAL ENCOUNTER (EMERGENCY)
Facility: HOSPITAL | Age: 88
Discharge: HOME | End: 2023-06-29
Attending: EMERGENCY MEDICINE
Payer: MEDICARE

## 2023-06-29 VITALS
BODY MASS INDEX: 18.88 KG/M2 | HEART RATE: 76 BPM | TEMPERATURE: 98 F | OXYGEN SATURATION: 97 % | RESPIRATION RATE: 18 BRPM | WEIGHT: 100 LBS | DIASTOLIC BLOOD PRESSURE: 72 MMHG | HEIGHT: 61 IN | SYSTOLIC BLOOD PRESSURE: 166 MMHG
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
## 2023-06-29 PROCEDURE — 25000003 PHARM REV CODE 250: Performed by: PHYSICIAN ASSISTANT

## 2023-06-29 PROCEDURE — 99283 EMERGENCY DEPT VISIT LOW MDM: CPT

## 2023-06-29 RX ORDER — TRIAMCINOLONE ACETONIDE 1 MG/G
CREAM TOPICAL ONCE
Status: COMPLETED | OUTPATIENT
Start: 2023-06-29 | End: 2023-06-29

## 2023-06-29 RX ORDER — TRIAMCINOLONE ACETONIDE 1 MG/G
OINTMENT TOPICAL 2 TIMES DAILY
Qty: 30 G | Refills: 0 | Status: SHIPPED | OUTPATIENT
Start: 2023-06-29 | End: 2023-07-06

## 2023-06-29 RX ADMIN — TRIAMCINOLONE ACETONIDE: 1 CREAM TOPICAL at 05:06

## 2023-06-29 NOTE — ED PROVIDER NOTES
Encounter Date: 6/29/2023    SCRIBE #1 NOTE: I, Marlene Marr, am scribing for, and in the presence of,  Earlene Lua PA-C.     History     Chief Complaint   Patient presents with    Insect Bite     To bilateral lower extremities x 1 week     Time seen by provider: 4:50 PM on 06/29/2023    Shalini Donald is a 88 y.o. female who presents to the ED with an onset of itchy bite wounds on her BLE since 1 week ago. Pt expresses thoughts for it being insect bites as she has been walking through her yard. Pt reports that she has applied hydrocortisone cream and alcohol on it with little relief. The patient denies any other symptoms at this time. PMHx of arthritis and HTN. PSHx of cholecystectomy and R knee replacement.      The history is provided by the patient.   Review of patient's allergies indicates:   Allergen Reactions    Pcn [penicillins] Rash    Tylenol [acetaminophen] Other (See Comments)     headaches     Past Medical History:   Diagnosis Date    Arthritis     Back pain     Hypertension     Wears glasses     Wears partial dentures     upper     Past Surgical History:   Procedure Laterality Date    CHOLECYSTECTOMY      EYE SURGERY Bilateral     cataracts    JOINT REPLACEMENT Right     knee    KNEE SURGERY       No family history on file.  Social History     Tobacco Use    Smoking status: Never    Smokeless tobacco: Never   Substance Use Topics    Alcohol use: No    Drug use: No     Review of Systems   Constitutional:  Negative for chills and fever.   Musculoskeletal:  Negative for arthralgias, back pain, joint swelling, myalgias, neck pain and neck stiffness.   Skin:  Positive for wound (BLE). Negative for color change, pallor and rash.   Neurological:  Negative for weakness and numbness.   Hematological:  Does not bruise/bleed easily.     Physical Exam     Initial Vitals [06/29/23 1634]   BP Pulse Resp Temp SpO2   (!) 143/67 79 20 98 °F (36.7 °C) 95 %      MAP       --         Physical Exam    Nursing note  and vitals reviewed.  Constitutional: She appears well-developed and well-nourished. She is not diaphoretic. No distress.   HENT:   Head: Normocephalic and atraumatic.   Cardiovascular:  Intact distal pulses.           Musculoskeletal:         General: No tenderness. Normal range of motion.     Neurological: She is alert and oriented to person, place, and time. She has normal strength. No sensory deficit.   Skin: Skin is warm and dry. No rash and no abscess noted. No erythema.   Multiple, healing insect bites noted bilateral lower extremities without induration, erythema, bleeding or discharge.     Psychiatric: She has a normal mood and affect.       ED Course   Procedures  Labs Reviewed - No data to display         Imaging Results    None          Medications   triamcinolone acetonide 0.1% cream ( Topical (Top) Given 6/29/23 1741)     Medical Decision Making:   History:   Old Medical Records: I decided to obtain old medical records.  Old Records Summarized: records from clinic visits and records from previous admission(s).  Differential Diagnosis:   Insect bite   Abscess  Cellulitis   Folliculitis     Clinical Tests:   Lab Tests: Ordered and Reviewed  ED Management:  Pt emergently evaluated here in the ED.   Symptoms consistent with healing/resolving insect bites to bilateral lower extremities.  No evidence for abscess or cellulitis.  No need for oral antibiotics at this time.  Will treat with topical corticosteroid cream and discharge home to follow-up with her PCP for re-evaluation as needed.  Patient voices understanding and is agreeable to the plan.  Specific return precautions are given.           Scribe Attestation:   Scribe #1: I performed the above scribed service and the documentation accurately describes the services I performed. I attest to the accuracy of the note.                 I, Earlene Lua PA-C, personally performed the services described in this documentation. All medical record entries  made by the scribe were at my direction and in my presence.  I have reviewed the chart and agree that the record reflects my personal performance and is accurate and complete. Earlene Lua PA-C.  5:41 PM 06/29/2023    Clinical Impression:   Final diagnoses:  [W57.XXXA] Multiple insect bites (Primary)        ED Disposition Condition    Discharge Stable          ED Prescriptions       Medication Sig Dispense Start Date End Date Auth. Provider    triamcinolone acetonide 0.1% (KENALOG) 0.1 % ointment Apply topically 2 (two) times daily. for 7 days 30 g 6/29/2023 7/6/2023 Earlene Lua PA-C          Follow-up Information       Follow up With Specialties Details Why Contact Info    [facility] Emergency Dept Emergency Medicine  As needed, If symptoms worsen [address] [place] [id number] [phone]    Kali Mccullough MD Internal Medicine  As needed [address] [place] [address] Dr [address] [place] [id number]  [phone]               Earlene Lua PA-C  06/29/23 4698

## 2023-07-29 ENCOUNTER — HOSPITAL ENCOUNTER (EMERGENCY)
Facility: HOSPITAL | Age: 88
Discharge: HOME | End: 2023-07-29
Attending: EMERGENCY MEDICINE
Payer: MEDICARE

## 2023-07-29 VITALS
DIASTOLIC BLOOD PRESSURE: 89 MMHG | BODY MASS INDEX: 18.88 KG/M2 | HEART RATE: 87 BPM | SYSTOLIC BLOOD PRESSURE: 148 MMHG | TEMPERATURE: 98 F | HEIGHT: 61 IN | RESPIRATION RATE: 20 BRPM | OXYGEN SATURATION: 96 % | WEIGHT: 100 LBS

## 2023-07-29 DIAGNOSIS — R41.82 AMS (ALTERED MENTAL STATUS): ICD-10-CM

## 2023-07-29 DIAGNOSIS — R41.0 CONFUSION: Primary | ICD-10-CM

## 2023-07-29 LAB
ALBUMIN SERPL BCP-MCNC: 4.2 G/DL (ref 3.5–5.2)
ALP SERPL-CCNC: 64 U/L (ref 55–135)
ALT SERPL W/O P-5'-P-CCNC: 21 U/L (ref 10–44)
ANION GAP SERPL CALC-SCNC: 10 MMOL/L (ref 8–16)
AST SERPL-CCNC: 26 U/L (ref 10–40)
BASOPHILS # BLD AUTO: 0.05 K/UL (ref 0–0.2)
BASOPHILS NFR BLD: 0.6 % (ref 0–1.9)
BILIRUB SERPL-MCNC: 0.8 MG/DL (ref 0.1–1)
BUN SERPL-MCNC: 20 MG/DL (ref 8–23)
CALCIUM SERPL-MCNC: 10.1 MG/DL (ref 8.7–10.5)
CHLORIDE SERPL-SCNC: 104 MMOL/L (ref 95–110)
CO2 SERPL-SCNC: 26 MMOL/L (ref 23–29)
CREAT SERPL-MCNC: 0.9 MG/DL (ref 0.5–1.4)
DIFFERENTIAL METHOD: ABNORMAL
EOSINOPHIL # BLD AUTO: 1.3 K/UL (ref 0–0.5)
EOSINOPHIL NFR BLD: 16 % (ref 0–8)
ERYTHROCYTE [DISTWIDTH] IN BLOOD BY AUTOMATED COUNT: 12.2 % (ref 11.5–14.5)
EST. GFR  (NO RACE VARIABLE): >60 ML/MIN/1.73 M^2
GLUCOSE SERPL-MCNC: 111 MG/DL (ref 70–110)
HCT VFR BLD AUTO: 41.2 % (ref 37–48.5)
HGB BLD-MCNC: 13.6 G/DL (ref 12–16)
IMM GRANULOCYTES # BLD AUTO: 0.02 K/UL (ref 0–0.04)
IMM GRANULOCYTES NFR BLD AUTO: 0.2 % (ref 0–0.5)
LYMPHOCYTES # BLD AUTO: 2.1 K/UL (ref 1–4.8)
LYMPHOCYTES NFR BLD: 26.3 % (ref 18–48)
MCH RBC QN AUTO: 31.3 PG (ref 27–31)
MCHC RBC AUTO-ENTMCNC: 33 G/DL (ref 32–36)
MCV RBC AUTO: 95 FL (ref 82–98)
MONOCYTES # BLD AUTO: 0.6 K/UL (ref 0.3–1)
MONOCYTES NFR BLD: 7.9 % (ref 4–15)
NEUTROPHILS # BLD AUTO: 3.9 K/UL (ref 1.8–7.7)
NEUTROPHILS NFR BLD: 49 % (ref 38–73)
NRBC BLD-RTO: 0 /100 WBC
PLATELET # BLD AUTO: 302 K/UL (ref 150–450)
PMV BLD AUTO: 9.4 FL (ref 9.2–12.9)
POCT GLUCOSE: 90 MG/DL (ref 70–110)
POTASSIUM SERPL-SCNC: 4.1 MMOL/L (ref 3.5–5.1)
PROT SERPL-MCNC: 7.5 G/DL (ref 6–8.4)
RBC # BLD AUTO: 4.35 M/UL (ref 4–5.4)
SODIUM SERPL-SCNC: 140 MMOL/L (ref 136–145)
TROPONIN I SERPL DL<=0.01 NG/ML-MCNC: 0.01 NG/ML (ref 0–0.03)
WBC # BLD AUTO: 8.01 K/UL (ref 3.9–12.7)

## 2023-07-29 PROCEDURE — 84484 ASSAY OF TROPONIN QUANT: CPT | Performed by: NURSE PRACTITIONER

## 2023-07-29 PROCEDURE — 80053 COMPREHEN METABOLIC PANEL: CPT | Performed by: NURSE PRACTITIONER

## 2023-07-29 PROCEDURE — 36415 COLL VENOUS BLD VENIPUNCTURE: CPT | Performed by: NURSE PRACTITIONER

## 2023-07-29 PROCEDURE — 85025 COMPLETE CBC W/AUTO DIFF WBC: CPT | Performed by: NURSE PRACTITIONER

## 2023-07-29 PROCEDURE — 82962 GLUCOSE BLOOD TEST: CPT

## 2023-07-29 PROCEDURE — 99285 EMERGENCY DEPT VISIT HI MDM: CPT | Mod: 25

## 2023-07-29 NOTE — ED PROVIDER NOTES
Encounter Date: 7/29/2023       History     Chief Complaint   Patient presents with    Altered Mental Status     Mild confusion     88-year-old female with a past medical history of arthritis and hypertension presents with a change in mental status.  The patient's brother reports that he was called by the patient's neighbor to come see the patient, as the patient had locked herself out and could not find her keys.  She then walked over to the neighbor's and was more confused than normal.  The patient's brother is concerned that the patient has had heat exposure today.  The patient's father reports that the patient has been having increasing confusion for at least the last year, and has been losing multiple keys to her house/car, that he has been replacing.  The patient has not been driving as reported by the brother.  They have not seen anyone for this confusion until today.  The patient denies any chest pain, abdominal pain, nausea/vomiting, trouble breathing, hematuria, or dysuria.  The patient reports that she feels back to normal and would like to go home.      Review of patient's allergies indicates:   Allergen Reactions    Pcn [penicillins] Rash    Tylenol [acetaminophen] Other (See Comments)     headaches     Past Medical History:   Diagnosis Date    Arthritis     Back pain     Hypertension     Wears glasses     Wears partial dentures     upper     Past Surgical History:   Procedure Laterality Date    CHOLECYSTECTOMY      EYE SURGERY Bilateral     cataracts    JOINT REPLACEMENT Right     knee    KNEE SURGERY       No family history on file.  Social History     Tobacco Use    Smoking status: Never    Smokeless tobacco: Never   Substance Use Topics    Alcohol use: No    Drug use: No     Review of Systems   Constitutional:  Negative for chills and fever.   HENT:  Negative for congestion.    Respiratory:  Negative for cough and shortness of breath.    Cardiovascular:  Negative for chest pain.   Gastrointestinal:   Negative for abdominal pain, nausea and vomiting.   Genitourinary:  Negative for dysuria.   Musculoskeletal:  Negative for gait problem.   Skin:  Negative for color change.   Neurological:  Negative for dizziness and numbness.   Psychiatric/Behavioral:  Positive for confusion. Negative for agitation.        Physical Exam     Initial Vitals [07/29/23 1541]   BP Pulse Resp Temp SpO2   (!) 148/89 87 20 97.5 °F (36.4 °C) 96 %      MAP       --         Physical Exam    Nursing note and vitals reviewed.  Constitutional: She appears well-developed and well-nourished.   HENT:   Head: Atraumatic.   Eyes: EOM are normal. Pupils are equal, round, and reactive to light.   Neck:   Normal range of motion.  Cardiovascular:  Normal rate and regular rhythm.           Pulmonary/Chest: Breath sounds normal.   Abdominal: Abdomen is soft. Bowel sounds are normal. She exhibits no distension. There is no abdominal tenderness. There is no rebound and no guarding.   Musculoskeletal:         General: Normal range of motion.      Right shoulder: Normal.      Left shoulder: Normal.      Cervical back: Normal range of motion.     Neurological: She is alert and oriented to person, place, and time. She has normal strength. No cranial nerve deficit.   Mildly confused.   Skin: Skin is warm and dry.   Psychiatric: She has a normal mood and affect.         ED Course   Procedures  Labs Reviewed   CBC W/ AUTO DIFFERENTIAL - Abnormal; Notable for the following components:       Result Value    MCH 31.3 (*)     Eos # 1.3 (*)     Eosinophil % 16.0 (*)     All other components within normal limits   COMPREHENSIVE METABOLIC PANEL - Abnormal; Notable for the following components:    Glucose 111 (*)     All other components within normal limits   TROPONIN I   POCT GLUCOSE   POCT GLUCOSE MONITORING CONTINUOUS          Imaging Results              CT Head Without Contrast (Final result)  Result time 07/29/23 16:09:11      Final result by Bia Morillo MD  (07/29/23 16:09:11)                   Impression:      No acute intracranial abnormality.    Chronic microangiopathic ischemic change volume loss.    Stable left cerebellar tentorium mid lung.      Electronically signed by: Bia Morillo  Date:    07/29/2023  Time:    16:09               Narrative:    EXAMINATION:  CT HEAD WITHOUT CONTRAST    CLINICAL HISTORY:  Mental status change, unknown cause;    TECHNIQUE:  Low dose axial images were obtained through the head.  Coronal and sagittal reformations were also performed. Contrast was not administered.    COMPARISON:  Multiple priors, most recent 12/10/2022    FINDINGS:  Soft tissues are unremarkable.  The mastoid air cells and paranasal sinuses are predominantly clear.  No acute calvarial abnormality.    No intracranial hemorrhage or extra-axial fluid collection. 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  No associated mass effect.  Mild ventriculomegaly, unchanged and likely related to compensatory volume loss.  No hydrocephalus.  Periventricular white matter hypoattenuation although nonspecific most in keeping with chronic microangiopathic ischemic change.  No evidence of new intracranial mass.  Large territory acute infarct.                                       Medications - No data to display  Medical Decision Making:   Initial Assessment:   88-year-old female presented for confusion.  Differential Diagnosis:   Initial differential diagnosis included but not limited to intracranial hemorrhage, dehydration, and acute kidney injury.  Clinical Tests:   Lab Tests: Ordered and Reviewed  Radiological Study: Ordered and Reviewed  ED Management:  The patient was emergently evaluated in the emergency department, her evaluation was significant for an elderly female who is mildly confused.  This is the patient's baseline as reported by the patient's brother.  The patient's labs showed no acute abnormalities.  There was no evidence of significant  heat exposure.  The patient's CT scan shows no acute processes or hemorrhages per Radiology.  The patient feels back to normal and would like to go home.  The patient has not given a urine sample for specimen testing here, however since this was just an acute episode I doubt an acute UTI.  The patient does not want to give a urine specimen as well.  The etiology of her confusion could be dementia.  The pension and her brother are encouraged to follow up with her PCP as an outpatient for further workup.  The patient is stable for discharge to home.  I do not believe that she needs any further workup at this time.                          Clinical Impression:   Final diagnoses:  [R41.82] AMS (altered mental status)  [R41.0] Confusion (Primary)        ED Disposition Condition    Discharge Stable          ED Prescriptions    None       Follow-up Information       Follow up With Specialties Details Why Contact Info    Kali Mccullough MD Internal Medicine Schedule an appointment as soon as possible for a visit   [address] [place] [address] [facility] [address]  [phone]               Harman Sanchez MD  07/29/23 7680       Harman Sanchez MD  07/29/23 7368

## 2023-07-29 NOTE — FIRST PROVIDER EVALUATION
" Emergency Department TeleTriage Encounter Note      CHIEF COMPLAINT    Chief Complaint   Patient presents with    Altered Mental Status     Mild confusion       VITAL SIGNS   Initial Vitals [07/29/23 1541]   BP Pulse Resp Temp SpO2   (!) 148/89 87 20 97.5 °F (36.4 °C) 96 %      MAP       --            ALLERGIES    Review of patient's allergies indicates:   Allergen Reactions    Pcn [penicillins] Rash    Tylenol [acetaminophen] Other (See Comments)     headaches       PROVIDER TRIAGE NOTE  This is a teletriage evaluation of a 88 y.o. female presenting to the ED complaining of confusion.  Pt states, "I just don't feel well."  Denies pain. No n/v/d.  Symptoms started today. No known trauma.    Initial orders will be placed and care will be transferred to an alternate provider when patient is roomed for a full evaluation. Any additional orders and the final disposition will be determined by that provider.         ORDERS  Labs Reviewed   CBC W/ AUTO DIFFERENTIAL   COMPREHENSIVE METABOLIC PANEL   TROPONIN I   URINALYSIS, REFLEX TO URINE CULTURE   POCT GLUCOSE MONITORING CONTINUOUS       ED Orders (720h ago, onward)      Start Ordered     Status Ordering Provider    07/29/23 1550 07/29/23 1549  POCT glucose  Once         Ordered INDIO COLINDRES N.    07/29/23 1550 07/29/23 1549  Cardiac Monitoring - Adult  Continuous        Comments: Notify Physician If:    Ordered INDIO COLINDRES N.    07/29/23 1550 07/29/23 1549  Pulse Oximetry Continuous  Continuous         Ordered INDIO COLINDRES N.    07/29/23 1550 07/29/23 1549  CBC auto differential  STAT         Pending Collection INDIO COLINDRES N.    07/29/23 1550 07/29/23 1549  Comprehensive metabolic panel  STAT         Pending Collection INDIO COLINDRES N.    07/29/23 1550 07/29/23 1549  Troponin I  STAT         Pending Collection INDIO COLINDRES N.    07/29/23 1550 07/29/23 1549  Urinalysis, Reflex to Urine Culture Urine, Clean Catch "  STAT         Ordered INDIO COLINDRES N.    07/29/23 1550 07/29/23 1550  CT Head Without Contrast  1 time imaging         Ordered INDIO COLINDRES N.    07/29/23 1549 07/29/23 1549  Insert Saline lock IV  Once         Ordered GEETAANDERSONINDIO N.    07/29/23 1549 07/29/23 1549  EKG 12-lead  Once         Ordered BERNADINE INDIOMIMA LAZO              Virtual Visit Note: The provider triage portion of this emergency department evaluation and documentation was performed via [employer], a HIPAA-compliant telemedicine application, in concert with a tele-presenter in the room. A face to face patient evaluation with one of my colleagues will occur once the patient is placed in an emergency department room.      DISCLAIMER: This note was prepared with [employer] voice recognition transcription software. Garbled syntax, mangled pronouns, and other bizarre constructions may be attributed to that software system.

## 2023-08-05 ENCOUNTER — HOSPITAL ENCOUNTER (EMERGENCY)
Facility: HOSPITAL | Age: 88
Discharge: HOME | End: 2023-08-05
Attending: EMERGENCY MEDICINE
Payer: MEDICARE

## 2023-08-05 VITALS
HEIGHT: 61 IN | WEIGHT: 100 LBS | OXYGEN SATURATION: 98 % | RESPIRATION RATE: 16 BRPM | TEMPERATURE: 98 F | SYSTOLIC BLOOD PRESSURE: 159 MMHG | HEART RATE: 75 BPM | DIASTOLIC BLOOD PRESSURE: 84 MMHG | BODY MASS INDEX: 18.88 KG/M2
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
## 2023-08-05 PROCEDURE — 25000003 PHARM REV CODE 250: Performed by: STUDENT IN AN ORGANIZED HEALTH CARE EDUCATION/TRAINING PROGRAM

## 2023-08-05 PROCEDURE — 99283 EMERGENCY DEPT VISIT LOW MDM: CPT

## 2023-08-05 RX ORDER — HYDROCORTISONE 1 %
CREAM (GRAM) TOPICAL
Qty: 30 G | Refills: 0 | Status: SHIPPED | OUTPATIENT
Start: 2023-08-05 | End: 2023-10-10

## 2023-08-05 RX ADMIN — Medication: at 07:08

## 2023-08-06 NOTE — ED PROVIDER NOTES
Encounter Date: 8/5/2023       History     Chief Complaint   Patient presents with    Insect Bite     TO BILAT LEGS     HPI    Shalini Donald is an 88 year old female presenting to the ED with a chief concern of pruritus secondary to bug bites.  Patient states that she woke up this morning in killed a black bug with no mouth or eyes.  She is itching ever since.  Denies fevers, chills, or leg swelling.    Review of patient's allergies indicates:   Allergen Reactions    Pcn [penicillins] Rash    Tylenol [acetaminophen] Other (See Comments)     headaches     Past Medical History:   Diagnosis Date    Arthritis     Back pain     Hypertension     Wears glasses     Wears partial dentures     upper     Past Surgical History:   Procedure Laterality Date    CHOLECYSTECTOMY      EYE SURGERY Bilateral     cataracts    JOINT REPLACEMENT Right     knee    KNEE SURGERY       No family history on file.  Social History     Tobacco Use    Smoking status: Never    Smokeless tobacco: Never   Substance Use Topics    Alcohol use: No    Drug use: No     Review of Systems   Constitutional:  Negative for chills and fever.   Cardiovascular:  Negative for leg swelling.   Skin:  Positive for color change and rash. Negative for wound.       Physical Exam     Initial Vitals [08/05/23 1843]   BP Pulse Resp Temp SpO2   (!) 177/82 70 16 98.3 °F (36.8 °C) 97 %      MAP       --         Physical Exam    Nursing note and vitals reviewed.  Constitutional: She appears well-developed and well-nourished.   HENT:   Head: Normocephalic and atraumatic.   Eyes: Conjunctivae are normal.   Neck: Neck supple.   Normal range of motion.  Musculoskeletal:      Cervical back: Normal range of motion and neck supple.     Skin: Skin is warm and dry. Rash noted.   Small papular areas of erythema about 3-4 relatively linear bites   Psychiatric: She has a normal mood and affect.         ED Course   Procedures  Labs Reviewed - No data to display       Imaging Results     None          Medications   diphenhydrAMINE-zinc acetate 1-0.1% cream ( Topical (Top) Given 8/1935)     Medical Decision Making:   Initial Assessment:   88-year-old female who presents to the ED with a chief concern bug bites.  Differential Diagnosis:   Ddx includes but is not limited to bed bug bites, self inflicted excoriations, scabies, contact dermatitits  ED Management:  After speaking with the patient, she reports seeing a black bug without a mouth or face when she woke up in the morning.  This likely a bedbug bite.  I discussed with the patient in addition to taking measures to clean her house.  Patient provided with Benadryl cream and hydrocortisone cream.  Discussed return precautions in addition to fever/other symptoms of cellulitis. She is stable for discharge at this time.    Susana Paniagua DO  [facility] PGY-3                            Clinical Impression:   Final diagnoses:  [W57.XXXA] Bedbug bite, initial encounter (Primary)        ED Disposition Condition    Discharge Stable          ED Prescriptions       Medication Sig Dispense Start Date End Date Auth. Provider    hydrocortisone 1 % cream Apply to affected area 2 times daily 30 g 8/5/2023 -- Susana Paniagua DO          Follow-up Information       Follow up With Specialties Details Why Contact Info Additional Information    Replaced by [facility] - Emergency Dept Emergency Medicine Go to  As needed, If symptoms worsen. [facility] [address]  [phone] 1st floor    Kali Mccullough MD Internal Medicine Schedule an appointment as soon as possible for a visit  For follow up [address] [place] [address] Dr Dempsey  [facility] [address]  [phone]                Susana Paniagua DO  Resident  08/05/23 4132

## 2023-08-06 NOTE — DISCHARGE INSTRUCTIONS
You have been evaluated in the Emergency Department today for a bug bite. Please keep the area surrounding the wounds clean and dry and watch closely for signs of infection.    Please use the benadryl cream to help with itching. You have also calin prescribed hydrocortisone cream and may alternate those.    Please follow up with your primary care physician within two days.    Return to the Emergency Department if you experience worsening or uncontrolled pain, spreading redness, fevers 100.4° or greater, pus from your bite, or for any other concerning symptoms.    Thank you for choosing us for your care.

## 2023-08-13 ENCOUNTER — HOSPITAL ENCOUNTER (EMERGENCY)
Facility: HOSPITAL | Age: 88
Discharge: HOME | End: 2023-08-13
Attending: STUDENT IN AN ORGANIZED HEALTH CARE EDUCATION/TRAINING PROGRAM
Payer: MEDICARE

## 2023-08-13 VITALS
HEIGHT: 61 IN | RESPIRATION RATE: 20 BRPM | SYSTOLIC BLOOD PRESSURE: 171 MMHG | BODY MASS INDEX: 18.88 KG/M2 | WEIGHT: 100 LBS | OXYGEN SATURATION: 94 % | DIASTOLIC BLOOD PRESSURE: 82 MMHG | HEART RATE: 72 BPM | TEMPERATURE: 98 F

## 2023-08-13 DIAGNOSIS — L29.9 ITCHING: ICD-10-CM
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
## 2023-08-13 DIAGNOSIS — S80.862A INSECT BITE OF LEFT LOWER LEG, INITIAL ENCOUNTER: Primary | ICD-10-CM

## 2023-08-13 PROCEDURE — 99283 EMERGENCY DEPT VISIT LOW MDM: CPT

## 2023-08-13 PROCEDURE — 63600175 PHARM REV CODE 636 W HCPCS: Performed by: STUDENT IN AN ORGANIZED HEALTH CARE EDUCATION/TRAINING PROGRAM

## 2023-08-13 RX ORDER — DEXAMETHASONE 4 MG/1
4 TABLET ORAL
Status: COMPLETED | OUTPATIENT
Start: 2023-08-13 | End: 2023-08-13

## 2023-08-13 RX ADMIN — DEXAMETHASONE 4 MG: 4 TABLET ORAL at 10:08

## 2023-08-13 NOTE — DISCHARGE INSTRUCTIONS
Follow up primary care physician within 1 week for repeat evaluation.  Return to ED sooner for worsening symptoms.

## 2023-08-13 NOTE — ED PROVIDER NOTES
Encounter Date: 8/13/2023       History     Chief Complaint   Patient presents with    Insect Bite     Itching legs x 1 week      88-year-old female presents with bilateral lower extremity itching after bug bite.  Onset for the last week, no significant alleviating factors.  No associated difficulty breathing or syncopal symptoms or nausea or vomiting.  No trauma, fever or chills    The history is provided by the patient.     Review of patient's allergies indicates:   Allergen Reactions    Pcn [penicillins] Rash    Tylenol [acetaminophen] Other (See Comments)     headaches     Past Medical History:   Diagnosis Date    Arthritis     Back pain     Hypertension     Wears glasses     Wears partial dentures     upper     Past Surgical History:   Procedure Laterality Date    CHOLECYSTECTOMY      EYE SURGERY Bilateral     cataracts    JOINT REPLACEMENT Right     knee    KNEE SURGERY       No family history on file.  Social History     Tobacco Use    Smoking status: Never    Smokeless tobacco: Never   Substance Use Topics    Alcohol use: No    Drug use: No     Review of Systems   Constitutional:  Negative for fever.   HENT:  Negative for sore throat.    Respiratory:  Negative for shortness of breath.    Cardiovascular:  Negative for chest pain.   Gastrointestinal:  Negative for nausea.   Genitourinary:  Negative for dysuria.   Musculoskeletal:  Negative for back pain.   Skin:  Negative for rash.   Neurological:  Negative for weakness.   Hematological:  Does not bruise/bleed easily.       Physical Exam     Initial Vitals [08/13/23 0916]   BP Pulse Resp Temp SpO2   (!) 171/82 72 20 98 °F (36.7 °C) (!) 94 %      MAP       --         Physical Exam    Nursing note and vitals reviewed.  Constitutional: She appears well-developed and well-nourished.   HENT:   Head: Normocephalic and atraumatic.   Eyes: EOM are normal. Right eye exhibits no discharge. Left eye exhibits no discharge.   Neck:   Normal range of  motion.  Cardiovascular:  Normal rate and regular rhythm.           Pulmonary/Chest: Effort normal. No stridor. No respiratory distress.   No accessory muscle usage or significant hypoxemia   Abdominal: Abdomen is soft. There is no abdominal tenderness.   Musculoskeletal:         General: No edema. Normal range of motion.      Cervical back: Normal range of motion.     Neurological: She is alert.   No focal motor or sensory deficit noted   Skin:   Multiple areas of skin breakdown bilateral lower extremities but no surrounding erythema purulence crepitus or fluctuance.  No vesicles or bullae   Psychiatric:   Not anxious or agitated         ED Course   Procedures  Labs Reviewed - No data to display       Imaging Results    None          Medications   dexAMETHasone tablet 4 mg (4 mg Oral Given 8/13/23 1001)     Medical Decision Making:   Initial Assessment:   88-year-old female presents with bug bite and associated itching  Differential Diagnosis:   Looks like a local dermatologic allergic reaction, no suspicion for any anaphylaxis or any superimposed bacterial or viral infection requiring additional medication.  ED Management:  We will administer oral dose of steroids for supportive care.  Patient voiced understanding and agrees with plan, no indication for antibiotics or admission at this time.  Recommend follow up with PCP within 1 week for re-evaluation and return to ED sooner for worsening symptoms.                          Clinical Impression:   Final diagnoses:  [S80.862A, W57.XXXA] Insect bite of left lower leg, initial encounter - BIlateral (Primary)  [L29.9] Itching        ED Disposition Condition    Discharge Stable          ED Prescriptions    None       Follow-up Information       Follow up With Specialties Details Why Contact Info Additional Information    [facility] - ED Emergency Medicine  As needed, If symptoms worsen [address] [place] [address] Dr [place] [address] 1st floor              Mauro Weston Jr.,   08/13/23 1034

## 2023-09-16 ENCOUNTER — HOSPITAL ENCOUNTER (EMERGENCY)
Facility: HOSPITAL | Age: 88
Discharge: HOME | End: 2023-09-16
Attending: EMERGENCY MEDICINE
Payer: MEDICARE

## 2023-09-16 VITALS
BODY MASS INDEX: 18.5 KG/M2 | DIASTOLIC BLOOD PRESSURE: 103 MMHG | WEIGHT: 98 LBS | HEIGHT: 61 IN | RESPIRATION RATE: 20 BRPM | SYSTOLIC BLOOD PRESSURE: 199 MMHG | TEMPERATURE: 98 F | HEART RATE: 71 BPM | OXYGEN SATURATION: 100 %
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
## 2023-09-16 PROCEDURE — 99282 EMERGENCY DEPT VISIT SF MDM: CPT

## 2023-09-16 RX ORDER — TRIAMCINOLONE ACETONIDE 1 MG/G
CREAM TOPICAL 2 TIMES DAILY
Qty: 80 G | Refills: 0 | Status: ON HOLD | OUTPATIENT
Start: 2023-09-16 | End: 2023-10-11 | Stop reason: HOSPADM

## 2023-09-17 NOTE — ED PROVIDER NOTES
Encounter Date: 9/16/2023       History     Chief Complaint   Patient presents with    Sore     Pt has multiple sores on buttocks. Pt voiced they itch. X2 days    Itching     Patient here with reported bug bites states she has some bites on her legs arm her buttocks they are itchy she is seen little small black bugs patient denies any other symptoms        Review of patient's allergies indicates:   Allergen Reactions    Pcn [penicillins] Rash    Tylenol [acetaminophen] Other (See Comments)     headaches     Past Medical History:   Diagnosis Date    Arthritis     Back pain     Hypertension     Wears glasses     Wears partial dentures     upper     Past Surgical History:   Procedure Laterality Date    CHOLECYSTECTOMY      EYE SURGERY Bilateral     cataracts    JOINT REPLACEMENT Right     knee    KNEE SURGERY       No family history on file.  Social History     Tobacco Use    Smoking status: Never    Smokeless tobacco: Never   Substance Use Topics    Alcohol use: No    Drug use: No     Review of Systems   Skin:  Positive for rash.   All other systems reviewed and are negative.      Physical Exam     Initial Vitals [09/16/23 2121]   BP Pulse Resp Temp SpO2   (!) 199/103 71 20 98.2 °F (36.8 °C) 100 %      MAP       --         Physical Exam    Constitutional: She appears well-developed and well-nourished. No distress.   HENT:   Head: Normocephalic and atraumatic.   Right Ear: External ear normal.   Left Ear: External ear normal.   Mouth/Throat: Oropharynx is clear and moist.   Cardiovascular:  Normal rate, regular rhythm and intact distal pulses.           Pulmonary/Chest: No respiratory distress.     Neurological: She is alert and oriented to person, place, and time. GCS score is 15. GCS eye subscore is 4. GCS verbal subscore is 5. GCS motor subscore is 6.   Skin: Skin is warm and dry. Capillary refill takes less than 2 seconds.   Patient has scattered papular excoriated areas consistent with bug bites         ED Course    Procedures  Labs Reviewed - No data to display       Imaging Results    None          Medications - No data to display  Medical Decision Making  Will place patient on triamcinolone cream Benadryl as needed for itching    Risk  Prescription drug management.                               Clinical Impression:   Final diagnoses:  [W57.XXXA] Insect bite, unspecified site, initial encounter (Primary)        ED Disposition Condition    Discharge Stable          ED Prescriptions       Medication Sig Dispense Start Date End Date Auth. Provider    triamcinolone acetonide 0.1% (KENALOG) 0.1 % cream Apply topically 2 (two) times daily. 80 g 9/16/2023 -- Adair Gutierrez MD          Follow-up Information       Follow up With Specialties Details Why Contact Info    Kali Mccullough MD Internal Medicine Call in 1 day for re-examination of your symptoms [address] [place] [address] Dr Dempsey  [place] [address]  [phone]               Adair Gutierrez MD  09/16/23 3450

## 2023-10-10 ENCOUNTER — HOSPITAL ENCOUNTER (OUTPATIENT)
Facility: HOSPITAL | Age: 88
Discharge: HOME HEALTH | End: 2023-10-11
Attending: EMERGENCY MEDICINE | Admitting: FAMILY MEDICINE
Payer: MEDICARE

## 2023-10-10 DIAGNOSIS — I63.9 CEREBROVASCULAR ACCIDENT (CVA), UNSPECIFIED MECHANISM: Primary | ICD-10-CM

## 2023-10-10 DIAGNOSIS — I63.9 STROKE: ICD-10-CM

## 2023-10-10 DIAGNOSIS — I63.9 CVA (CEREBRAL VASCULAR ACCIDENT): ICD-10-CM

## 2023-10-10 DIAGNOSIS — F03.918 DEMENTIA WITH BEHAVIORAL DISTURBANCE: ICD-10-CM

## 2023-10-10 DIAGNOSIS — R07.9 CHEST PAIN: ICD-10-CM

## 2023-10-10 LAB
ALBUMIN SERPL BCP-MCNC: 4.1 G/DL (ref 3.5–5.2)
ALP SERPL-CCNC: 56 U/L (ref 55–135)
ALT SERPL W/O P-5'-P-CCNC: 21 U/L (ref 10–44)
ANION GAP SERPL CALC-SCNC: 7 MMOL/L (ref 8–16)
AST SERPL-CCNC: 24 U/L (ref 10–40)
BASOPHILS # BLD AUTO: 0.06 K/UL (ref 0–0.2)
BASOPHILS NFR BLD: 0.8 % (ref 0–1.9)
BILIRUB SERPL-MCNC: 1 MG/DL (ref 0.1–1)
BILIRUB UR QL STRIP: NEGATIVE
BNP SERPL-MCNC: 27 PG/ML (ref 0–99)
BUN SERPL-MCNC: 24 MG/DL (ref 8–23)
CALCIUM SERPL-MCNC: 9.6 MG/DL (ref 8.7–10.5)
CHLORIDE SERPL-SCNC: 102 MMOL/L (ref 95–110)
CHOLEST SERPL-MCNC: 162 MG/DL (ref 120–199)
CHOLEST/HDLC SERPL: 2.3 {RATIO} (ref 2–5)
CLARITY UR: CLEAR
CO2 SERPL-SCNC: 28 MMOL/L (ref 23–29)
COLOR UR: YELLOW
CREAT SERPL-MCNC: 1 MG/DL (ref 0.5–1.4)
CREAT SERPL-MCNC: 1 MG/DL (ref 0.5–1.4)
DIFFERENTIAL METHOD BLD: ABNORMAL
EOSINOPHIL # BLD AUTO: 0.8 K/UL (ref 0–0.5)
EOSINOPHIL NFR BLD: 11.3 % (ref 0–8)
ERYTHROCYTE [DISTWIDTH] IN BLOOD BY AUTOMATED COUNT: 12.5 % (ref 11.5–14.5)
EST. GFR  (NO RACE VARIABLE): 53.9 ML/MIN/1.73 M^2
GLUCOSE SERPL-MCNC: 138 MG/DL (ref 70–110)
GLUCOSE UR QL STRIP: NEGATIVE
HCT VFR BLD AUTO: 38.2 % (ref 37–48.5)
HDLC SERPL-MCNC: 72 MG/DL (ref 40–75)
HDLC SERPL: 44.4 % (ref 20–50)
HGB BLD-MCNC: 13 G/DL (ref 12–16)
HGB UR QL STRIP: NEGATIVE
IMM GRANULOCYTES # BLD AUTO: 0.01 K/UL (ref 0–0.04)
IMM GRANULOCYTES NFR BLD AUTO: 0.1 % (ref 0–0.5)
INR PPP: 1 (ref 0.8–1.2)
KETONES UR QL STRIP: NEGATIVE
LDLC SERPL CALC-MCNC: 67.8 MG/DL (ref 63–159)
LEUKOCYTE ESTERASE UR QL STRIP: NEGATIVE
LYMPHOCYTES # BLD AUTO: 2 K/UL (ref 1–4.8)
LYMPHOCYTES NFR BLD: 26.7 % (ref 18–48)
MCH RBC QN AUTO: 32.4 PG (ref 27–31)
MCHC RBC AUTO-ENTMCNC: 34 G/DL (ref 32–36)
MCV RBC AUTO: 95 FL (ref 82–98)
MONOCYTES # BLD AUTO: 0.6 K/UL (ref 0.3–1)
MONOCYTES NFR BLD: 7.8 % (ref 4–15)
NEUTROPHILS # BLD AUTO: 4 K/UL (ref 1.8–7.7)
NEUTROPHILS NFR BLD: 53.3 % (ref 38–73)
NITRITE UR QL STRIP: NEGATIVE
NONHDLC SERPL-MCNC: 90 MG/DL
NRBC BLD-RTO: 0 /100 WBC
PH UR STRIP: 7 [PH] (ref 5–8)
PLATELET # BLD AUTO: 279 K/UL (ref 150–450)
PMV BLD AUTO: 9.9 FL (ref 9.2–12.9)
POTASSIUM SERPL-SCNC: 3.4 MMOL/L (ref 3.5–5.1)
PROT SERPL-MCNC: 7 G/DL (ref 6–8.4)
PROT UR QL STRIP: NEGATIVE
PROTHROMBIN TIME: 11.6 SEC (ref 9–12.5)
RBC # BLD AUTO: 4.01 M/UL (ref 4–5.4)
SAMPLE: NORMAL
SODIUM SERPL-SCNC: 137 MMOL/L (ref 136–145)
SP GR UR STRIP: >1.03 (ref 1–1.03)
TRIGL SERPL-MCNC: 111 MG/DL (ref 30–150)
TROPONIN I SERPL HS-MCNC: 4.8 PG/ML (ref 0–14.9)
TROPONIN I SERPL HS-MCNC: 8.9 PG/ML (ref 0–14.9)
TSH SERPL DL<=0.005 MIU/L-ACNC: 1.82 UIU/ML (ref 0.34–5.6)
URN SPEC COLLECT METH UR: ABNORMAL
UROBILINOGEN UR STRIP-ACNC: NEGATIVE EU/DL
WBC # BLD AUTO: 7.42 K/UL (ref 3.9–12.7)

## 2023-10-10 PROCEDURE — 83880 ASSAY OF NATRIURETIC PEPTIDE: CPT | Performed by: EMERGENCY MEDICINE

## 2023-10-10 PROCEDURE — 25000003 PHARM REV CODE 250: Performed by: EMERGENCY MEDICINE

## 2023-10-10 PROCEDURE — 80061 LIPID PANEL: CPT | Performed by: EMERGENCY MEDICINE

## 2023-10-10 PROCEDURE — G0378 HOSPITAL OBSERVATION PER HR: HCPCS

## 2023-10-10 PROCEDURE — 96372 THER/PROPH/DIAG INJ SC/IM: CPT | Mod: 59 | Performed by: FAMILY MEDICINE

## 2023-10-10 PROCEDURE — 84484 ASSAY OF TROPONIN QUANT: CPT | Performed by: FAMILY MEDICINE

## 2023-10-10 PROCEDURE — 99285 EMERGENCY DEPT VISIT HI MDM: CPT | Mod: 25

## 2023-10-10 PROCEDURE — 63600175 PHARM REV CODE 636 W HCPCS: Performed by: FAMILY MEDICINE

## 2023-10-10 PROCEDURE — 84484 ASSAY OF TROPONIN QUANT: CPT | Mod: 91 | Performed by: EMERGENCY MEDICINE

## 2023-10-10 PROCEDURE — 85610 PROTHROMBIN TIME: CPT | Performed by: EMERGENCY MEDICINE

## 2023-10-10 PROCEDURE — 82565 ASSAY OF CREATININE: CPT | Mod: 59

## 2023-10-10 PROCEDURE — 93005 ELECTROCARDIOGRAM TRACING: CPT | Performed by: GENERAL PRACTICE

## 2023-10-10 PROCEDURE — 25000003 PHARM REV CODE 250: Performed by: FAMILY MEDICINE

## 2023-10-10 PROCEDURE — 93010 ELECTROCARDIOGRAM REPORT: CPT | Mod: ,,, | Performed by: GENERAL PRACTICE

## 2023-10-10 PROCEDURE — 83036 HEMOGLOBIN GLYCOSYLATED A1C: CPT | Performed by: FAMILY MEDICINE

## 2023-10-10 PROCEDURE — 80053 COMPREHEN METABOLIC PANEL: CPT | Performed by: EMERGENCY MEDICINE

## 2023-10-10 PROCEDURE — 25500020 PHARM REV CODE 255: Performed by: EMERGENCY MEDICINE

## 2023-10-10 PROCEDURE — 85025 COMPLETE CBC W/AUTO DIFF WBC: CPT | Performed by: EMERGENCY MEDICINE

## 2023-10-10 PROCEDURE — 81003 URINALYSIS AUTO W/O SCOPE: CPT | Performed by: FAMILY MEDICINE

## 2023-10-10 PROCEDURE — 84443 ASSAY THYROID STIM HORMONE: CPT | Performed by: EMERGENCY MEDICINE

## 2023-10-10 PROCEDURE — G0426 INPT/ED TELECONSULT50: HCPCS | Mod: 95,,, | Performed by: PSYCHIATRY & NEUROLOGY

## 2023-10-10 RX ORDER — IPRATROPIUM BROMIDE AND ALBUTEROL SULFATE 2.5; .5 MG/3ML; MG/3ML
3 SOLUTION RESPIRATORY (INHALATION) EVERY 4 HOURS PRN
Status: DISCONTINUED | OUTPATIENT
Start: 2023-10-10 | End: 2023-10-11 | Stop reason: HOSPADM

## 2023-10-10 RX ORDER — NAPROXEN SODIUM 220 MG/1
81 TABLET, FILM COATED ORAL
Status: COMPLETED | OUTPATIENT
Start: 2023-10-10 | End: 2023-10-10

## 2023-10-10 RX ORDER — ENOXAPARIN SODIUM 100 MG/ML
30 INJECTION SUBCUTANEOUS EVERY 24 HOURS
Status: DISCONTINUED | OUTPATIENT
Start: 2023-10-10 | End: 2023-10-11 | Stop reason: HOSPADM

## 2023-10-10 RX ORDER — HYDRALAZINE HYDROCHLORIDE 20 MG/ML
10 INJECTION INTRAMUSCULAR; INTRAVENOUS EVERY 6 HOURS PRN
Status: DISCONTINUED | OUTPATIENT
Start: 2023-10-10 | End: 2023-10-10

## 2023-10-10 RX ORDER — HYDRALAZINE HYDROCHLORIDE 20 MG/ML
5 INJECTION INTRAMUSCULAR; INTRAVENOUS EVERY 4 HOURS PRN
Status: DISCONTINUED | OUTPATIENT
Start: 2023-10-10 | End: 2023-10-11 | Stop reason: HOSPADM

## 2023-10-10 RX ORDER — ATORVASTATIN CALCIUM 40 MG/1
40 TABLET, FILM COATED ORAL NIGHTLY
Status: DISCONTINUED | OUTPATIENT
Start: 2023-10-10 | End: 2023-10-11 | Stop reason: HOSPADM

## 2023-10-10 RX ORDER — ASPIRIN 81 MG/1
243 TABLET ORAL ONCE
Status: DISCONTINUED | OUTPATIENT
Start: 2023-10-10 | End: 2023-10-11 | Stop reason: HOSPADM

## 2023-10-10 RX ORDER — IBUPROFEN 200 MG
16 TABLET ORAL
Status: DISCONTINUED | OUTPATIENT
Start: 2023-10-10 | End: 2023-10-11 | Stop reason: HOSPADM

## 2023-10-10 RX ORDER — GLUCAGON 1 MG
1 KIT INJECTION
Status: DISCONTINUED | OUTPATIENT
Start: 2023-10-10 | End: 2023-10-11 | Stop reason: HOSPADM

## 2023-10-10 RX ORDER — SODIUM CHLORIDE 0.9 % (FLUSH) 0.9 %
10 SYRINGE (ML) INJECTION
Status: DISCONTINUED | OUTPATIENT
Start: 2023-10-10 | End: 2023-10-11 | Stop reason: HOSPADM

## 2023-10-10 RX ORDER — POTASSIUM CHLORIDE 20 MEQ/1
40 TABLET, EXTENDED RELEASE ORAL
Status: COMPLETED | OUTPATIENT
Start: 2023-10-10 | End: 2023-10-10

## 2023-10-10 RX ORDER — NALOXONE HCL 0.4 MG/ML
0.02 VIAL (ML) INJECTION
Status: DISCONTINUED | OUTPATIENT
Start: 2023-10-10 | End: 2023-10-11 | Stop reason: HOSPADM

## 2023-10-10 RX ORDER — ONDANSETRON HYDROCHLORIDE 2 MG/ML
4 INJECTION, SOLUTION INTRAVENOUS EVERY 6 HOURS PRN
Status: DISCONTINUED | OUTPATIENT
Start: 2023-10-10 | End: 2023-10-11 | Stop reason: HOSPADM

## 2023-10-10 RX ORDER — AMOXICILLIN 250 MG
1 CAPSULE ORAL 2 TIMES DAILY PRN
Status: DISCONTINUED | OUTPATIENT
Start: 2023-10-10 | End: 2023-10-11 | Stop reason: HOSPADM

## 2023-10-10 RX ORDER — HYDRALAZINE HYDROCHLORIDE 20 MG/ML
10 INJECTION INTRAMUSCULAR; INTRAVENOUS EVERY 4 HOURS PRN
Status: DISCONTINUED | OUTPATIENT
Start: 2023-10-10 | End: 2023-10-11 | Stop reason: HOSPADM

## 2023-10-10 RX ORDER — SIMETHICONE 80 MG
1 TABLET,CHEWABLE ORAL 4 TIMES DAILY PRN
Status: DISCONTINUED | OUTPATIENT
Start: 2023-10-10 | End: 2023-10-11 | Stop reason: HOSPADM

## 2023-10-10 RX ORDER — ENOXAPARIN SODIUM 100 MG/ML
40 INJECTION SUBCUTANEOUS EVERY 24 HOURS
Status: DISCONTINUED | OUTPATIENT
Start: 2023-10-10 | End: 2023-10-10

## 2023-10-10 RX ORDER — IBUPROFEN 200 MG
24 TABLET ORAL
Status: DISCONTINUED | OUTPATIENT
Start: 2023-10-10 | End: 2023-10-11 | Stop reason: HOSPADM

## 2023-10-10 RX ORDER — POLYETHYLENE GLYCOL 3350 17 G/17G
17 POWDER, FOR SOLUTION ORAL 2 TIMES DAILY PRN
Status: DISCONTINUED | OUTPATIENT
Start: 2023-10-10 | End: 2023-10-11 | Stop reason: HOSPADM

## 2023-10-10 RX ORDER — TALC
6 POWDER (GRAM) TOPICAL NIGHTLY PRN
Status: DISCONTINUED | OUTPATIENT
Start: 2023-10-10 | End: 2023-10-11 | Stop reason: HOSPADM

## 2023-10-10 RX ORDER — ASPIRIN 81 MG/1
81 TABLET ORAL DAILY
Status: DISCONTINUED | OUTPATIENT
Start: 2023-10-11 | End: 2023-10-11 | Stop reason: HOSPADM

## 2023-10-10 RX ADMIN — IOHEXOL 100 ML: 350 INJECTION, SOLUTION INTRAVENOUS at 04:10

## 2023-10-10 RX ADMIN — ASPIRIN 81 MG 81 MG: 81 TABLET ORAL at 07:10

## 2023-10-10 RX ADMIN — POTASSIUM CHLORIDE EXTENDED-RELEASE 40 MEQ: 1500 TABLET ORAL at 07:10

## 2023-10-10 RX ADMIN — ENOXAPARIN SODIUM 30 MG: 30 INJECTION SUBCUTANEOUS at 10:10

## 2023-10-10 RX ADMIN — ATORVASTATIN CALCIUM 40 MG: 40 TABLET, FILM COATED ORAL at 10:10

## 2023-10-10 NOTE — SUBJECTIVE & OBJECTIVE
"  Woke up with symptoms?: no    Recent bleeding noted: no  Does the patient take any Blood Thinners? no  Medications: No relevant medications      Past Medical History: hypertension    Past Surgical History: no major surgeries within the last 2 weeks    Family History: no relevant history    Social History: unable to obtain    Allergies: Pcn [Penicillins]  Tylenol [Acetaminophen] No relevant allergies    Review of Systems  Objective:   Vitals: Blood pressure (!) 151/69, pulse 84, temperature 98.3 °F (36.8 °C), temperature source Oral, resp. rate 16, height 5' 1" (1.549 m), weight 44.5 kg (98 lb), SpO2 99 %. BP: 151/69    CT READ: Yes  No hemmorhage. No mass effect. No early infarct signs.     Physical Exam        "

## 2023-10-10 NOTE — CONSULTS
Received a request for Acute TeleStroke consultation at 15:42. Returned call via cart ay 15:50.   Patient reportedly an unreliable historian and has not undergone any imaging as of yet thus cart brought to the ED physician Dr. Gutierrez.   As per Dr. Gutierrez, patient is a 89 yo female w/ PMHx of HTN, reported progressive memory changes x several months who presents to the ED from the [facility] after she was noted to be confused.   Difficult to establish the timeline, but per  EMS report, patient was at the bank 2 1/2 hours or so prior to arrival at the ED (13:30)  and was in her normal state of health, and then returned to the [facility] w/ noted confusion at which time EMS was called.   Per ED physician's report - patient's family reports progressive memory deficits, but no previus episodes such as this.   No family available at bedside at the moment.      Requested a call back once patient is back in the room as she has still not gone for imaging studies.   Dr. Gutierrez states the referring facility will call back once patient is in the room.     Cony Funez MD  Vascular Neurology

## 2023-10-10 NOTE — ED PROVIDER NOTES
Encounter Date: 10/10/2023       History     Chief Complaint   Patient presents with    Altered Mental Status     Patient presents emergency department reported aphasia confusion last seen well 2 hours prior to event patient had gone to [employer] and was interacting normally with [facility] staff then re-presented to the [employer] and had difficulty speaking confusion at EMS arrival patient's speech was noted noted to be garbled confused she was able to name some objects state that the symptoms have waxed and waned throughout transport to the emergency department arrival emergency department patient is able to say something clearly but is confused other things has difficulty naming some objects is able to name others is no evidence of lateralizing weakness at this time        Review of patient's allergies indicates:   Allergen Reactions    Pcn [penicillins] Rash    Tylenol [acetaminophen] Other (See Comments)     headaches     Past Medical History:   Diagnosis Date    Arthritis     Back pain     Hypertension     Wears glasses     Wears partial dentures     upper     Past Surgical History:   Procedure Laterality Date    CHOLECYSTECTOMY      EYE SURGERY Bilateral     cataracts    JOINT REPLACEMENT Right     knee    KNEE SURGERY       No family history on file.  Social History     Tobacco Use    Smoking status: Never    Smokeless tobacco: Never   Substance Use Topics    Alcohol use: No    Drug use: No     Review of Systems   Unable to perform ROS: Mental status change       Physical Exam     Initial Vitals [10/10/23 1535]   BP Pulse Resp Temp SpO2   (!) 151/69 82 16 98.3 °F (36.8 °C) 100 %      MAP       --         Physical Exam    Constitutional: She appears well-developed and well-nourished. No distress.   HENT:   Head: Normocephalic and atraumatic.   Right Ear: External ear normal.   Left Ear: External ear normal.   Mouth/Throat: Oropharynx is clear and moist.   Eyes: Conjunctivae and EOM are normal. Pupils are equal,  round, and reactive to light.   Neck: Neck supple.   Normal range of motion.  Cardiovascular:  Normal rate, regular rhythm, normal heart sounds and intact distal pulses.           Pulmonary/Chest: Breath sounds normal. No respiratory distress.   Abdominal: Abdomen is soft. Bowel sounds are normal. There is no abdominal tenderness.   Musculoskeletal:         General: No edema. Normal range of motion.      Cervical back: Normal range of motion and neck supple.     Neurological: She is alert and oriented to person, place, and time. She has normal strength. A cranial nerve deficit is present. GCS score is 15. GCS eye subscore is 4. GCS verbal subscore is 5. GCS motor subscore is 6.   Skin: Skin is warm and dry. Capillary refill takes less than 2 seconds. No rash noted.   Psychiatric: She has a normal mood and affect. Her behavior is normal.         ED Course   Procedures  Labs Reviewed   CBC W/ AUTO DIFFERENTIAL - Abnormal; Notable for the following components:       Result Value    MCH 32.4 (*)     Eos # 0.8 (*)     Eosinophil % 11.3 (*)     All other components within normal limits   COMPREHENSIVE METABOLIC PANEL - Abnormal; Notable for the following components:    Potassium 3.4 (*)     Glucose 138 (*)     BUN 24 (*)     eGFR 53.9 (*)     Anion Gap 7 (*)     All other components within normal limits   PROTIME-INR   TSH   LIPID PANEL   B-TYPE NATRIURETIC PEPTIDE   TROPONIN I HIGH SENSITIVITY   URINALYSIS, REFLEX TO URINE CULTURE   HEMOGLOBIN A1C   TROPONIN I HIGH SENSITIVITY   ISTAT CREATININE   POCT GLUCOSE MONITORING CONTINUOUS        ECG Results              ECG 12 lead (In process)  Result time 10/10/23 16:25:28      In process by Interface, Lab In [facility] (10/10/23 16:25:28)                   Narrative:    Test Reason : I63.9,    Vent. Rate : 085 BPM     Atrial Rate : 085 BPM     P-R Int : 164 ms          QRS Dur : 088 ms      QT Int : 400 ms       P-R-T Axes : 073 -05 -10 degrees     QTc Int : 476 ms    Normal  sinus rhythm  Cannot rule out Anterior infarct ,age undetermined  Abnormal ECG  When compared with ECG of 09-NOV-2017 08:40,  QRS voltage has decreased  Minimal criteria for Anterior infarct are now Present  Nonspecific T wave abnormality now evident in Inferior leads    Referred By: [email or web address]   SELF           Confirmed By:                                   Imaging Results              X-Ray Chest 1 View (In process)                      CTA Head and Neck (xpd) (Final result)  Result time 10/10/23 16:43:52      Final result by Cynthia Piña MD (10/10/23 16:43:52)                   Narrative:    EXAMINATION:  CTA HEAD AND NECK (XPD)    CLINICAL INDICATION: Female, 89 years old. Neuro deficit, acute, stroke suspected    TECHNIQUE: Axial CT angiogram of the head and neck was performed with contrast. Multiplanar reformations as well as 3-D volume rendered imaging were reviewed at the workstation. Degree of stenosis was measured utilizing the NASCET method.    CONTRAST: 100 mL mL of Omnipaque 350 IV.    COMPARISON: None    FINDINGS:  CTA brain: The distal vertebral arteries and basilar artery and cavernous portion of the internal carotid arteries are patent.  The anterior cerebral arteries, middle cerebral arteries and posterior cerebral arteries are widely patent without large vessel occlusion, significant stenosis, aneurysm or AVM  Dural venous sinuses are patent..  Patient has a known well marginated hyperdense ovoid mass along the left tentorium extending into the left occipital and posterior temporal lobe. Measuring 3.0 x 2.2 x 2.0 cm compatible with meningioma      Aortic Arch and Great Vessel Origins: Three-vessel aortic arch  Subclavian Arteries: There is intimal wall thickening with scattered calcified plaque at the origin of the left subclavian artery resulting in less than 50% stenosis. The right subclavian artery is widely patent.  Right Common Carotid Artery: Widely patent.  Right Internal Carotid  Artery: Widely patent.  Right External Carotid Artery: Widely patent.  Left Common Carotid Artery: Widely patent.  Left Internal Carotid Artery: Calcified plaque at the carotid bulb and proximal left internal carotid artery resulting in approximately 50-60% stenosis. The mid and distal left internal carotid artery are widely patent  Left External Carotid Artery: Widely patent.  Right Vertebral Artery: Widely patent.  Left Vertebral Artery: Widely patent.    There are degenerative changes of the spine. The paraspinous soft tissues are normal.  The lung apices are clear.      IMPRESSION:  Normal CTA of the brain    Calcified plaque in the proximal left internal carotid artery resulting in a 56% hemodynamically significant stenosis    No hemodynamically significant stenosis in the right internal carotid artery    3.0 x 2.2 x 2.0 cm well marginated hyperdense ovoid mass along the left tentorium extending into left occipital posterior temporal lobe compatible with meningioma    This exam was performed according to our departmental dose-optimization program which includes automated exposure control, adjustment of the mA and/or kV according to patient size and/or use of iterative reconstruction technique.    Electronically signed by:  Cynthia Piña MD  10/10/2023 04:43 PM CDT Workstation: [id number]                                     CT HEAD FOR STROKE (Final result)  Result time 10/10/23 16:21:32      Final result by Cynthia Piña MD (10/10/23 16:21:32)                   Narrative:    CMS MANDATED QUALITY DATA - CT RADIATION  436    All CT scans at this facility utilize dose modulation, iterative reconstruction, and/or weight based dosing when appropriate to reduce radiation dose to as low as reasonably achievable.  CT head without contrast    Clinical data: Neuro deficit acute, history of meningioma    COMPARISON: 7/29/2023    FINDINGS:    The ventricles and sulci are prominent compatible with generalized  cerebral atrophy. There is moderate periventricular low attenuation compatible with chronic small vessel disease.  There is a stable 2.5 x 1.9 cm well marginated ovoid hyperdense mass along the left tentorium within the left temporal occipital region.    There is no hemorrhage or midline shift. There is no surrounding vasogenic edema.  The orbits are normal.  Paranasal sinuses and mastoid air cells are clear.    IMPRESSION: Age related volume loss with moderate chronic small vessel disease    No acute intervening process    Stable 2.5 x 1.9 cm hyperdense meningioma along the left tentorium extending into the left temporal occipital lobes    These findings were called to the ordering physician at 4:20 PM    Electronically signed by:  Cynthia Piña MD  10/10/2023 04:21 PM CDT Workstation: [id number]                                     Medications   sodium chloride 0.9% flush 10 mL (has no administration in time range)   atorvastatin tablet 40 mg (has no administration in time range)   aspirin EC tablet 81 mg (has no administration in time range)   hydrALAZINE injection 10 mg (has no administration in time range)   hydrALAZINE injection 5 mg (has no administration in time range)   sodium chloride 0.9% flush 10 mL (has no administration in time range)   albuterol-ipratropium 2.5 mg-0.5 mg/3 mL nebulizer solution 3 mL (has no administration in time range)   melatonin tablet 6 mg (has no administration in time range)   ondansetron injection 4 mg (has no administration in time range)   polyethylene glycol packet 17 g (has no administration in time range)   senna-docusate 8.6-50 mg per tablet 1 tablet (has no administration in time range)   simethicone chewable tablet 80 mg (has no administration in time range)   naloxone 0.4 mg/mL injection 0.02 mg (has no administration in time range)   glucose chewable tablet 16 g (has no administration in time range)   glucose chewable tablet 24 g (has no administration in time range)    dextrose 50% injection 12.5 g (has no administration in time range)   dextrose 50% injection 25 g (has no administration in time range)   glucagon (human recombinant) injection 1 mg (has no administration in time range)   tramadol split tablet 25 mg (has no administration in time range)   enoxaparin injection 30 mg (has no administration in time range)   aspirin EC tablet 243 mg (has no administration in time range)   iohexoL (OMNIPAQUE 350) injection 100 mL (100 mLs Intravenous Given 10/10/23 1619)   potassium chloride SA CR tablet 40 mEq (40 mEq Oral Given 10/10/23 1950)   aspirin chewable tablet 81 mg (81 mg Oral Given 10/10/23 1950)     Medical Decision Making  Patient's symptoms have improved from worst remains with some mild expressive aphasia difficulty naming objects and mild confusion there is no lateralizing weakness given the relative mildness of patient's symptoms and signs of improvement she is not a candidate for tPA I have consulted tele neurology patient's CTA shows no large vessel occlusion laboratory evaluation reviewed patient has received aspirin in the emergency department will admit for further evaluation and treatment    Amount and/or Complexity of Data Reviewed  Labs: ordered. Decision-making details documented in ED Course.  Radiology: ordered. Decision-making details documented in ED Course.  ECG/medicine tests: ordered. Decision-making details documented in ED Course.    Risk  OTC drugs.  Prescription drug management.                               Clinical Impression:   Final diagnoses:  [I63.9] Stroke  [I63.9] CVA (cerebral vascular accident)        ED Disposition Condition    Observation                 Adair Gutierrez MD  10/10/23 2119

## 2023-10-10 NOTE — HPI
"Per chart "89 yo female w/ PMHx of HTN, reported progressive memory changes x several months who presents to the ED from the [facility] after she was noted to be confused.   Difficult to establish the timeline, but per  EMS report, patient was at the bank 2 1/2 hours or so prior to arrival at the ED (13:30)  and was in her normal state of health, and then returned to the [facility] w/ noted confusion at which time EMS was called.   Per ED physician's report - patient's family reports progressive memory deficits, but no previus episodes such as this.   No family available at bedside at the moment. "        I was called around 6:14 a.m. when patient was already out of thrombolysis window    "

## 2023-10-10 NOTE — ED NOTES
ALERT BUT CONFUSED.  SPEECH GARBLED. JOANN GANT HAVING DIFFICULTY FOLLOWING COMMANDS. CT AND CTA COMPLETED.  NEURO ON CALL SEES PT BUT DEFERS TO POST CT AND HX SINCE PT ISNT A RELIABLE HISTORIAN.

## 2023-10-11 VITALS
HEIGHT: 61 IN | RESPIRATION RATE: 20 BRPM | OXYGEN SATURATION: 98 % | TEMPERATURE: 98 F | BODY MASS INDEX: 16.98 KG/M2 | SYSTOLIC BLOOD PRESSURE: 174 MMHG | HEART RATE: 68 BPM | DIASTOLIC BLOOD PRESSURE: 75 MMHG | WEIGHT: 89.94 LBS

## 2023-10-11 PROBLEM — I65.22 STENOSIS OF LEFT CAROTID ARTERY: Chronic | Status: ACTIVE | Noted: 2023-10-11

## 2023-10-11 PROBLEM — N17.9 AKI (ACUTE KIDNEY INJURY): Status: RESOLVED | Noted: 2023-10-11 | Resolved: 2023-10-11

## 2023-10-11 PROBLEM — E87.6 HYPOKALEMIA: Status: RESOLVED | Noted: 2023-10-11 | Resolved: 2023-10-11

## 2023-10-11 PROBLEM — D32.9 MENINGIOMA: Chronic | Status: ACTIVE | Noted: 2023-10-11

## 2023-10-11 PROBLEM — E87.6 HYPOKALEMIA: Status: ACTIVE | Noted: 2023-10-11

## 2023-10-11 PROBLEM — F03.918 DEMENTIA WITH BEHAVIORAL DISTURBANCE: Status: ACTIVE | Noted: 2023-10-11

## 2023-10-11 PROBLEM — N17.9 AKI (ACUTE KIDNEY INJURY): Status: ACTIVE | Noted: 2023-10-11

## 2023-10-11 LAB
ALBUMIN SERPL BCP-MCNC: 4.1 G/DL (ref 3.5–5.2)
ALP SERPL-CCNC: 56 U/L (ref 55–135)
ALT SERPL W/O P-5'-P-CCNC: 21 U/L (ref 10–44)
ANION GAP SERPL CALC-SCNC: 7 MMOL/L (ref 8–16)
ANION GAP SERPL CALC-SCNC: 7 MMOL/L (ref 8–16)
APTT PPP: 27.1 SEC (ref 21–32)
AST SERPL-CCNC: 24 U/L (ref 10–40)
BASOPHILS # BLD AUTO: 0.07 K/UL (ref 0–0.2)
BASOPHILS NFR BLD: 0.9 % (ref 0–1.9)
BILIRUB SERPL-MCNC: 0.9 MG/DL (ref 0.1–1)
BUN SERPL-MCNC: 14 MG/DL (ref 8–23)
BUN SERPL-MCNC: 14 MG/DL (ref 8–23)
CALCIUM SERPL-MCNC: 9.6 MG/DL (ref 8.7–10.5)
CALCIUM SERPL-MCNC: 9.6 MG/DL (ref 8.7–10.5)
CHLORIDE SERPL-SCNC: 106 MMOL/L (ref 95–110)
CHLORIDE SERPL-SCNC: 106 MMOL/L (ref 95–110)
CK MB SERPL-MCNC: 5.5 NG/ML (ref 0.1–6.5)
CO2 SERPL-SCNC: 26 MMOL/L (ref 23–29)
CO2 SERPL-SCNC: 26 MMOL/L (ref 23–29)
CREAT SERPL-MCNC: 0.6 MG/DL (ref 0.5–1.4)
CREAT SERPL-MCNC: 0.6 MG/DL (ref 0.5–1.4)
DIFFERENTIAL METHOD BLD: ABNORMAL
EOSINOPHIL # BLD AUTO: 1.2 K/UL (ref 0–0.5)
EOSINOPHIL NFR BLD: 15.6 % (ref 0–8)
ERYTHROCYTE [DISTWIDTH] IN BLOOD BY AUTOMATED COUNT: 12.4 % (ref 11.5–14.5)
EST. GFR  (NO RACE VARIABLE): >60 ML/MIN/1.73 M^2
EST. GFR  (NO RACE VARIABLE): >60 ML/MIN/1.73 M^2
ESTIMATED AVG GLUCOSE: 117 MG/DL (ref 68–131)
GLUCOSE SERPL-MCNC: 91 MG/DL (ref 70–110)
GLUCOSE SERPL-MCNC: 91 MG/DL (ref 70–110)
HBA1C MFR BLD: 5.7 % (ref 4.5–6.2)
HCT VFR BLD AUTO: 40.2 % (ref 37–48.5)
HGB BLD-MCNC: 13.6 G/DL (ref 12–16)
IMM GRANULOCYTES # BLD AUTO: 0.01 K/UL (ref 0–0.04)
IMM GRANULOCYTES NFR BLD AUTO: 0.1 % (ref 0–0.5)
INR PPP: 1.1 (ref 0.8–1.2)
LYMPHOCYTES # BLD AUTO: 2.2 K/UL (ref 1–4.8)
LYMPHOCYTES NFR BLD: 28.6 % (ref 18–48)
MAGNESIUM SERPL-MCNC: 1.8 MG/DL (ref 1.6–2.6)
MCH RBC QN AUTO: 32.3 PG (ref 27–31)
MCHC RBC AUTO-ENTMCNC: 33.8 G/DL (ref 32–36)
MCV RBC AUTO: 96 FL (ref 82–98)
MONOCYTES # BLD AUTO: 0.7 K/UL (ref 0.3–1)
MONOCYTES NFR BLD: 8.4 % (ref 4–15)
NEUTROPHILS # BLD AUTO: 3.6 K/UL (ref 1.8–7.7)
NEUTROPHILS NFR BLD: 46.4 % (ref 38–73)
NRBC BLD-RTO: 0 /100 WBC
PHOSPHATE SERPL-MCNC: 2.3 MG/DL (ref 2.7–4.5)
PLATELET # BLD AUTO: 263 K/UL (ref 150–450)
PMV BLD AUTO: 9.4 FL (ref 9.2–12.9)
POTASSIUM SERPL-SCNC: 3.8 MMOL/L (ref 3.5–5.1)
POTASSIUM SERPL-SCNC: 3.8 MMOL/L (ref 3.5–5.1)
PROT SERPL-MCNC: 7 G/DL (ref 6–8.4)
PROTHROMBIN TIME: 12.1 SEC (ref 9–12.5)
RBC # BLD AUTO: 4.21 M/UL (ref 4–5.4)
SODIUM SERPL-SCNC: 139 MMOL/L (ref 136–145)
SODIUM SERPL-SCNC: 139 MMOL/L (ref 136–145)
TROPONIN I SERPL HS-MCNC: 8.3 PG/ML (ref 0–14.9)
WBC # BLD AUTO: 7.7 K/UL (ref 3.9–12.7)

## 2023-10-11 PROCEDURE — 85730 THROMBOPLASTIN TIME PARTIAL: CPT | Performed by: FAMILY MEDICINE

## 2023-10-11 PROCEDURE — 82553 CREATINE MB FRACTION: CPT | Performed by: FAMILY MEDICINE

## 2023-10-11 PROCEDURE — 83735 ASSAY OF MAGNESIUM: CPT | Performed by: FAMILY MEDICINE

## 2023-10-11 PROCEDURE — 85025 COMPLETE CBC W/AUTO DIFF WBC: CPT | Performed by: FAMILY MEDICINE

## 2023-10-11 PROCEDURE — 80053 COMPREHEN METABOLIC PANEL: CPT | Performed by: FAMILY MEDICINE

## 2023-10-11 PROCEDURE — G0378 HOSPITAL OBSERVATION PER HR: HCPCS

## 2023-10-11 PROCEDURE — 84484 ASSAY OF TROPONIN QUANT: CPT | Performed by: FAMILY MEDICINE

## 2023-10-11 PROCEDURE — 97535 SELF CARE MNGMENT TRAINING: CPT

## 2023-10-11 PROCEDURE — 92523 SPEECH SOUND LANG COMPREHEN: CPT

## 2023-10-11 PROCEDURE — 84100 ASSAY OF PHOSPHORUS: CPT | Performed by: FAMILY MEDICINE

## 2023-10-11 PROCEDURE — 97116 GAIT TRAINING THERAPY: CPT

## 2023-10-11 PROCEDURE — 97161 PT EVAL LOW COMPLEX 20 MIN: CPT

## 2023-10-11 PROCEDURE — 97165 OT EVAL LOW COMPLEX 30 MIN: CPT

## 2023-10-11 PROCEDURE — 25000003 PHARM REV CODE 250: Performed by: FAMILY MEDICINE

## 2023-10-11 PROCEDURE — 92610 EVALUATE SWALLOWING FUNCTION: CPT

## 2023-10-11 PROCEDURE — 36415 COLL VENOUS BLD VENIPUNCTURE: CPT | Performed by: FAMILY MEDICINE

## 2023-10-11 PROCEDURE — 85610 PROTHROMBIN TIME: CPT | Performed by: FAMILY MEDICINE

## 2023-10-11 RX ORDER — ATORVASTATIN CALCIUM 20 MG/1
20 TABLET, FILM COATED ORAL NIGHTLY
Qty: 30 TABLET | Refills: 0 | Status: SHIPPED | OUTPATIENT
Start: 2023-10-11 | End: 2023-11-10

## 2023-10-11 RX ORDER — VALSARTAN 80 MG/1
160 TABLET ORAL DAILY
Status: DISCONTINUED | OUTPATIENT
Start: 2023-10-11 | End: 2023-10-11 | Stop reason: HOSPADM

## 2023-10-11 RX ORDER — ASPIRIN 81 MG/1
81 TABLET ORAL DAILY
Qty: 30 TABLET | Refills: 0 | Status: SHIPPED | OUTPATIENT
Start: 2023-10-12 | End: 2023-11-11

## 2023-10-11 RX ORDER — VALSARTAN 160 MG/1
160 TABLET ORAL DAILY
Qty: 30 TABLET | Refills: 0 | Status: SHIPPED | OUTPATIENT
Start: 2023-10-11 | End: 2023-11-10

## 2023-10-11 RX ORDER — MEMANTINE HYDROCHLORIDE 5 MG/1
5 TABLET ORAL NIGHTLY
Qty: 30 TABLET | Refills: 0 | Status: SHIPPED | OUTPATIENT
Start: 2023-10-11 | End: 2023-11-10

## 2023-10-11 RX ADMIN — ASPIRIN 81 MG: 81 TABLET, COATED ORAL at 10:10

## 2023-10-11 NOTE — PLAN OF CARE
Discharge orders and chart reviewed. No other discharge needs noted at this time. Pt is clear for discharge from case management. Pt is discharging to brother/POA's home with new home health with [facility]. SOC scheduled for tomorrow, 10/12/2023.    [facility] ordered for NP follow up d/t no appts available at PCP office    OP Case Management ordered       10/11/23 1509   Final Note   Assessment Type Final Discharge Note   Anticipated Discharge Disposition Home-Health   What phone number can be called within the next 1-3 days to see how you are doing after discharge? [phone]   Hospital Resources/Appts/Education Provided Appointment suggestion unavailable   Post-Acute Status   Post-Acute Authorization Home Health   Home Health Status Set-up Complete/Auth obtained   Discharge Delays None known at this time

## 2023-10-11 NOTE — HOSPITAL COURSE
Patient with history of hypertension, brought in due to speech disturbance with confusion.  As per HPI patient was in bank, speech was noted to be garbled with confusion and she was brought to the ED. CT head no hemorrhage, CTA head and neck with 56% left ICA stenosis.  Evaluated by vascular Neurology, NIHSS 3, out of window for tPA.  She was admitted for further evaluation of altered mental status, TIA, versus stroke, versus other.  MRI brain confirming 2.5 x 1.9 cm left meningioma, stable compared to previous.  No acute infarct on MRI, ruling out ischemic CVA.  Patient refused EEG and transthoracic echocardiogram.  Collateral from brother over the phone, patient with progressively worsening memory deficits, he states about 5 days ago he was called by neighbors due to patient wondering and had taken patient to live with him in [place].  He reports over the last 3 days some outpatient managed to take a taxi back to [place].  He states they have visited assisted living facility in the past but patient refused.  He states patient still drives.  Discussed with brother, diagnosis of dementia, need for 24 hour supervision for safety, states he plans on taking patient to live with him, made aware of safety concerns, no driving as well as safety at home. He home medications were restarted in antihypertensives.  Case management also provided resources to brother.  Discussed with Neurology, recommends starting memantine and titrating as able, outpatient psychiatry referral.  Discharge was reviewed with brother over the phone, expressed understanding, no questions or concerns.      Discharge examination  Lying in bed, able to state name, aware in hospital, on room air, moving all 4 extremities

## 2023-10-11 NOTE — PLAN OF CARE
Patient demented - [place] form explained over telephone with patient's POAMoses, v/zenaida and gave verbal consent for [place] form     10/11/23 6996   [place] Message   Medicare Outpatient and Observation Notification regarding financial responsibility Given to patient/caregiver;Explained to patient/caregiver;Signed/date by patient/caregiver   Date [place] was signed 10/11/23   Time [place] was signed [id number]

## 2023-10-11 NOTE — NURSING
Brother @ the bedside -patient has given  sets of keys to the brother and an envelope with cash. Nothing else is in the wallet.

## 2023-10-11 NOTE — PT/OT/SLP EVAL
Occupational Therapy   Evaluation    Name: Shalini Donald  MRN: [id number]  Admitting Diagnosis: CVA (cerebral vascular accident)  Recent Surgery: * No surgery found *      Recommendations:     Discharge Recommendations: nursing facility, skilled vs home health with 24/7 care (patient needs 24/7 care; if she does not have this at home, SNF is recommended)  Discharge Equipment Recommendations:  none  Barriers to discharge:  Other (Comment) (needs 24/7 care due to cognitive deficits)    Assessment:     Shalini Donald is a 89 y.o. female with a medical diagnosis of CVA (cerebral vascular accident).  Performance deficits affecting function: weakness, impaired endurance, impaired self care skills, impaired functional mobility, gait instability, impaired balance, decreased safety awareness, impaired cognition.      Pt presented today with cognitive deficits affecting safe completion of ADLs;  she demonstrated significant impairments in short term memory, attention, and problem solving.  She currently needs 24/7 supervision for safety.      Rehab Prognosis: Fair; patient would benefit from acute skilled OT services to address these deficits and reach maximum level of function.       Plan:     Patient to be seen 5 x/week to address the above listed problems via self-care/home management, therapeutic activities, therapeutic exercises, cognitive retraining  Plan of Care Expires: 11/10/23  Plan of Care Reviewed with: patient    Subjective     Chief Complaint: none stated  Patient/Family Comments/goals: none stated    Occupational Profile:  Pt was a poor historian today; this information should be verified  Living Environment: Lives alone; pt unable to provide details  Previous level of function: per pt, modified independent and driving  Roles and Routines: homemaker; mother to an adult son  Equipment Used at Home: cane, quad  Assistance upon Discharge: unknown     Pain/Comfort:  Pain Rating 1: 0/10  Pain Rating  Post-Intervention 1: 0/10    Objective:     Communicated with: nurse prior to session.  Patient found supine with telemetry upon OT entry to room.    General Precautions: Standard, fall  Respiratory Status: Room air    Occupational Performance:    Bed Mobility:    Patient completed Supine to Sit with contact guard assistance  Patient completed Sit to Supine with contact guard assistance    Functional Mobility/Transfers:  Patient completed Sit <> Stand Transfer with contact guard assistance  with  quad cane   Functional Mobility: CGA-Min A with quad cane; pt often carried the quad cane without placing it on the ground; her movements were impulsive and her poor attention to tasks placed her at a high risk for falls; pt required frequent reminders/redirection to tasks    Activities of Daily Living:  Grooming: contact guard assistance standing at sink; needed moderate verbal cues for task organization and attention  Lower Body Dressing: contact guard assistance seated EOB to don socks    Cognitive/Visual Perceptual:  Cognitive/Psychosocial Skills:     -       Oriented to: Person and Place   -       Follows Commands/attention:Easily distracted and Follows one-step commands  -       Communication: clear/fluent  -       Memory: Impaired STM and Poor immediate recall  -       Safety awareness/insight to disability: impaired   -       Mood/Affect/Coping skills/emotional control: Anxious  Visual/Perceptual:      -Intact      Physical Exam:  Sensation:    -       Intact  Upper Extremity Range of Motion:     -       Right Upper Extremity: WFL  -       Left Upper Extremity: WFL  Upper Extremity Strength:    -       Right Upper Extremity: 4+/5  -       Left Upper Extremity: 4+/5   Strength:    -       Right Upper Extremity: WFL  -       Left Upper Extremity: WFL  Fine Motor Coordination:    -       Intact  Gross motor coordination:   WFL    AMPAC 6 Click ADL:  AMPAC Total Score: 21    Treatment & Education:  Pt educated on OT  role/POC    Patient left HOB elevated with all lines intact, call button in reach, and bed alarm on    GOALS:   Multidisciplinary Problems       Occupational Therapy Goals          Problem: Occupational Therapy    Goal Priority Disciplines Outcome Interventions   Occupational Therapy Goal     OT, PT/OT     Description: Goals to be met by: 11/11/23     Patient will increase functional independence with ADLs by performing:    UE Dressing with Supervision.  LE Dressing with Supervision.  Grooming while standing at sink with Supervision.  Toileting from toilet with Supervision for hygiene and clothing management.   Toilet transfer to toilet with Supervision.                         History:     Past Medical History:   Diagnosis Date    Arthritis     Back pain     Hypertension     Wears glasses     Wears partial dentures     upper         Past Surgical History:   Procedure Laterality Date    CHOLECYSTECTOMY      EYE SURGERY Bilateral     cataracts    JOINT REPLACEMENT Right     knee    KNEE SURGERY         Time Tracking:     OT Date of Treatment: 10/11/23  OT Start Time: 0953  OT Stop Time: 1017  OT Total Time (min): 24 min    Billable Minutes:Evaluation 08  Self Care/Home Management 16    10/11/2023

## 2023-10-11 NOTE — PLAN OF CARE
Pt is refusing her treatment plan and her testing and will be discharged home to her family's care.

## 2023-10-11 NOTE — PROGRESS NOTES
Pharmacist Renal Dose Adjustment Note    Shalini Donald is a 89 y.o. female being treated with the medication lovenox    Patient Data:    Vital Signs (Most Recent):  Temp: 98.3 °F (36.8 °C) (10/10/23 1535)  Pulse: 69 (10/1935)  Resp: 18 (10/1935)  BP: (!) 175/79 (10/10/23 1805)  SpO2: 98 % (10/1935) Vital Signs (72h Range):  Temp:  [98.3 °F (36.8 °C)]   Pulse:  [69-90]   Resp:  [16-35]   BP: (151-189)/(69-81)   SpO2:  [97 %-100 %]      Recent Labs   Lab 10/10/23  1618   CREATININE 1.0     Serum creatinine: 1 mg/dL 10/10/23 1618  Estimated creatinine clearance: 26.8 mL/min    Medication:lovenox dose: 40 mg frequency q24h will be changed to medication:lovenox dose:30 mg frequency:q24h  Reason: CrCl < 30 mL/min    Pharmacist's Name: Ernie Lockhart  Pharmacist's Extension: [phone]

## 2023-10-11 NOTE — NURSING
Nurses Note -- 4 Eyes      10/10/2023   10:51 PM      Skin assessed during: Admit      [x] No Altered Skin Integrity Present    [x]Prevention Measures Documented  Redness note to bilat heel. Mepilex applied    [] Yes- Altered Skin Integrity Present or Discovered   [] LDA Added if Not in Epic (Describe Wound)   [] New Altered Skin Integrity was Present on Admit and Documented in LDA   [] Wound Image Taken    Wound Care Consulted? No    Attending Nurse:  Raquel Emery RN/Staff Member:   [id number]

## 2023-10-11 NOTE — H&P
[facility] Medicine   History & Physical     Patient Name: Shalini Donald  MRN: [id number]  Admission Date: 10/10/2023  3:26 PM  Attending Physician: Demetra Minor MD  Face-to-Face encounter date: 10/10/2023 8:29 PM    Patient information was obtained from patient, past medical records, ER physician, and ER records.     HISTORY OF PRESENT ILLNESS:     Shalini Donald is a 89 y.o. White female   With PMH of HTN,   who presents with confusion.    Onset earlier this morning  Last known well not established  Pt was in bank when staff noted garbled speech  She had been there earlier with nl speech  History limited by AMS    Per family, pt has had progressive memory loss  However this is not her baseline today    REVIEW OF SYSTEMS:     Unable to obtain:  AMS    PAST MEDICAL HISTORY:     Past Medical History:   Diagnosis Date    Arthritis     Back pain     Hypertension     Wears glasses     Wears partial dentures     upper       PAST SURGICAL HISTORY:     Past Surgical History:   Procedure Laterality Date    CHOLECYSTECTOMY      EYE SURGERY Bilateral     cataracts    JOINT REPLACEMENT Right     knee    KNEE SURGERY         ALLERGIES:   Pcn [penicillins] and Tylenol [acetaminophen]    FAMILY HISTORY:     HTN    SOCIAL HISTORY:     Social History     Tobacco Use    Smoking status: Never    Smokeless tobacco: Never   Substance Use Topics    Alcohol use: No        Social History     Substance and Sexual Activity   Sexual Activity Not on file        HOME MEDICATIONS:     Prior to Admission medications    Medication Sig Start Date End Date Taking? Authorizing Provider   aspirin (THERESE ASPIRIN) 325 MG tablet Take 325 mg by mouth once daily.    Provider, Historical   cloNIDine (CATAPRES) 0.1 MG tablet  5/8/18   Provider, Historical   triamcinolone acetonide 0.1% (KENALOG) 0.1 % cream Apply topically 2 (two) times daily.  Patient taking differently: Apply 1 g topically 2 (two) times daily. 9/16/23    "Adair Gutierrez MD   valsartan (DIOVAN) 160 MG tablet Take 1 tablet (160 mg total) by mouth once daily. for 10 days 10/22/22 10/10/23  Ada Leon NP   diphenhydrAMINE (BENADRYL) 25 mg capsule Take 1 capsule (25 mg total) by mouth every 6 (six) hours as needed for Itching or Allergies. 9/18/20 10/10/23  Enrique Rosado MD   doxycycline (MONODOX) 50 MG Cap Take 2 capsules (100 mg total) by mouth every 12 (twelve) hours. 9/18/20 10/10/23  Enrique Rosado MD   hydrocortisone (WESTCORT) 0.2 % cream Apply topically 2 (two) times daily. 7/4/21 10/10/23  Nola Tracy NP   hydrocortisone 1 % cream Apply to affected area 2 times daily 8/5/23 10/10/23  Susana Paniagua DO   mupirocin calcium 2% (BACTROBAN) 2 % cream Apply topically 3 (three) times daily. 6/27/23 10/10/23  Max Baptiste Jr., MD       PHYSICAL EXAM:     BP (!) 175/79   Pulse 69   Temp 98.3 °F (36.8 °C) (Oral)   Resp 18   Ht 5' 1" (1.549 m)   Wt 44.5 kg (98 lb)   SpO2 98%   BMI 18.52 kg/m²     Gen: alert, responsive  HEENT:  Eyes - no pallor  External ears with no lesions  Nares patent  Mouth/Throat:  trachea midline  CV: RRR  Lungs: CTA B/L  Abd: +BS, soft, NT, ND  Ext: no atrophy or edema  Skin: warm, dry  Neuro: alert, responsive, +expressive aphasia, does not follow commands, MAEW  Psych: pleasant    LABS AND IMAGING:     Labs Reviewed   CBC W/ AUTO DIFFERENTIAL - Abnormal; Notable for the following components:       Result Value    MCH 32.4 (*)     Eos # 0.8 (*)     Eosinophil % 11.3 (*)     All other components within normal limits   COMPREHENSIVE METABOLIC PANEL - Abnormal; Notable for the following components:    Potassium 3.4 (*)     Glucose 138 (*)     BUN 24 (*)     eGFR 53.9 (*)     Anion Gap 7 (*)     All other components within normal limits   PROTIME-INR   TSH   LIPID PANEL   B-TYPE NATRIURETIC PEPTIDE   TROPONIN I HIGH SENSITIVITY   URINALYSIS, REFLEX TO URINE CULTURE   HEMOGLOBIN A1C   TROPONIN I HIGH SENSITIVITY "   ISTAT CREATININE   POCT GLUCOSE MONITORING CONTINUOUS       Imaging Results              CTA Head and Neck (xpd) (Final result)  Result time 10/10/23 16:43:52      Final result by Cynthia Piña MD (10/10/23 16:43:52)                   Narrative:    EXAMINATION:  CTA HEAD AND NECK (XPD)    CLINICAL INDICATION: Female, 89 years old. Neuro deficit, acute, stroke suspected    TECHNIQUE: Axial CT angiogram of the head and neck was performed with contrast. Multiplanar reformations as well as 3-D volume rendered imaging were reviewed at the workstation. Degree of stenosis was measured utilizing the NASCET method.    CONTRAST: 100 mL mL of Omnipaque 350 IV.    COMPARISON: None    FINDINGS:  CTA brain: The distal vertebral arteries and basilar artery and cavernous portion of the internal carotid arteries are patent.  The anterior cerebral arteries, middle cerebral arteries and posterior cerebral arteries are widely patent without large vessel occlusion, significant stenosis, aneurysm or AVM  Dural venous sinuses are patent..  Patient has a known well marginated hyperdense ovoid mass along the left tentorium extending into the left occipital and posterior temporal lobe. Measuring 3.0 x 2.2 x 2.0 cm compatible with meningioma      Aortic Arch and Great Vessel Origins: Three-vessel aortic arch  Subclavian Arteries: There is intimal wall thickening with scattered calcified plaque at the origin of the left subclavian artery resulting in less than 50% stenosis. The right subclavian artery is widely patent.  Right Common Carotid Artery: Widely patent.  Right Internal Carotid Artery: Widely patent.  Right External Carotid Artery: Widely patent.  Left Common Carotid Artery: Widely patent.  Left Internal Carotid Artery: Calcified plaque at the carotid bulb and proximal left internal carotid artery resulting in approximately 50-60% stenosis. The mid and distal left internal carotid artery are widely patent  Left External  Carotid Artery: Widely patent.  Right Vertebral Artery: Widely patent.  Left Vertebral Artery: Widely patent.    There are degenerative changes of the spine. The paraspinous soft tissues are normal.  The lung apices are clear.      IMPRESSION:  Normal CTA of the brain    Calcified plaque in the proximal left internal carotid artery resulting in a 56% hemodynamically significant stenosis    No hemodynamically significant stenosis in the right internal carotid artery    3.0 x 2.2 x 2.0 cm well marginated hyperdense ovoid mass along the left tentorium extending into left occipital posterior temporal lobe compatible with meningioma    This exam was performed according to our departmental dose-optimization program which includes automated exposure control, adjustment of the mA and/or kV according to patient size and/or use of iterative reconstruction technique.    Electronically signed by:  Cynthia Piña MD  10/10/2023 04:43 PM CDT Workstation: [id number]                                     CT HEAD FOR STROKE (Final result)  Result time 10/10/23 16:21:32      Final result by Cynthia Piña MD (10/10/23 16:21:32)                   Narrative:    CMS MANDATED QUALITY DATA - CT RADIATION  436    All CT scans at this facility utilize dose modulation, iterative reconstruction, and/or weight based dosing when appropriate to reduce radiation dose to as low as reasonably achievable.  CT head without contrast    Clinical data: Neuro deficit acute, history of meningioma    COMPARISON: 7/29/2023    FINDINGS:    The ventricles and sulci are prominent compatible with generalized cerebral atrophy. There is moderate periventricular low attenuation compatible with chronic small vessel disease.  There is a stable 2.5 x 1.9 cm well marginated ovoid hyperdense mass along the left tentorium within the left temporal occipital region.    There is no hemorrhage or midline shift. There is no surrounding vasogenic edema.  The orbits are  normal.  Paranasal sinuses and mastoid air cells are clear.    IMPRESSION: Age related volume loss with moderate chronic small vessel disease    No acute intervening process    Stable 2.5 x 1.9 cm hyperdense meningioma along the left tentorium extending into the left temporal occipital lobes    These findings were called to the ordering physician at 4:20 PM    Electronically signed by:  Cynthia Piña MD  10/10/2023 04:21 PM CDT Workstation: [id number]                                    Labs and images reviewed personally by me.  See my personal assessment/interpretation of results below:    ASSESSMENT & PLAN:   Shalini Donald is a 89 y.o. female admitted for    Active Hospital Problems    Diagnosis  POA    *Concern for CVA (cerebral vascular accident) [I63.9]  Yes    Hypertension [I10]  Yes      Resolved Hospital Problems   No resolved problems to display.        Plan    Concern for CVA  - stroke protocol  - neuro checks  - PT/OT/SLP  - echo  - asa, statin  - neurology consulted, thank you    Chronic conditions as noted above/below; home medications reviewed personally by me and restarted as appropriate  Electrolyte derangement:  Trending BMP; Mg; replacement prn  DVT ppx: lovenox   FULL CODE      - The above conditions include an acute and/or chronic illness that poses a threat to life (or bodily function).  - Previous notes/encounters/external records reviewed personally by me.  - Pt's case personally discussed with another physician:  ER physician    Demetra Minor MD  [facility] Hospitalist  10/10/2023

## 2023-10-11 NOTE — CONSULTS
"      [facility]  Vascular Neurology  Comprehensive Stroke Center  TeleVascular Neurology Acute Consultation Note      Consults    Consulting Provider: JOSUE FORD  Current Providers  No providers found    Patient Location:  [facility] EMERGENCY DEPARTMENT Emergency Department  Spoke hospital nurse at bedside with patient assisting consultant.     Patient information was obtained from patient.         Assessment/Plan:   Per chart "89 yo female w/ PMHx of HTN, reported progressive memory changes x several months who presents to the ED from the [facility] after she was noted to be confused.   Difficult to establish the timeline, but per  EMS report, patient was at the bank 2 1/2 hours or so prior to arrival at the ED (13:30)  and was in her normal state of health, and then returned to the [facility] w/ noted confusion at which time EMS was called.   Per ED physician's report - patient's family reports progressive memory deficits, but no previus episodes such as this.   No family available at bedside at the moment. "        I was called around 6:14 a.m. when patient was already out of thrombolysis window.      CT head -no acute intra-cranial process. IStable 2.5 x 1.9 cm hyperdense meningioma along the left tentorium extending into the left temporal occipital lobes         Oral aspirin 81 mg, if PO not possible then rectal aspirin 300 mg now.  Head of bed flat, IV Fluids, permissive hypertension  CTA head and neck with and without contrast.  Neuro consult if in house available or transfer for neuro consult  Stroke/TIA work-up with MRI brain, Echo, PT/OT/ Speech and swallow evaluation.        Diagnoses:   Stroke like symptoms    STROKE DOCUMENTATION     Acute Stroke Times:   Acute Stroke Times   Last Known Normal Date: 10/10/23  Last Known Normal Time: 1330  Symptom Onset Date: 10/10/23  Unknown Symptom Onset Time: Unknown Time  Stroke Team Called Date: 10/10/23  Stroke Team Called Time: " "1814  Stroke Team Arrival Date: 10/10/23  Stroke Team Arrival Time: 1814  Thrombolytic Therapy Recommended: No  Thrombectomy Recommended: No    NIH Scale:  1a. Level of Consciousness: 0-->Alert, keenly responsive  1b. LOC Questions: 2-->Answers neither question correctly  1c. LOC Commands: 0-->Performs both tasks correctly  2. Best Gaze: 0-->Normal  3. Visual: 0-->No visual loss  4. Facial Palsy: 0-->Normal symmetrical movements  5a. Motor Arm, Left: 0-->No drift, limb holds 90 (or 45) degrees for full 10 secs  5b. Motor Arm, Right: 0-->No drift, limb holds 90 (or 45) degrees for full 10 secs  6a. Motor Leg, Left: 0-->No drift, leg holds 30 degree position for full 5 secs  6b. Motor Leg, Right: 0-->No drift, leg holds 30 degree position for full 5 secs  7. Limb Ataxia: 0-->Absent  8. Sensory: 0-->Normal, no sensory loss  9. Best Language: 0-->No aphasia, normal  10. Dysarthria: 1-->Mild-to-moderate dysarthria, patient slurs at least some words and, at worst, can be understood with some difficulty  11. Extinction and Inattention (formerly Neglect): 0-->No abnormality  Total (NIH Stroke Scale): 3     Modified Leonie Score: 1  [place] Coma Scale:    ABCD2 Score:    [id number] Score:   HAS -BLED Score:   ICH Score:   Hunt & Knott Classification:       Blood pressure (!) 189/81, pulse 84, temperature 98.3 °F (36.8 °C), temperature source Oral, resp. rate (!) 24, height 5' 1" (1.549 m), weight 44.5 kg (98 lb), SpO2 100 %.  Eligible for thrombolytic therapy?: Yes  Thrombolytic therapy recomended: Thrombolytic therapy not recommended due to Outside of treatment window   Possible Interventional Revascularization Candidate? No; No large vessel occlusion identified on imaging     Disposition Recommendation: admit to inpatient    Subjective:     History of Present Illness:  Per chart "88 yo female w/ PMHx of HTN, reported progressive memory changes x several months who presents to the ED from the bank after she was noted to be " "confused.   Difficult to establish the timeline, but per  EMS report, patient was at the bank 2 1/2 hours or so prior to arrival at the ED (13:30)  and was in her normal state of health, and then returned to the [facility] w/ noted confusion at which time EMS was called.   Per ED physician's report - patient's family reports progressive memory deficits, but no previus episodes such as this.   No family available at bedside at the moment. "        I was called around 6:14 a.m. when patient was already out of thrombolysis window        Woke up with symptoms?: no    Recent bleeding noted: no  Does the patient take any Blood Thinners? no  Medications: No relevant medications      Past Medical History: hypertension    Past Surgical History: no major surgeries within the last 2 weeks    Family History: no relevant history    Social History: unable to obtain    Allergies: Pcn [Penicillins]  Tylenol [Acetaminophen] No relevant allergies    Review of Systems  Objective:   Vitals: Blood pressure (!) 151/69, pulse 84, temperature 98.3 °F (36.8 °C), temperature source Oral, resp. rate 16, height 5' 1" (1.549 m), weight 44.5 kg (98 lb), SpO2 99 %. BP: 151/69    CT READ: Yes  No hemmorhage. No mass effect. No early infarct signs.     Physical Exam          Recommended the emergency room physician to have a brief discussion with the patient and/or family if available regarding the  risks and benefits of treatment, and to briefly document the occurrence of that discussion in his clinical encounter note.     The attending portion of this evaluation, treatment, and documentation was performed per Latisha Perkins MD via audio only.    Billing code: No LOS (audio only consult)    In your opinion, this was a: Tier 1 Van Negative    Consult End Time: 7:12 PM     Latisha Perkins MD  Comprehensive Stroke Center  Vascular Neurology   [facility]    "

## 2023-10-11 NOTE — PT/OT/SLP EVAL
"Speech Language Pathology Evaluation  Cognitive/Bedside Swallow    Patient Name:  Shalini Donald   MRN:  [id number]  Admitting Diagnosis: CVA (cerebral vascular accident)    Recommendations:                  General Recommendations:  Cognitive-linguistic therapy  Diet recommendations:  Regular Diet - IDDSI Level 7, Thin liquids - IDDSI Level 0   Aspiration Precautions: Assistance with tray set up, HOB to 90 degrees, and Standard aspiration precautions   General Precautions: Standard,    Communication strategies:   Speak loudly, as pt [place]    Assessment:     Shalini Donald is a 89 y.o. female with an SLP diagnosis of Cognitive-Linguistic Impairment.  She presents with impaired problem solving, orientation, memory, and attention. Cognitive deficits impacting pt's safety, as she reports decreased desire to drive and complete daily tasks due to reports of feeling "it's too dangerous." Rec ST during admit and at next level of care. Pt would benefit from SNF v. 24 hour care.    History:   Per H&P:  "Shalini Donald is a 89 y.o. White female   With PMH of HTN,   who presents with confusion.     Onset earlier this morning  Last known well not established  Pt was in bank when staff noted garbled speech  She had been there earlier with nl speech  History limited by AMS     Per family, pt has had progressive memory loss  However this is not her baseline today"  Past Medical History:   Diagnosis Date    Arthritis     Back pain     Hypertension     Wears glasses     Wears partial dentures     upper       Past Surgical History:   Procedure Laterality Date    CHOLECYSTECTOMY      EYE SURGERY Bilateral     cataracts    JOINT REPLACEMENT Right     knee    KNEE SURGERY         Social History: Patient lives with her brother and sister-in-law as of ~2 weeks ago.    Prior Intubation HX:  none this admit    Modified Barium Swallow: none found in epic or reported by pt    MRI:   Imaging Results              X-Ray Chest 1 View (Final result) "  Result time 10/11/23 07:14:25      Final result by Karlos Mccormick MD (10/11/23 07:14:25)                   Narrative:    Chest single view    Clinical data:Altered mental status.    FINDINGS: AP view of the chest demonstrates no cardiac, pulmonary, or acute osseous abnormalities.    IMPRESSION:  1. No acute process.    Electronically signed by:  Karlos Mccormick MD  10/11/2023 07:14 AM CDT Workstation: [id number]                                     CTA Head and Neck (xpd) (Final result)  Result time 10/10/23 16:43:52      Final result by Cynthia Piña MD (10/10/23 16:43:52)                   Narrative:    EXAMINATION:  CTA HEAD AND NECK (XPD)    CLINICAL INDICATION: Female, 89 years old. Neuro deficit, acute, stroke suspected    TECHNIQUE: Axial CT angiogram of the head and neck was performed with contrast. Multiplanar reformations as well as 3-D volume rendered imaging were reviewed at the workstation. Degree of stenosis was measured utilizing the NASCET method.    CONTRAST: 100 mL mL of Omnipaque 350 IV.    COMPARISON: None    FINDINGS:  CTA brain: The distal vertebral arteries and basilar artery and cavernous portion of the internal carotid arteries are patent.  The anterior cerebral arteries, middle cerebral arteries and posterior cerebral arteries are widely patent without large vessel occlusion, significant stenosis, aneurysm or AVM  Dural venous sinuses are patent..  Patient has a known well marginated hyperdense ovoid mass along the left tentorium extending into the left occipital and posterior temporal lobe. Measuring 3.0 x 2.2 x 2.0 cm compatible with meningioma      Aortic Arch and Great Vessel Origins: Three-vessel aortic arch  Subclavian Arteries: There is intimal wall thickening with scattered calcified plaque at the origin of the left subclavian artery resulting in less than 50% stenosis. The right subclavian artery is widely patent.  Right Common Carotid Artery: Widely patent.  Right  Internal Carotid Artery: Widely patent.  Right External Carotid Artery: Widely patent.  Left Common Carotid Artery: Widely patent.  Left Internal Carotid Artery: Calcified plaque at the carotid bulb and proximal left internal carotid artery resulting in approximately 50-60% stenosis. The mid and distal left internal carotid artery are widely patent  Left External Carotid Artery: Widely patent.  Right Vertebral Artery: Widely patent.  Left Vertebral Artery: Widely patent.    There are degenerative changes of the spine. The paraspinous soft tissues are normal.  The lung apices are clear.      IMPRESSION:  Normal CTA of the brain    Calcified plaque in the proximal left internal carotid artery resulting in a 56% hemodynamically significant stenosis    No hemodynamically significant stenosis in the right internal carotid artery    3.0 x 2.2 x 2.0 cm well marginated hyperdense ovoid mass along the left tentorium extending into left occipital posterior temporal lobe compatible with meningioma    This exam was performed according to our departmental dose-optimization program which includes automated exposure control, adjustment of the mA and/or kV according to patient size and/or use of iterative reconstruction technique.    Electronically signed by:  Cynthia Piña MD  10/10/2023 04:43 PM CDT Workstation: [id number]                                     CT HEAD FOR STROKE (Final result)  Result time 10/10/23 16:21:32      Final result by Cynthia Piña MD (10/10/23 16:21:32)                   Narrative:    CMS MANDATED QUALITY DATA - CT RADIATION  436    All CT scans at this facility utilize dose modulation, iterative reconstruction, and/or weight based dosing when appropriate to reduce radiation dose to as low as reasonably achievable.  CT head without contrast    Clinical data: Neuro deficit acute, history of meningioma    COMPARISON: 7/29/2023    FINDINGS:    The ventricles and sulci are prominent compatible with  generalized cerebral atrophy. There is moderate periventricular low attenuation compatible with chronic small vessel disease.  There is a stable 2.5 x 1.9 cm well marginated ovoid hyperdense mass along the left tentorium within the left temporal occipital region.    There is no hemorrhage or midline shift. There is no surrounding vasogenic edema.  The orbits are normal.  Paranasal sinuses and mastoid air cells are clear.    IMPRESSION: Age related volume loss with moderate chronic small vessel disease    No acute intervening process    Stable 2.5 x 1.9 cm hyperdense meningioma along the left tentorium extending into the left temporal occipital lobes    These findings were called to the ordering physician at 4:20 PM    Electronically signed by:  Cynthia Piña MD  10/10/2023 04:21 PM CDT Workstation: [id number]                                    Prior diet: Regular textures and thin liquids.    Occupation/hobbies/homemaking: Pt drives, picks up food from restaurants rather than cooking, and does simple cleaning/straightening up around her home. She reports she feels she should stop driving b/c she does not feel safe anymore.    Subjective     Pt awake finishing OT evaluation. Pt agreeable to ST evaluation.  Patient goals: none stated     Pain/Comfort:       Respiratory Status: Room air    Objective:   Informal assessment of cognition due to hearing loss impacting formal evaluation.    Cognitive Status:    Arousal/Alertness Appropriate response to stimuli  Attention Alternating attention deficit  impaired and Sustained attention deficit impaired  Orientation Person and Place  Memory Immediate Recall impaired and Delayedimpaired  Problem Solving Solutions 3/5 correct in verbal scenarios; impaired       Receptive Language:   Comprehension:      Questions Simple yes/no WFL  Open ended questions suspected mild impairments; hearing impacting understanding of information  Commands  One step impaired  two step basic  commands impaired  Object identifications 4/5 in Fo environment  Conversation WFL    Pragmatics:    Appropriate    Expressive Language:  Verbal:    Initiation WFL  Repetition Words impaired  Naming Confrontation WFL and Single word responsive naming WFL  Conversational speech WFL      Motor Speech:  WFL    Voice:     Adequate for age, sex, size.    Visual-Spatial:  Suspected R neglect during written field of vision; however, unable to determine extent due to limited assessment; pt able to scan environment adequately w/o sights of visual neglect    Reading:   Word WFL      Written Expression:   Mild-moderate impairment in legibility; able to write prompted information accurately when given min cues; missing 1 word in prompted sentence and city/state/zip code for address    Oral Musculature Evaluation  Oral Musculature: WFL  Dentition: present and adequate, partials  Secretion Management: adequate  Mucosal Quality: adequate  Mandibular Strength and Mobility: WFL  Oral Labial Strength and Mobility: impaired pursing, impaired seal, functional retraction, functional coordination  Lingual Strength and Mobility: WFL  Velar Elevation: WFL  Buccal Strength and Mobility: WFL  Volitional Cough: elicited; adequate  Volitional Swallow: palpated; adequate laryngeal movement  Voice Prior to PO Intake: clear    Bedside Swallow Eval:   Consistencies Assessed:  Thin liquids water via cup and straw  Puree tsp bites applesauce  Mixed consistencies tsp bites diced peaches in thin juice  Solids cracker      Oral Phase:   WFL    Pharyngeal Phase:   no overt clinical signs/symptoms of aspiration  no overt clinical signs/symptoms of pharyngeal dysphagia    Compensatory Strategies  None    Treatment: Pt educated re purpose of evaluation, role of SLP, results of evaluation, and recs. Pt expressed understanding of recs. Recs shared w/ nursing and MD.      Goals:   Multidisciplinary Problems       SLP Goals          Problem: SLP    Goal  Priority Disciplines Outcome   SLP Goal     SLP Ongoing, Progressing   Description: 1. Pt will complete problem solving tasks w/ 70% accuracy given mod cuing  2. Pt will follow simple 1-2 step directives w/ 80% accuracy given min cues  3. Pt will complete sustained attention and delayed recall tasks w/ 70% accuracy given min cues                       Plan:     Patient to be seen:  3 x/week   Plan of Care expires:     Plan of Care reviewed with:  patient, other (see comments) (nursing, MD)   SLP Follow-Up:  Yes       Discharge recommendations:  Discharge Facility/Level of Care Needs: nursing facility, skilled   Barriers to Discharge:  Level of Skilled Assistance Needed   and Safety Awareness      Time Tracking:     SLP Treatment Date:   10/11/23  Speech Start Time:  1010  Speech Stop Time:  1041     Speech Total Time (min):  31 min    Billable Minutes: Eval Speech/cognitive-linguistic 16 min  and Eval Swallow and Oral Function 15 min    10/11/2023

## 2023-10-11 NOTE — PLAN OF CARE
Problem: SLP  Goal: SLP Goal  Description: 1. Pt will complete problem solving tasks w/ 70% accuracy given mod cuing  2. Pt will follow simple 1-2 step directives w/ 80% accuracy given min cues  3. Pt will complete sustained attention and delayed recall tasks w/ 70% accuracy given min cues  Outcome: Ongoing, Progressing     CSE and cognitive-linguistic evaluation completed. Rec regular diet and thin liquids. Cognitive-linguistic deficits noted upon limited evaluation; rec ST to address cognitive-linguistic deficits. Pt would benefit from SNF v. 24 hour care at d'; would benefit from ST at d'.

## 2023-10-11 NOTE — ED NOTES
PT TO CT.  NEURO MD CALLS VIDEO CHAT PRIOR TO CT. REMAINS CONFUSED AND UNABLE TO ANSWER QUESTIONS.

## 2023-10-11 NOTE — PROGRESS NOTES
[facility]  Adult Nutrition   Progress Note (Initial Assessment)     SUMMARY     Recommendations  Recommendation/Intervention:  1. Recommend libralizing diet to encourage PO intake   2. Adding IC with Unjury daily.   3. Adding Ensure + HP BID    Goals:   1. Pt to achieve >75% EPN.   2. Pt to achieve > 75 EEN. 3. Pt to tolerate intake  Nutrition Goal Status: new    Dietitian Rounds Brief  Pt is an 89 y/o hx including dementia w/ behavioral disturbance seen for initial assessment per trigger for low BMI. Ms. Donald was seen bedside with additional information obtained via RN and Epic record.    Pt intake has been fair-poor since admission, with variance between meals. Ms. Donald states she has not been very hungry since arrival. Her tolerance of meals has been good.      Per Epic record, Ms. Donald has lost 30 lbs since 10/22, with 10lbs of weight loss occurring in the last month. Visual assessment revealed mild to moderate wasting of the orbital, buccal, and temporal regions. Full NFPE not performed d/t altered mental status. Tese findings (10%+ weight loss in 1 month, 25% WL in 1 year, and physical findings of mild-moderate muscle and fat wasting), support a diagnosis of moderate to severe malnutrition.     Recommend liberalizing diet to regular in order to encourage more consistent PO intake. Adding ice cream with unjury daily, and Ensure + HP BID to assist in meeting energy and PRO needs.    Will follow and make further recs prn.     Diet order:   Current Diet Order: Cardiac      Evaluation of Received Nutrient/Fluid Intake  Energy Calories Required: not meeting needs  Protein Required: not meeting needs  Tolerance: tolerating     % Intake of Estimated Energy Needs: 50%  % Meal Intake: 50 - 75 %      Intake/Output Summary (Last 24 hours) at 10/11/2023 3464  Last data filed at 10/11/2023 0951  Gross per 24 hour   Intake 1080 ml   Output 775 ml   Net 305 ml        Anthropometrics  Temp: 97.9 °F  "(36.6 °C)  Height Method: Stated  Height: 5' 1" (154.9 cm)  Height (inches): 61 in  Weight Method: Bed Scale  Weight: 40.8 kg (89 lb 15.2 oz)  Weight (lb): 89.95 lb  Ideal Body Weight (IBW), Female: 105 lb  % Ideal Body Weight, Female (lb): 85.67 %  BMI (Calculated): 17  Usual Body Weight (UBW), k.5 kg (Per weight recorded on 10/22--steady decline since this date)  % Usual Body Weight: 75.02       Estimated/Assessed Needs  Weight Used For Calorie Calculations: 40.8 kg (89 lb 15.2 oz)  Energy Calorie Requirements (kcal): 3467-1827 kcal     Protein Requirements: 60-80g pro  Weight Used For Protein Calculations: 40.8 kg (89 lb 15.2 oz)     Estimated Fluid Requirement Method: RDA Method  RDA Method (mL): 1450       Reason for Assessment  Reason For Assessment: identified at risk by screening criteria (low bmi)  Diagnosis: other (see comments) (dementia with behavioral disturbance)  Relevant Medical History: Concern for CVA, HTN    Nutrition/Diet History  Food Allergies: other (see comments) (unable to assess)  Factors Affecting Nutritional Intake: early satiety, decreased appetite    Nutrition Risk Screen  Nutrition Risk Screen: no indicators present     MST Score: 0  Have you recently lost weight without trying?: No  Weight loss score: 0  Have you been eating poorly because of a decreased appetite?: No  Appetite score: 0       Weight History:  Wt Readings from Last 5 Encounters:   10/10/23 40.8 kg (89 lb 15.2 oz)   23 44.5 kg (98 lb)   23 45.4 kg (100 lb)   23 45.4 kg (100 lb)   23 45.4 kg (100 lb)        Medications:Pertinent Medications Reviewed  Scheduled Meds:   aspirin  243 mg Oral Once    aspirin  81 mg Oral Daily    atorvastatin  40 mg Oral QHS    enoxparin  30 mg Subcutaneous Daily    valsartan  160 mg Oral Daily     Continuous Infusions:  PRN Meds:.albuterol-ipratropium, dextrose 50%, dextrose 50%, glucagon (human recombinant), glucose, glucose, hydrALAZINE, hydrALAZINE, melatonin, " naloxone, ondansetron, polyethylene glycol, senna-docusate 8.6-50 mg, simethicone, sodium chloride 0.9%, sodium chloride 0.9%, traMADoL    Labs: Pertinent Labs Reviewed  Clinical Chemistry:  Recent Labs   Lab 10/10/23  1618 10/11/23  0454    139  139   K 3.4* 3.8  3.8    106  106   CO2 28 26  26   * 91  91   BUN 24* 14  14   CREATININE 1.0 0.6  0.6   CALCIUM 9.6 9.6  9.6   PROT 7.0 7.0   ALBUMIN 4.1 4.1   BILITOT 1.0 0.9   ALKPHOS 56 56   AST 24 24   ALT 21 21   ANIONGAP 7* 7*  7*   MG  --  1.8   PHOS  --  2.3*     CBC:   Recent Labs   Lab 10/11/23  0454   WBC 7.70   RBC 4.21   HGB 13.6   HCT 40.2      MCV 96   MCH 32.3*   MCHC 33.8     Lipid Panel:  Recent Labs   Lab 10/10/23  1618   CHOL 162   HDL 72   LDLCALC 67.8   TRIG 111   CHOLHDL 44.4     Cardiac Profile:  Recent Labs   Lab 10/10/23  1618 10/11/23  0454   BNP 27  --    CPKMB  --  5.5       Diabetes:  Recent Labs   Lab 10/10/23  2154   HGBA1C 5.7     Thyroid & Parathyroid:  Recent Labs   Lab 10/10/23  1618   TSH 1.820       Monitor and Evaluation  Food and Nutrient Intake: energy intake, food and beverage intake  Food and Nutrient Adminstration: diet order  Knowledge/Beliefs/Attitudes: food and nutrition knowledge/skill, beliefs and attitudes  Physical Activity and Function: nutrition-related ADLs and IADLs, factors affecting access to physical activity  Anthropometric Measurements: weight, weight change, body mass index, growth pattern indices/percentile ranks  Biochemical Data, Medical Tests and Procedures: electrolyte and renal panel, gastrointestinal profile, glucose/endocrine profile, inflammatory profile, lipid profile  Nutrition-Focused Physical Findings: overall appearance     Nutrition Risk  Level of Risk/Frequency of Follow-up: high     Nutrition Follow-Up  RD Follow-up?: Yes      Horacio Hendrix Provisional Licensed Dietitian 10/11/2023 3:59 PM

## 2023-10-11 NOTE — PLAN OF CARE
Problem: Oral Intake Inadequate  Goal: Improved Oral Intake  Outcome: Ongoing, Progressing  Intervention: Promote and Optimize Oral Intake  Flowsheets (Taken 10/11/2023 1615)  Oral Nutrition Promotion:   calorie-dense foods provided   calorie-dense liquids provided

## 2023-10-11 NOTE — HPI
Shalini Donald is a 89 y.o. White female   With PMH of HTN,   who presents with confusion.     Onset earlier this morning  Last known well not established  Pt was in bank when staff noted garbled speech  She had been there earlier with nl speech  History limited by AMS     Per family, pt has had progressive memory loss  However this is not her baseline today

## 2023-10-11 NOTE — PT/OT/SLP EVAL
"Physical Therapy Evaluation    Patient Name:  Shalini Donald   MRN:  [id number]    Recommendations:     Discharge Recommendations: home health PT (pt should not be living alone due to poor memory; HH PT w/ 24/7 sup)   Discharge Equipment Recommendations:  (TBD)   Barriers to discharge: Decreased caregiver support and poor memory/safety awareness    Assessment:     Shalini Donald is a 89 y.o. female admitted with a medical diagnosis of Dementia with behavioral disturbance.  She presents with the following impairments/functional limitations: weakness, impaired endurance, impaired functional mobility, gait instability, impaired balance, decreased safety awareness, impaired cognition.    Pt found HOB elevated and reluctantly agreeable to working with PT. Pt A & O 2 and has the following co-morbidities: HTN, L TKA '17.  Pt tolerated session fairly and required CGA to close Sup A for safe mobilization during session today. Pt would benefit from acute PT during hospitalization to increase strength, endurance and safety with mobility and would benefit from HH PT and close 24/7 Supervision upon discharge home.      Rehab Prognosis: Fair; patient would benefit from acute skilled PT services to address these deficits and reach maximum level of function.    Recent Surgery: * No surgery found *      Plan:     During this hospitalization, patient to be seen 5 x/week to address the identified rehab impairments via gait training, therapeutic activities, therapeutic exercises and progress toward the following goals:    Plan of Care Expires:  11/15/23    Subjective     Chief Complaint: Pt was agreeable to PT session for "getting some exercise."   Patient/Family Comments/goals: HH PT w/ close supervision from family  Pain/Comfort:  Pain Rating 1: 0/10  Pain Rating Post-Intervention 1: 0/10    Patients cultural, spiritual, Yarsani conflicts given the current situation:      Living Environment:  Pt typically lives alone in a [facility] with " no steps to enter.  Prior to admission, patients level of function was independent to MI with QC.  Equipment used at home: cane, quad.  DME owned (not currently used): none.  Upon discharge, patient will have assistance from her brother.    Objective:     Communicated with MONROE Jeronimo prior to session.  Patient found HOB elevated with bed alarm, peripheral IV, telemetry  upon PT entry to room.    General Precautions: Standard, fall  Orthopedic Precautions:N/A   Braces: N/A  Respiratory Status: Room air    Exams:  Cognitive Exam:  Patient is oriented to Person and Place  RLE ROM: WFL  LLE ROM: WFL    Functional Mobility:  Bed Mobility:     Scooting: stand by assistance  Supine to Sit: stand by assistance  Sit to Supine: stand by assistance  Transfers:     Sit to Stand:  stand by assistance and contact guard assistance with no AD and hand-held assist  Gait: x 200' with RW and CGA for safety and vc for steering/direction.       AM-PAC 6 CLICK MOBILITY  Total Score:21       Treatment & Education:  Pt was educated on the following: call light use, importance of OOB activity and functional mobility to negate the negative effects of prolonged bed rest during this hospitalization, safe transfers/ambulation and discharge planning recommendations/options.      Patient left HOB elevated with all lines intact, call button in reach, bed alarm on, and extender Marielle present.    GOALS:   Multidisciplinary Problems       Physical Therapy Goals          Problem: Physical Therapy    Goal Priority Disciplines Outcome Goal Variances Interventions   Physical Therapy Goal     PT, PT/OT      Description: Goals to be met by: 11/15/23    Patient will increase functional independence with mobility by performin. Supine to sit with Supervision  2. Sit to stand transfer with Supervision  3. Bed to chair transfer with Supervision using Rolling Walker  4. Gait  x 300 feet with Supervision using Rolling Walker.                               History:     Past Medical History:   Diagnosis Date    Arthritis     Back pain     Hypertension     Wears glasses     Wears partial dentures     upper       Past Surgical History:   Procedure Laterality Date    CHOLECYSTECTOMY      EYE SURGERY Bilateral     cataracts    JOINT REPLACEMENT Right     knee    KNEE SURGERY         Time Tracking:     PT Received On: 10/11/23  PT Start Time: 1116     PT Stop Time: 1132  PT Total Time (min): 16 min     Billable Minutes: Evaluation 8 and Gait Training 8      10/11/2023

## 2023-10-11 NOTE — PLAN OF CARE
[facility]  Initial Discharge Assessment       Primary Care Provider: Kali Mccullough MD    Admission Diagnosis: CVA (cerebral vascular accident) [I63.9]    Admission Date: 10/10/2023  Expected Discharge Date: 10/11/2023    Discharge assessment completed via telephone with patient's brother/POA Moses Dunham. Patient currently staying with brother in [place], [place]. No HH/HD/coumadin. DME at home is listed below. Discharge plan is home with brother and new home health. CM following for additional needs      Transition of Care Barriers: Mental illness (dementia)    Payor: MEDICARE / Plan: MEDICARE PART A & B / Product Type: Government /     Extended Emergency Contact Information  Primary Emergency Contact: Moses Donald  Address: [address]           [place] [address] [address] [place]  Home Phone: [phone]  Relation: Son  Secondary Emergency Contact: Moses Omer  Home Phone: [phone]  Mobile Phone: [phone]  Relation: Brother  Preferred language: English   needed? No    Discharge Plan A: Home with family, Home Health  Discharge Plan B: Home Health      [id number] [place] [facility] [address] AT [facility] [address]  [address]  [facility] [address]  Phone: [phone] Fax: [phone]    [facility]  [address]  [facility] [address]  Phone: [phone] Fax: [phone]      Initial Assessment (most recent)       Adult Discharge Assessment - 10/11/23 1504          Discharge Assessment    Assessment Type Discharge Planning Assessment     Confirmed/corrected address, phone number and insurance Yes     Confirmed Demographics Correct on Facesheet     Source of Information family     Does patient/caregiver understand observation status Yes     Communicated CRISTINA with patient/caregiver Yes     Reason For Admission concern for CVA     People in Home --   currently went to stay with Moses woodard, one week ago    Do  you expect to return to your current living situation? Yes     Do you have help at home or someone to help you manage your care at home? Yes     Who are your caregiver(s) and their phone number(s)? SHAGUFTA Lopes/brother     Prior to hospitilization cognitive status: Alert/Oriented     Current cognitive status: Not Oriented to Time     Equipment Currently Used at Home cane, quad     Readmission within 30 days? No     Patient currently being followed by outpatient case management? No     Do you currently have service(s) that help you manage your care at home? No     Do you take prescription medications? Yes     Do you have prescription coverage? Yes     Coverage medicare/ for life     Do you have any problems affording any of your prescribed medications? No     Is the patient taking medications as prescribed? yes     Who is going to help you get home at discharge? Moses     How do you get to doctors appointments? family or friend will provide     Are you on dialysis? No     Do you take coumadin? No     DME Needed Upon Discharge  none     Discharge Plan discussed with: Sibling     Transition of Care Barriers Mental illness   dementia    Discharge Plan A Home with family;Home Health     Discharge Plan B Home Health

## 2023-10-11 NOTE — PLAN OF CARE
Problem: Adult Inpatient Plan of Care  Goal: Plan of Care Review  Outcome: Ongoing, Progressing  Goal: Patient-Specific Goal (Individualized)  Outcome: Ongoing, Progressing  Goal: Absence of Hospital-Acquired Illness or Injury  Outcome: Ongoing, Progressing     Problem: Adjustment to Illness (Stroke, Ischemic/Transient Ischemic Attack)  Goal: Optimal Coping  Outcome: Ongoing, Progressing     Problem: Cognitive Impairment (Stroke, Ischemic/Transient Ischemic Attack)  Goal: Optimal Cognitive Function  Outcome: Ongoing, Progressing     Problem: Fall Injury Risk  Goal: Absence of Fall and Fall-Related Injury  Outcome: Ongoing, Progressing

## 2023-10-11 NOTE — NURSING
Patient was assisted to the bedside commode then asked for her purse. Patient's purse was found on the floor beside the bed. Patient was looking for money in her purse. Patient found a white bank envelope containing $202. Patient asked could her brother be called in the morning to  her money. Patient agreed. Moses Dunham will be called prior to end of shift

## 2023-10-11 NOTE — PLAN OF CARE
Goals to be met by: 11/15/23    Patient will increase functional independence with mobility by performin. Supine to sit with Supervision  2. Sit to stand transfer with Supervision  3. Bed to chair transfer with Supervision using Rolling Walker  4. Gait  x 300 feet with Supervision using Rolling Walker.

## 2023-10-11 NOTE — NURSING
Pt refused for the EEG tech to perform test-discussed and explained -patient states its all about the money they just want money.

## 2023-10-11 NOTE — CONSULTS
"[facility]  Neurology Consult    Patient Name: Shalini Donald  MRN: [id number]  : 1934  TODAY'S DATE: 10/11/2023  ADMIT DATE: 10/10/2023  3:26 PM                                          CONSULTED PROVIDER: Ysabel Traore MD, Neurologist. On-call Phone: [phone]  CONSULT REQUESTED BY: Florencia Valadez MD     Chief Complaint   Patient presents with    Altered Mental Status       HPI per EMR:  Shalini Donald is a 89 y.o. White female   With PMH of HTN,   who presents with confusion.     Onset earlier this morning  Last known well not established  Pt was in bank when staff noted garbled speech  She had been there earlier with nl speech  History limited by AMS     Per family, pt has had progressive memory loss  However this is not her baseline today     Neurology Consult:  Patient was seen and examined by me today. She is an 89 yo woman with history of HTN and left temporal meningioma who presented to the ED with altered mental status. HPI per chart review, since patient unable to provide reliable history. Patient was in the bank when the staff noticed slurred speech and word finding difficulty, so EMS was called. Family reported progressive memory loss, and they think she may have dementia. Upon my interview, patient was severely hard of hearing, and seemed upset to be in the hospital. She refused to undergo EEG because "then I will have to pay for it", and continued to refuse despite explaining to her that Medicare would be responsible for covering the costs. She seemed also confused about why she was admitted, but states that she had another similar event a week ago, but this one was more severe. She did not seek medical help for the first event. She denies focal weakness, numbness, vision loss, or other neurological deficit.     Review of Systems:    A comprehensive ROS was performed and all systems were negative except as noted above in HPI.    Past Medical History:  has a past " medical history of Arthritis, Back pain, Hypertension, Wears glasses, and Wears partial dentures.    Past Surgical History:  has a past surgical history that includes Joint replacement (Right); Cholecystectomy; Eye surgery (Bilateral); and Knee surgery.    Family Medical History: family history is not on file.    Social History:  reports that she has never smoked. She has never used smokeless tobacco. She reports that she does not drink alcohol and does not use drugs.    PCP: Kali Mccullough MD    Allergies:   Review of patient's allergies indicates:   Allergen Reactions    Pcn [penicillins] Rash    Tylenol [acetaminophen] Other (See Comments)     headaches       Medications:   No current facility-administered medications on file prior to encounter.     Current Outpatient Medications on File Prior to Encounter   Medication Sig Dispense Refill    aspirin (THERESE ASPIRIN) 325 MG tablet Take 325 mg by mouth once daily.      cloNIDine (CATAPRES) 0.1 MG tablet   0    triamcinolone acetonide 0.1% (KENALOG) 0.1 % cream Apply topically 2 (two) times daily. (Patient taking differently: Apply 1 g topically 2 (two) times daily.) 80 g 0    valsartan (DIOVAN) 160 MG tablet Take 1 tablet (160 mg total) by mouth once daily. for 10 days 10 tablet 0     Scheduled Meds:   aspirin  243 mg Oral Once    aspirin  81 mg Oral Daily    atorvastatin  40 mg Oral QHS    enoxparin  30 mg Subcutaneous Daily    valsartan  160 mg Oral Daily     Continuous Infusions:  PRN Meds:.albuterol-ipratropium, dextrose 50%, dextrose 50%, glucagon (human recombinant), glucose, glucose, hydrALAZINE, hydrALAZINE, melatonin, naloxone, ondansetron, polyethylene glycol, senna-docusate 8.6-50 mg, simethicone, sodium chloride 0.9%, sodium chloride 0.9%, traMADoL      Physical Exam  Current Vitals:  Vitals:    10/11/23 0410   BP: (!) 140/67   Pulse: 62   Resp: 20   Temp: 97.7 °F (36.5 °C)       Physical Exam:  General: awake and alert  HEENT: PERRL, EOMI  CV:  RRR  Lungs: no respiratory distress  Abdomen: soft    Neurological Exam    MENTAL STATUS EXAM:  Patient awake, alert, oriented to self and place, not to time (said 1964) or situation. Naming and repetition seem intact, but comprehension seems impaired, party because severe hearing loss.     CRANIAL NERVE EXAM:  II/III: fundoscopic exam deferred, PERRL; blinks to threat bilaterally  III/IV/VI: EOMI  V: no deficits appreciated to light touch  VII: no facial asymmetry noted  VIII: severe hearing loss  IX/X: palate @ ML and raises symmetrically  XI: shoulder shrug 5/5 bilaterally  XII: tongue to midline w/out asymmetry    MOTOR EXAM:  Bulk and Tone: decreased bulk, normal tone.  Strength is 5/5 proximally and distally in upper and lower extremities without deficits.    REFLEXES:  2+ in bilateral upper and lower extremities.    SENSORY EXAM  Light touch intact throughout in upper and lower extremities without deficits.    COORDINATION/CEREBELLAR EXAM:  FTN: no dysmetria or other signs of appendicular ataxia  HTS: unable to perform    GAIT:  Deferred for safety.    Laboratory Data & Studies    Recent Labs   Lab 10/10/23  1618 10/11/23  0454   WBC 7.42 7.70   HGB 13.0 13.6    263   MCV 95 96       Recent Labs   Lab 10/10/23  1618 10/11/23  0454    139  139   K 3.4* 3.8  3.8    106  106   CO2 28 26  26   BUN 24* 14  14   * 91  91   CALCIUM 9.6 9.6  9.6   MG  --  1.8   PHOS  --  2.3*       Recent Labs   Lab 10/10/23  1618 10/11/23  0454   PROT 7.0 7.0   ALBUMIN 4.1 4.1   BILITOT 1.0 0.9   AST 24 24   ALT 21 21   ALKPHOS 56 56       Recent Labs   Lab 10/10/23  1618 10/11/23  0454   INR 1.0 1.1   APTT  --  27.1       Recent Labs   Lab 10/10/23  1618   CHOL 162   TRIG 111   LDLCALC 67.8   HDL 72   TSH 1.820         Microbiology:  Microbiology Results (last 7 days)       ** No results found for the last 168 hours. **              Imaging:  X-Ray Chest 1 View    Result Date: 10/11/2023  Chest  single view Clinical data:Altered mental status. FINDINGS: AP view of the chest demonstrates no cardiac, pulmonary, or acute osseous abnormalities. IMPRESSION: 1. No acute process. Electronically signed by:  Karlos Mccormick MD  10/11/2023 07:14 AM CDT Workstation: [id number]    CTA Head and Neck (xpd)    Result Date: 10/10/2023  EXAMINATION: CTA HEAD AND NECK (XPD) CLINICAL INDICATION: Female, 89 years old. Neuro deficit, acute, stroke suspected TECHNIQUE: Axial CT angiogram of the head and neck was performed with contrast. Multiplanar reformations as well as 3-D volume rendered imaging were reviewed at the workstation. Degree of stenosis was measured utilizing the NASCET method. CONTRAST: 100 mL mL of Omnipaque 350 IV. COMPARISON: None FINDINGS: CTA brain: The distal vertebral arteries and basilar artery and cavernous portion of the internal carotid arteries are patent. The anterior cerebral arteries, middle cerebral arteries and posterior cerebral arteries are widely patent without large vessel occlusion, significant stenosis, aneurysm or AVM Dural venous sinuses are patent.. Patient has a known well marginated hyperdense ovoid mass along the left tentorium extending into the left occipital and posterior temporal lobe. Measuring 3.0 x 2.2 x 2.0 cm compatible with meningioma Aortic Arch and Great Vessel Origins: Three-vessel aortic arch Subclavian Arteries: There is intimal wall thickening with scattered calcified plaque at the origin of the left subclavian artery resulting in less than 50% stenosis. The right subclavian artery is widely patent. Right Common Carotid Artery: Widely patent. Right Internal Carotid Artery: Widely patent. Right External Carotid Artery: Widely patent. Left Common Carotid Artery: Widely patent. Left Internal Carotid Artery: Calcified plaque at the carotid bulb and proximal left internal carotid artery resulting in approximately 50-60% stenosis. The mid and distal left internal carotid  artery are widely patent Left External Carotid Artery: Widely patent. Right Vertebral Artery: Widely patent. Left Vertebral Artery: Widely patent. There are degenerative changes of the spine. The paraspinous soft tissues are normal. The lung apices are clear. IMPRESSION: Normal CTA of the brain Calcified plaque in the proximal left internal carotid artery resulting in a 56% hemodynamically significant stenosis No hemodynamically significant stenosis in the right internal carotid artery 3.0 x 2.2 x 2.0 cm well marginated hyperdense ovoid mass along the left tentorium extending into left occipital posterior temporal lobe compatible with meningioma This exam was performed according to our departmental dose-optimization program which includes automated exposure control, adjustment of the mA and/or kV according to patient size and/or use of iterative reconstruction technique. Electronically signed by:  Cynthia Piña MD  10/10/2023 04:43 PM CDT Workstation: [id number]    CT HEAD FOR STROKE    Result Date: 10/10/2023  CMS MANDATED QUALITY DATA - CT RADIATION  436 All CT scans at this facility utilize dose modulation, iterative reconstruction, and/or weight based dosing when appropriate to reduce radiation dose to as low as reasonably achievable. CT head without contrast Clinical data: Neuro deficit acute, history of meningioma COMPARISON: 7/29/2023 FINDINGS: The ventricles and sulci are prominent compatible with generalized cerebral atrophy. There is moderate periventricular low attenuation compatible with chronic small vessel disease. There is a stable 2.5 x 1.9 cm well marginated ovoid hyperdense mass along the left tentorium within the left temporal occipital region. There is no hemorrhage or midline shift. There is no surrounding vasogenic edema. The orbits are normal. Paranasal sinuses and mastoid air cells are clear. IMPRESSION: Age related volume loss with moderate chronic small vessel disease No acute intervening  process Stable 2.5 x 1.9 cm hyperdense meningioma along the left tentorium extending into the left temporal occipital lobes These findings were called to the ordering physician at 4:20 PM Electronically signed by:  Cynthia Piña MD  10/10/2023 04:21 PM CDT Workstation: [id number]        Assessment and Plan:    Transient Altered Mental Status - resolved  Seizure-like event  History of Left temporal meningioma  Stroke ruled out  Suspect Dementia with behavioral disturbance  89 yo woman with history of HTN and left temporal meningioma who presented to the ED with altered mental status characterized by confusion, disorientation and word finding difficulty that started the day of admission and is now resolved..  Family reported progressive memory loss, and they think she may have dementia. Neuro exam is non focal, but patient is disoriented and agitated at times. The event of transient alteration of awareness could have been a seizure, but patient refused to get EEG.  - Admitted to hospital medicine with q4 hour neuro checks, on telemetry, continuous pulse oximetry  - Diet after eval per SLP  - Secondary stroke prevention with ASA 81 mg daily, atorvastatin 40 mg daily  - Start memantine 5 mg at bedtime for memory  - Ordered MRI brain without contrast that redemonstrated the left temporal meningioma, unchanged in size from previous. No acute stroke was seen  - Pending encephalopathy labs including: TSH, RPR, CRP, ESR, vitamin B1, vitamin B12, vitamin B6  - Patient refused EEG as above  - Maintain Euthermia with Tylenol prn temp > 37.2 degrees C.  - Assessment for rehab with PT/OT/SLP evaluation and treatment.  - Patient should be evaluated by neuropsychiatry as outpatient to better assess for possible dementia  - workup and treatment of metabolic and infectious abnormalities as per primary team  - avoid anticholinergic medications or sedatives that could worsen AMS  - Will follow along    Delirium  recommendations:  -Attempt to facilitate normal sleep-wake cycle: Keep all lights on and window shades open during the day, patient can sit up in bed or ideally out of bed if appropriate, can keep TV on. At night, close window shades, turn off TV, turn lights off and minimize intrusions.  -Frequent redirection and reorientation as appropriate.  -Minimize use of antipsychotics, only use if nonpharmacologic measures are ineffective.  -Identify and treat potential causes such as medications and acute illness. Multiple causes are common.  -Optimize physiology, environment, and medications to promote brain recovery.  -Detect and treat agitation or distress with non-pharmacologic means, if possible.  -Communicate diagnosis to patients and caregivers and provide ongoing support.  -Attempt to prevent delirium complications such as immobility, falls, pressure sores, dehydration, malnourishment, and isolation.       DVT prophylaxis with chemo/SCD prophylaxis  Extensively discussed lifestyle modifications as prophylactic measures for stroke prevention including smoking cessation, adequate blood pressure management, healthy diet and regular exercise.    [phone] 3 days from discharge.     Stroke education was provided including stroke risk factors modification and any acute neurological changes including weakness, confusion, visual changes to come straight to the ER.     All questions were answered.                              Thank you kindly for including us in the care of this patient. Please do not hesitate to contact us with any questions.       Critical Care:  45 minutes of critical care time has been spent evaluating with the patient. Time includes chart review not limited to diagnostic imaging, labs, and vitals, patient assessment, discussion with family and nursing, current order evaluations, and new order entries.      Ysabel Traore MD  Neurology

## 2023-10-11 NOTE — DISCHARGE SUMMARY
[facility] Medicine  Discharge Summary      Patient Name: Shalini Donald  MRN: [id number]  ARI: [id number]  Patient Class: OP- Observation  Admission Date: 10/10/2023  Hospital Length of Stay: 0 days  Discharge Date and Time:  10/11/2023 6:17 PM  Attending Physician: No att. providers found   Discharging Provider: Florencia Valadez MD  Primary Care Provider: Kali Mccullough MD    Primary Care Team: Networked reference to record PCT     HPI:   Shalini Donald is a 89 y.o. White female   With PMH of HTN,   who presents with confusion.     Onset earlier this morning  Last known well not established  Pt was in bank when staff noted garbled speech  She had been there earlier with nl speech  History limited by AMS     Per family, pt has had progressive memory loss  However this is not her baseline today         * No surgery found *      Hospital Course:   Patient with history of hypertension, brought in due to speech disturbance with confusion.  As per HPI patient was in bank, speech was noted to be garbled with confusion and she was brought to the ED. CT head no hemorrhage, CTA head and neck with 56% left ICA stenosis.  Evaluated by vascular Neurology, NIHSS 3, out of window for tPA.  She was admitted for further evaluation of altered mental status, TIA, versus stroke, versus other.  MRI brain confirming 2.5 x 1.9 cm left meningioma, stable compared to previous.  No acute infarct on MRI, ruling out ischemic CVA.  Patient refused EEG and transthoracic echocardiogram.  Collateral from brother over the phone, patient with progressively worsening memory deficits, he states about 5 days ago he was called by neighbors due to patient wondering and had taken patient to live with him in [place].  He reports over the last 3 days some outpatient managed to take a taxi back to [place].  He states they have visited assisted living facility in the past but patient refused.  He states patient still drives.   Discussed with brother, diagnosis of dementia, need for 24 hour supervision for safety, states he plans on taking patient to live with him, made aware of safety concerns, no driving as well as safety at home. He home medications were restarted in antihypertensives.  Case management also provided resources to brother.  Discussed with Neurology, recommends starting memantine and titrating as able, outpatient psychiatry referral.  Discharge was reviewed with brother over the phone, expressed understanding, no questions or concerns.      Discharge examination  Lying in bed, able to state name, aware in hospital, on room air, moving all 4 extremities       Goals of Care Treatment Preferences:  Code Status: Full Code      Consults:   Consults (From admission, onward)        Status Ordering Provider     Inpatient consult to Neurology  Once        Provider:  Ysabel Maynard MD    Completed BISHOP LAGOS     IP consult to case management/social work  Once        Provider:  (Not yet assigned)    Completed BISHOP LAGOS     Consult to Telemedicine - Acute Stroke  Once        Provider:  (Not yet assigned)    Completed JOSUE FORD          No new Assessment & Plan notes have been filed under this hospital service since the last note was generated.  Service: Hospital Medicine    Final Active Diagnoses:    Diagnosis Date Noted POA    PRINCIPAL PROBLEM:  Dementia with behavioral disturbance [F03.918] 10/11/2023 Yes    Stenosis of left carotid artery, 56 % [I65.22] 10/11/2023 Yes     Chronic    Meningioma, 2.5 X 1.9 cm [D32.9] 10/11/2023 Yes     Chronic    Hypertension [I10] 11/21/2017 Yes      Problems Resolved During this Admission:    Diagnosis Date Noted Date Resolved POA    Hypokalemia [E87.6] 10/11/2023 10/11/2023 Yes    SADIA (acute kidney injury) [N17.9] 10/11/2023 10/11/2023 Yes       Discharged Condition: good    Disposition: Home-Health Care [facility]    Follow Up:   Follow-up Information     Jamel  Kali BROWN MD Follow up.    Specialty: Internal Medicine  Why: Per Dr. Mccullough's office, no appointments available - [facility] ordered for NP follow up at home. Scheduling to contact you with appointment date/time.     Outpatient Case Management ordered for additional needs after hospital discharge.  Contact information:  [address] [place] [address] [address]  [place] [address]  [phone]                       Patient Instructions:      Ambulatory referral/consult to [facility]   Standing Status: Future   Referral Priority: Routine Referral Type: Consultation   Referral Reason: Specialty Services Required   Number of Visits Requested: 1     Ambulatory referral/consult to Outpatient Case Management   Referral Priority: Routine Referral Type: Consultation   Referral Reason: Specialty Services Required   Number of Visits Requested: 1     Ambulatory referral/consult to Home Health   Standing Status: Future   Referral Priority: Routine Referral Type: Home Health   Referral Reason: Specialty Services Required   Requested Specialty: Home Health Services   Number of Visits Requested: 1     Ambulatory referral/consult to Psychiatry   Standing Status: Future   Referral Priority: Routine Referral Type: Psychiatric   Referral Reason: Specialty Services Required   Requested Specialty: Psychiatry   Number of Visits Requested: 1     Diet Adult Regular     Notify your health care provider if you experience any of the following:  increased confusion or weakness     Notify your health care provider if you experience any of the following:  difficulty breathing or increased cough     Notify your health care provider if you experience any of the following:  temperature >100.4     Activity as tolerated       Significant Diagnostic Studies: Labs:   BMP:   Recent Labs   Lab 10/10/23  1618 10/11/23  0454   * 91  91    139  139   K 3.4* 3.8  3.8    106  106   CO2 28 26  26   BUN 24* 14  14  "  CREATININE 1.0 0.6  0.6   CALCIUM 9.6 9.6  9.6   MG  --  1.8   , CMP   Recent Labs   Lab 10/10/23  1618 10/11/23  0454    139  139   K 3.4* 3.8  3.8    106  106   CO2 28 26  26   * 91  91   BUN 24* 14  14   CREATININE 1.0 0.6  0.6   CALCIUM 9.6 9.6  9.6   PROT 7.0 7.0   ALBUMIN 4.1 4.1   BILITOT 1.0 0.9   ALKPHOS 56 56   AST 24 24   ALT 21 21   ANIONGAP 7* 7*  7*   , CBC   Recent Labs   Lab 10/10/23  1618 10/11/23  0454   WBC 7.42 7.70   HGB 13.0 13.6   HCT 38.2 40.2    263   , INR   Lab Results   Component Value Date    INR 1.1 10/11/2023    INR 1.0 10/10/2023   , Lipid Panel   Lab Results   Component Value Date    CHOL 162 10/10/2023    HDL 72 10/10/2023    LDLCALC 67.8 10/10/2023    TRIG 111 10/10/2023    CHOLHDL 44.4 10/10/2023   , Troponin No results for input(s): "TROPONINI" in the last 168 hours. and A1C:   Recent Labs   Lab 10/10/23  2154   HGBA1C 5.7       Pending Diagnostic Studies:     Procedure Component Value Units Date/Time    Echo Saline Bubble? Yes [[id number]]     Order Status: Sent Lab Status: No result        MRI Brain Without Contrast    Result Date: 10/11/2023  Examination: MRI of the brain without contrast CLINICAL HISTORY: CVA. Confusion. COMPARISON: CT scan of the head without contrast 10/10/2023 TECHNIQUE: Multisequence multiplanar MR examination of the brain was employed utilizing a high-field system with the series T1, T2 gradient echo, and T2 FLAIR weighted sequences. FINDINGS: Lack of any significant signal alteration based diffusion-weighted images are discussed the presence of an acute ischemic event. There is no evidence of outstanding intracranial signal intensity changes suggestive of intraparenchymal hemorrhage or mass lesion or acute vascular insult. Multiple T2 bright signal intensity foci are established secondary to chronic microvascular changes are small chronic microinfarcts. There is a fairly well-defined elliptical low T1 signal " intensity mass marginating the left tentorial interface measures 3.2 x 2.6 cm the sagittal plane with AP dimension of 3.0 cm maintaining equal parameters upon comparison. Mass lies parallel to the tentorial interface suggestive of a benign meningioma. No evidence of complex signal.. There is no evidence of intracranial hemorrhage or edema. The antegrade flow is overall established. The cord maintains normal signal intensity. The upper cervical spine appears normal. IMPRESSION: 3.2 x 26 x 3.0 cm mass along the left tentorial interface that shows equal parameters upon comparison patient's most recent CT examination factors that are suggestive of benign meningioma formation. Electronically signed by:  Irwin Vela MD  10/11/2023 09:13 AM CDT Workstation: [id number]    X-Ray Chest 1 View    Result Date: 10/11/2023  Chest single view Clinical data:Altered mental status. FINDINGS: AP view of the chest demonstrates no cardiac, pulmonary, or acute osseous abnormalities. IMPRESSION: 1. No acute process. Electronically signed by:  Karlos Mccormick MD  10/11/2023 07:14 AM CDT Workstation: [id number]    CTA Head and Neck (xpd)    Result Date: 10/10/2023  EXAMINATION: CTA HEAD AND NECK (XPD) CLINICAL INDICATION: Female, 89 years old. Neuro deficit, acute, stroke suspected TECHNIQUE: Axial CT angiogram of the head and neck was performed with contrast. Multiplanar reformations as well as 3-D volume rendered imaging were reviewed at the workstation. Degree of stenosis was measured utilizing the NASCET method. CONTRAST: 100 mL mL of Omnipaque 350 IV. COMPARISON: None FINDINGS: CTA brain: The distal vertebral arteries and basilar artery and cavernous portion of the internal carotid arteries are patent. The anterior cerebral arteries, middle cerebral arteries and posterior cerebral arteries are widely patent without large vessel occlusion, significant stenosis, aneurysm or AVM Dural venous sinuses are patent.. Patient has a known  well marginated hyperdense ovoid mass along the left tentorium extending into the left occipital and posterior temporal lobe. Measuring 3.0 x 2.2 x 2.0 cm compatible with meningioma Aortic Arch and Great Vessel Origins: Three-vessel aortic arch Subclavian Arteries: There is intimal wall thickening with scattered calcified plaque at the origin of the left subclavian artery resulting in less than 50% stenosis. The right subclavian artery is widely patent. Right Common Carotid Artery: Widely patent. Right Internal Carotid Artery: Widely patent. Right External Carotid Artery: Widely patent. Left Common Carotid Artery: Widely patent. Left Internal Carotid Artery: Calcified plaque at the carotid bulb and proximal left internal carotid artery resulting in approximately 50-60% stenosis. The mid and distal left internal carotid artery are widely patent Left External Carotid Artery: Widely patent. Right Vertebral Artery: Widely patent. Left Vertebral Artery: Widely patent. There are degenerative changes of the spine. The paraspinous soft tissues are normal. The lung apices are clear. IMPRESSION: Normal CTA of the brain Calcified plaque in the proximal left internal carotid artery resulting in a 56% hemodynamically significant stenosis No hemodynamically significant stenosis in the right internal carotid artery 3.0 x 2.2 x 2.0 cm well marginated hyperdense ovoid mass along the left tentorium extending into left occipital posterior temporal lobe compatible with meningioma This exam was performed according to our departmental dose-optimization program which includes automated exposure control, adjustment of the mA and/or kV according to patient size and/or use of iterative reconstruction technique. Electronically signed by:  Cynthia Piña MD  10/10/2023 04:43 PM CDT Workstation: [id number]    CT HEAD FOR STROKE    Result Date: 10/10/2023  CMS MANDATED QUALITY DATA - CT RADIATION  436 All CT scans at this facility utilize dose  modulation, iterative reconstruction, and/or weight based dosing when appropriate to reduce radiation dose to as low as reasonably achievable. CT head without contrast Clinical data: Neuro deficit acute, history of meningioma COMPARISON: 7/29/2023 FINDINGS: The ventricles and sulci are prominent compatible with generalized cerebral atrophy. There is moderate periventricular low attenuation compatible with chronic small vessel disease. There is a stable 2.5 x 1.9 cm well marginated ovoid hyperdense mass along the left tentorium within the left temporal occipital region. There is no hemorrhage or midline shift. There is no surrounding vasogenic edema. The orbits are normal. Paranasal sinuses and mastoid air cells are clear. IMPRESSION: Age related volume loss with moderate chronic small vessel disease No acute intervening process Stable 2.5 x 1.9 cm hyperdense meningioma along the left tentorium extending into the left temporal occipital lobes These findings were called to the ordering physician at 4:20 PM Electronically signed by:  Cynthia Piña MD  10/10/2023 04:21 PM CDT Workstation: [id number]    Medications:  Reconciled Home Medications:      Medication List      START taking these medications    aspirin 81 MG EC tablet  Commonly known as: ECOTRIN  Take 1 tablet (81 mg total) by mouth once daily.  Start taking on: October 12, 2023  Replaces: THERESE ASPIRIN 325 MG tablet     atorvastatin 20 MG tablet  Commonly known as: LIPITOR  Take 1 tablet (20 mg total) by mouth every evening.     memantine 5 MG Tab  Commonly known as: NAMENDA  Take 1 tablet (5 mg total) by mouth every evening.        CONTINUE taking these medications    valsartan 160 MG tablet  Commonly known as: DIOVAN  Take 1 tablet (160 mg total) by mouth once daily.        STOP taking these medications    THERESE ASPIRIN 325 MG tablet  Generic drug: aspirin  Replaced by: aspirin 81 MG EC tablet     cloNIDine 0.1 MG tablet  Commonly known as: CATAPRES      triamcinolone acetonide 0.1% 0.1 % cream  Commonly known as: KENALOG            Indwelling Lines/Drains at time of discharge:   Lines/Drains/Airways     None                 Time spent on the discharge of patient: 35 minutes         Florencia Valadez MD  Department of Hospital Medicine  [facility]

## 2023-10-11 NOTE — ED NOTES
PT CLIMBS OVER SR AND CLOTHES SELF. ATTEMPTS TO LEAVE.  CANNOT REMEMBER ADDRESS REMAINS CONFUSED. UNSTEADY, REORIENTED AND GENTLY LED TO BED. TOILET ING PROVIDED. VISUAL OBSERVATION. FALLS PRECAUTIONS CONTINUE.

## 2023-10-12 ENCOUNTER — PES CALL (OUTPATIENT)
Dept: HOME HEALTH SERVICES | Facility: CLINIC | Age: 88
End: 2023-10-12
Payer: MEDICARE

## 2023-10-12 NOTE — PLAN OF CARE
Notified by Toñito Hernandez that patient's POA denied HH on admission    Disposition updated accordingly       10/12/23 2633   Final Note   Assessment Type Final Discharge Note   Anticipated Discharge Disposition Home

## 2023-10-13 ENCOUNTER — PES CALL (OUTPATIENT)
Dept: HOME HEALTH SERVICES | Facility: CLINIC | Age: 88
End: 2023-10-13
Payer: MEDICARE

## 2023-10-16 ENCOUNTER — PES CALL (OUTPATIENT)
Dept: HOME HEALTH SERVICES | Facility: CLINIC | Age: 88
End: 2023-10-16
Payer: MEDICARE

## 2023-11-29 ENCOUNTER — HOSPITAL ENCOUNTER (EMERGENCY)
Facility: HOSPITAL | Age: 88
Discharge: PSYCHIATRIC OR SUBSTANCE USE FACILITY | End: 2023-11-29
Attending: EMERGENCY MEDICINE
Payer: MEDICARE

## 2023-11-29 VITALS
RESPIRATION RATE: 18 BRPM | OXYGEN SATURATION: 100 % | DIASTOLIC BLOOD PRESSURE: 76 MMHG | TEMPERATURE: 99 F | HEART RATE: 76 BPM | SYSTOLIC BLOOD PRESSURE: 164 MMHG | WEIGHT: 100 LBS | HEIGHT: 61 IN | BODY MASS INDEX: 18.88 KG/M2

## 2023-11-29 DIAGNOSIS — F03.918 DEMENTIA WITH BEHAVIORAL DISTURBANCE: Primary | ICD-10-CM

## 2023-11-29 LAB
ALBUMIN SERPL BCP-MCNC: 4.8 G/DL (ref 3.5–5.2)
ALP SERPL-CCNC: 79 U/L (ref 55–135)
ALT SERPL W/O P-5'-P-CCNC: 18 U/L (ref 10–44)
AMPHET+METHAMPHET UR QL: NEGATIVE
ANION GAP SERPL CALC-SCNC: 6 MMOL/L (ref 8–16)
APAP SERPL-MCNC: 0.4 UG/ML (ref 10–20)
AST SERPL-CCNC: 21 U/L (ref 10–40)
BARBITURATES UR QL SCN>200 NG/ML: NEGATIVE
BASOPHILS # BLD AUTO: 0.05 K/UL (ref 0–0.2)
BASOPHILS NFR BLD: 0.6 % (ref 0–1.9)
BENZODIAZ UR QL SCN>200 NG/ML: NEGATIVE
BILIRUB SERPL-MCNC: 0.7 MG/DL (ref 0.1–1)
BILIRUB UR QL STRIP: NEGATIVE
BUN SERPL-MCNC: 22 MG/DL (ref 8–23)
BZE UR QL SCN: NEGATIVE
CALCIUM SERPL-MCNC: 10.6 MG/DL (ref 8.7–10.5)
CANNABINOIDS UR QL SCN: NEGATIVE
CHLORIDE SERPL-SCNC: 99 MMOL/L (ref 95–110)
CLARITY UR: CLEAR
CO2 SERPL-SCNC: 31 MMOL/L (ref 23–29)
COLOR UR: YELLOW
CREAT SERPL-MCNC: 0.7 MG/DL (ref 0.5–1.4)
CREAT UR-MCNC: 41.6 MG/DL (ref 15–325)
DIFFERENTIAL METHOD: ABNORMAL
EOSINOPHIL # BLD AUTO: 0.8 K/UL (ref 0–0.5)
EOSINOPHIL NFR BLD: 9.4 % (ref 0–8)
ERYTHROCYTE [DISTWIDTH] IN BLOOD BY AUTOMATED COUNT: 12.6 % (ref 11.5–14.5)
EST. GFR  (NO RACE VARIABLE): >60 ML/MIN/1.73 M^2
ETHANOL SERPL-MCNC: <10 MG/DL
GLUCOSE SERPL-MCNC: 98 MG/DL (ref 70–110)
GLUCOSE UR QL STRIP: NEGATIVE
HCT VFR BLD AUTO: 42.6 % (ref 37–48.5)
HGB BLD-MCNC: 14.3 G/DL (ref 12–16)
HGB UR QL STRIP: NEGATIVE
IMM GRANULOCYTES # BLD AUTO: 0.02 K/UL (ref 0–0.04)
IMM GRANULOCYTES NFR BLD AUTO: 0.2 % (ref 0–0.5)
KETONES UR QL STRIP: NEGATIVE
LEUKOCYTE ESTERASE UR QL STRIP: NEGATIVE
LYMPHOCYTES # BLD AUTO: 2 K/UL (ref 1–4.8)
LYMPHOCYTES NFR BLD: 25.1 % (ref 18–48)
MCH RBC QN AUTO: 32.5 PG (ref 27–31)
MCHC RBC AUTO-ENTMCNC: 33.6 G/DL (ref 32–36)
MCV RBC AUTO: 97 FL (ref 82–98)
MONOCYTES # BLD AUTO: 0.6 K/UL (ref 0.3–1)
MONOCYTES NFR BLD: 7.9 % (ref 4–15)
NEUTROPHILS # BLD AUTO: 4.6 K/UL (ref 1.8–7.7)
NEUTROPHILS NFR BLD: 56.8 % (ref 38–73)
NITRITE UR QL STRIP: NEGATIVE
NRBC BLD-RTO: 0 /100 WBC
OPIATES UR QL SCN: NEGATIVE
PCP UR QL SCN>25 NG/ML: NEGATIVE
PH UR STRIP: 6 [PH] (ref 5–8)
PLATELET # BLD AUTO: 302 K/UL (ref 150–450)
PMV BLD AUTO: 9.4 FL (ref 9.2–12.9)
POTASSIUM SERPL-SCNC: 3.8 MMOL/L (ref 3.5–5.1)
PROT SERPL-MCNC: 7.9 G/DL (ref 6–8.4)
PROT UR QL STRIP: NEGATIVE
RBC # BLD AUTO: 4.4 M/UL (ref 4–5.4)
SODIUM SERPL-SCNC: 136 MMOL/L (ref 136–145)
SP GR UR STRIP: 1.01 (ref 1–1.03)
TOXICOLOGY INFORMATION: NORMAL
TSH SERPL DL<=0.005 MIU/L-ACNC: 1.64 UIU/ML (ref 0.34–5.6)
URN SPEC COLLECT METH UR: NORMAL
UROBILINOGEN UR STRIP-ACNC: NEGATIVE EU/DL
WBC # BLD AUTO: 8.06 K/UL (ref 3.9–12.7)

## 2023-11-29 PROCEDURE — 25000003 PHARM REV CODE 250: Performed by: EMERGENCY MEDICINE

## 2023-11-29 PROCEDURE — 80307 DRUG TEST PRSMV CHEM ANLYZR: CPT | Performed by: EMERGENCY MEDICINE

## 2023-11-29 PROCEDURE — 84443 ASSAY THYROID STIM HORMONE: CPT | Performed by: EMERGENCY MEDICINE

## 2023-11-29 PROCEDURE — G0427 INPT/ED TELECONSULT70: HCPCS | Mod: 95,,, | Performed by: PSYCHIATRY & NEUROLOGY

## 2023-11-29 PROCEDURE — 82077 ASSAY SPEC XCP UR&BREATH IA: CPT | Performed by: EMERGENCY MEDICINE

## 2023-11-29 PROCEDURE — 80053 COMPREHEN METABOLIC PANEL: CPT | Performed by: EMERGENCY MEDICINE

## 2023-11-29 PROCEDURE — 99285 EMERGENCY DEPT VISIT HI MDM: CPT | Mod: 25

## 2023-11-29 PROCEDURE — 80143 DRUG ASSAY ACETAMINOPHEN: CPT | Performed by: EMERGENCY MEDICINE

## 2023-11-29 PROCEDURE — 81003 URINALYSIS AUTO W/O SCOPE: CPT | Mod: 59 | Performed by: EMERGENCY MEDICINE

## 2023-11-29 PROCEDURE — G0427 PR INPT TELEHEALTH CON 70/>M: ICD-10-PCS | Mod: 95,,, | Performed by: PSYCHIATRY & NEUROLOGY

## 2023-11-29 PROCEDURE — 85025 COMPLETE CBC W/AUTO DIFF WBC: CPT | Performed by: EMERGENCY MEDICINE

## 2023-11-29 RX ORDER — VALSARTAN 80 MG/1
160 TABLET ORAL
Status: COMPLETED | OUTPATIENT
Start: 2023-11-29 | End: 2023-11-29

## 2023-11-29 RX ORDER — OLANZAPINE 5 MG/1
10 TABLET, ORALLY DISINTEGRATING ORAL ONCE
Status: COMPLETED | OUTPATIENT
Start: 2023-11-29 | End: 2023-11-29

## 2023-11-29 RX ADMIN — VALSARTAN 160 MG: 80 TABLET, FILM COATED ORAL at 07:11

## 2023-11-29 RX ADMIN — OLANZAPINE 10 MG: 5 TABLET, ORALLY DISINTEGRATING ORAL at 07:11

## 2023-11-29 NOTE — CONSULTS
"  The patient location is  [facility] EMERGENCY DEPARTMENT     Consults  Consult Start Time: 11/29/2023 15:30 CST  Consult End Time: 11/29/2023 16:30 CST          Tele-Consultation to Emergency Department from Psychiatry    Patient agreeable to consultation via telepsychiatry.    Start time of consultation: 3:30 pm     The chief complaint leading to psychiatric consultation is: reported confusion  This consultation is from the Emergency Department attending physician Dr. Serafin Garza.   The location of the consulting psychiatrist is [address] [place] [address].     Patient Identification:  Shalini Donald is a 89 y.o. female.    Patient information was obtained from patient.    History of Present Illness:    From current presentation:  "  Patient presents with    Psychiatric Evaluation       SO reports pt having delusions. Believes she was robbed and went to [employer] to call 911. Brother was contacted by SO and was told that she has undiagnosed dementia and possible undiagnosed psychiatric issues.    This is an 89-year-old female brought in by police for evaluation of confusion.  Police state that she called them because she thought someone had broken into her house because she could not find certain things and thought someone was in her house.  Police states when talking to her she was obviously confused.  He found smoking, black charred potatoes in an oven that was turned on, and the patient did not remember turning on the oven.  The patient lives alone.  He forced her to come to the ED for evaluation as he was concerned about her safety home alone.  Patient is calm and denies any complaints.  She does not remember calling the police, says that she thinks her nosey neighbors called them to check on her.  She thinks it is 1922.  Chart review shows that she was admitted here last month for an episode of confusion and had negative stroke workup.  It also shows that she was brought to the ED by her brother 4 " "months ago for evaluation of worsening confusion over the last year.  It sounds like the patient has undiagnosed dementia."    On interview by me today:  Pt. Agreeable to me calling brother.  States the month is September, does not know the year, knows where she is.  Denies SI/HI.  Denies alcohol/drug use.  States that she lives alone.  No pets.  Does not think that she is having memory problems.  Drives.    Son Moses Donald [phone]    Brother Moses [phone], [phone]: pt. Has dementia; lives alone, no pet; brother Moses has power of ; memory problems began about a year ago, no previous history of psychiatric problem; no alcohol/drug; says she wishes she would die; hides things and cannot find them;    Current Outpatient Medications on File Prior to Encounter   Medication Sig Dispense Refill Last Dose    aspirin (ECOTRIN) 81 MG EC tablet Take 1 tablet (81 mg total) by mouth once daily. 30 tablet 0     atorvastatin (LIPITOR) 20 MG tablet Take 1 tablet (20 mg total) by mouth every evening. 30 tablet 0     memantine (NAMENDA) 5 MG Tab Take 1 tablet (5 mg total) by mouth every evening. 30 tablet 0     valsartan (DIOVAN) 160 MG tablet Take 1 tablet (160 mg total) by mouth once daily. 30 tablet 0         Psychiatric History:   Denies ever seeing a psychiatrist.  Hospitalization: denies    Review of Systems:  Denies any current physical complaint.    Past Medical History:   Past Medical History:   Diagnosis Date    Arthritis     Back pain     Hypertension     Wears glasses     Wears partial dentures     upper      Allergies:   Review of patient's allergies indicates:   Allergen Reactions    Pcn [penicillins] Rash    Tylenol [acetaminophen] Other (See Comments)     headaches       Medications in ER:   Medications   OLANZapine zydis disintegrating tablet 10 mg (has no administration in time range)     Social History:   Access to Gun: denies    Current Evaluation:     Constitutional  Vitals:  Vitals:    " "11/29/23 1352   BP: (!) 182/87   Pulse: 75   Resp: 16   Temp: 97.7 °F (36.5 °C)   TempSrc: Oral   SpO2: 96%   Weight: 45.4 kg (100 lb)   Height: 5' 1" (1.549 m)      General:  unremarkable, age appropriate     Musculoskeletal  Muscle Strength/Tone:   moving arms normally   Gait & Station:   sitting on stretcher     Psychiatric  Level of Consciousness: alert  Orientation: States the month is September, does not know the year, knows where she is.  Psychomotor Behavior: no agitation  Speech: normal in rate, rhythm and volume  Language: uses words appropriately  Mood: steady  Affect: appropriate  Thought Process: goal directed  Associations: intact  Thought Content: denies SI/HI  Attention: intact to interview  Insight: appears limited  Judgement: appears limited    Relevant Elements of Neurological Exam: no abnormality of posture noted    Assessment - Diagnosis - Goals:     Diagnosis/Impression:   Cognitive d/o, unspecified  Reported passive SI  Reported disorganized behavior    Based on currently available information pt. Appears gravely disabled.    EKG from 10/10/23 showed QTc of 476    Rec:   - medical clearance  - PEC and psychiatric hospitalization  - no standing psychotropic medication for now  - Haldol 2.5 mg PO/IM Q8H PRN for agitation  - follow EKG/QTc if pt. Receives Haldol    Total time, including chart review, interview of the patient, obtaining collateral info[if possible]: 60 min    Laboratory Data:   Labs Reviewed   CBC W/ AUTO DIFFERENTIAL - Abnormal; Notable for the following components:       Result Value    MCH 32.5 (*)     Eos # 0.8 (*)     Eosinophil % 9.4 (*)     All other components within normal limits   URINALYSIS, REFLEX TO URINE CULTURE   ALCOHOL,MEDICAL (ETHANOL)   ACETAMINOPHEN LEVEL   COMPREHENSIVE METABOLIC PANEL   TSH   DRUG SCREEN PANEL, URINE EMERGENCY        "

## 2023-11-29 NOTE — ED NOTES
Rounded on pt, pt updated on the POC. Comfort, positioning, and restroom needs addressed. The pt is resting queitly in bed, eyes open spontaneously, chest rising and falling, respirations are even and unlabored, NADN. Bed in lowest and locked position, SR ^ X 2, call light in reach. Sitter maintaining visual contact. Pt encouraged to call nurses station for any needs. Pt verbalizes understanding.
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
## 2023-11-30 NOTE — ED NOTES
Patient resting in bed, respirations even and unlabored. No distress noted on exam. Plan of care ongoing. No further concerns as of present.

## (undated) DEVICE — SEE MEDLINE ITEM 152530

## (undated) DEVICE — GAUZE SPONGE BULKEE 6X6.75IN

## (undated) DEVICE — PAD CAST SPECIALIST STRL 6

## (undated) DEVICE — SEE MEDLINE ITEM 146271

## (undated) DEVICE — SHEET DRAPE MEDIUM

## (undated) DEVICE — ALCOHOL 70% ISOP RUBBING 4OZ

## (undated) DEVICE — KIT AUTO TRANSF 800ML RESVR

## (undated) DEVICE — TOURNIQUET SB QC DP 34X4IN

## (undated) DEVICE — TRAY DRY SPONGE SCRUB W FOAM

## (undated) DEVICE — PACK BASIC

## (undated) DEVICE — ELECTRODE REM PLYHSV RETURN 9

## (undated) DEVICE — DRAPE INCISE IOBAN 2 23X17IN

## (undated) DEVICE — COVER SURG LIGHT HANDLE

## (undated) DEVICE — SEE MEDLINE ITEM 146231

## (undated) DEVICE — SUT VCRL 2-0 GYN 8-18IN MO4

## (undated) DEVICE — PAD ABD 8X10 STERILE

## (undated) DEVICE — INTERPULSE SET

## (undated) DEVICE — CLOSURE SKIN STERI STRIP 1/2X4

## (undated) DEVICE — SEE MEDLINE ITEM 153151

## (undated) DEVICE — HOOK CEMENT BECHTOL**CALL IN**

## (undated) DEVICE — PACK CUSTOM UNIV BASIN SLI

## (undated) DEVICE — LINER SUCTION 3000CC

## (undated) DEVICE — COLD THER SYS W/PAD UNV RH

## (undated) DEVICE — UNDERGLOVES BIOGEL PI SIZE 8

## (undated) DEVICE — MIXER BONE CEMENT

## (undated) DEVICE — SYR 30CC LUER LOCK

## (undated) DEVICE — GLOVE SURG ULTRA TOUCH 8

## (undated) DEVICE — BANDAGE ESMARK 6X12

## (undated) DEVICE — PADDING CAST SPECIALIST 6X4YD

## (undated) DEVICE — SUT VICRYL 1 MO-4 18 CR/8

## (undated) DEVICE — BLADE ELECTRO EXTENDED.

## (undated) DEVICE — GOWN X-LG STERILE BACK

## (undated) DEVICE — NDL ECLIPSE SAFETY 18GX1-1/2IN

## (undated) DEVICE — SEE MEDLINE ITEM 157131

## (undated) DEVICE — TAPE SILK 3IN

## (undated) DEVICE — SOL IRR NACL .9% 3000ML

## (undated) DEVICE — SCRUB HIBICLENS 4% CHG 4OZ

## (undated) DEVICE — GLOVE SURG ULTRA TOUCH 7.5

## (undated) DEVICE — DRESSING N ADH OIL EMUL 3X8 3S

## (undated) DEVICE — BRUSH SCRUB HIBICLENS 4%

## (undated) DEVICE — SEE MEDLINE ITEM 157166

## (undated) DEVICE — SEE MEDLINE ITEM 152622

## (undated) DEVICE — SOL 9P NACL IRR PIC IL

## (undated) DEVICE — SUT #2 TI-CRON HGS-21 30IN

## (undated) DEVICE — PACK ARTHROSCOPY W/ISO BAC

## (undated) DEVICE — DRAIN SINGLE ROUND 3/16 15F

## (undated) DEVICE — SUT MONOCRYL 3-0 PS-1

## (undated) DEVICE — BLADE SAG DUAL 18MMX1.27MMX90M